# Patient Record
Sex: MALE | Race: WHITE | NOT HISPANIC OR LATINO | Employment: OTHER | URBAN - METROPOLITAN AREA
[De-identification: names, ages, dates, MRNs, and addresses within clinical notes are randomized per-mention and may not be internally consistent; named-entity substitution may affect disease eponyms.]

---

## 2017-01-18 ENCOUNTER — TELEPHONE (OUTPATIENT)
Dept: ORTHOPEDIC SURGERY | Facility: CLINIC | Age: 62
End: 2017-01-18

## 2017-01-18 NOTE — TELEPHONE ENCOUNTER
I spoke with patient and I have reviewed his case with other foot and ankle colleagues.  He like to have the most definitive treatment possible and I feel like he has been mother colleagues that the more predictable outcome would be with first MTP fusion and second metatarsal osteotomy or partial resection and work there.  Patient is in agreement with that.  When have him come in the office tomorrow let me take a look at everything again and hopefully get him taken care of next week

## 2017-01-19 ENCOUNTER — OFFICE VISIT (OUTPATIENT)
Dept: ORTHOPEDIC SURGERY | Facility: CLINIC | Age: 62
End: 2017-01-19

## 2017-01-19 VITALS — WEIGHT: 200 LBS | HEIGHT: 72 IN | BODY MASS INDEX: 27.09 KG/M2

## 2017-01-19 DIAGNOSIS — M20.11 HALLUX VALGUS, RIGHT: Primary | ICD-10-CM

## 2017-01-19 DIAGNOSIS — M77.41 METATARSALGIA OF RIGHT FOOT: ICD-10-CM

## 2017-01-19 DIAGNOSIS — M20.41 HAMMER TOE OF RIGHT FOOT: ICD-10-CM

## 2017-01-19 PROCEDURE — 99024 POSTOP FOLLOW-UP VISIT: CPT | Performed by: ORTHOPAEDIC SURGERY

## 2017-01-19 RX ORDER — SODIUM CHLORIDE 0.9 % (FLUSH) 0.9 %
1-10 SYRINGE (ML) INJECTION AS NEEDED
Status: CANCELLED | OUTPATIENT
Start: 2017-01-19

## 2017-01-19 RX ORDER — CEFAZOLIN SODIUM 2 G/100ML
2 INJECTION, SOLUTION INTRAVENOUS ONCE
Status: CANCELLED | OUTPATIENT
Start: 2017-01-19 | End: 2017-01-19

## 2017-01-19 NOTE — PROGRESS NOTES
"Foot Follow Up      Patient: Tesfaye Smith    YOB: 1955 61 y.o. male    Chief Complaints: Foot hurts    History of Present Illness: Patient is seen back today with a long history of persistent complaints of pain beneath the  Right second metatarsal head but no ulcers and associated cockup deformity of the second toe with recurrent significant hallux valgus deformity.  He now does get some occasional discomfort under the medial aspect of the great toe and tries to avoid weightbearing on that side.  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  Significant hallux valgus deformity of the right great toe is not quite passively correctable to neutral.  There is severe cockup deformity with clinical dislocation of the second MTP joint that is not reducible there is rigid hammering of the PIP joint second metatarsal head is prominent and there is some slight callus but no ulceration.  He is nontender many the third metatarsal head.  Grossly sensate to light touch throughout the foot.  His medial skin is now markedly improved there is no callus or fissuring.  He is nontender over the first TMT joint and there is no instability      Radiology: None performed      Assessment/Plan:  right recurrent hallux valgus deformity with deficient medial capsule and some mild arthritic pain to the first MTP joint.  right second MTP dislocation with metatarsalgia and hammering.  I've had a long discussion with him today and reviewed his case with other foot and ankle colleagues.  He would like to have the most definitive treatment possible so " as to avoid further surgery or potential other complications.  I told him that although no guarantee could be given regarding lack of complications that I feel like the most definitive procedure for him would be a first MTP arthrodesis this will get his first toe out of the way and relief arthritic pain possibly.  We'll also need to do extensor tendon lengthening and capsular release of the second MTP joint with possible partial metatarsal head resection or shortening osteotomy as well as PIP joint resection which I described to him in detail.  He voiced clear understanding of all this and understands and wishes to proceed.  Risks benefits potential outcomes and complications can include but are not limited to heart attack stroke death pneumonia infection bleeding damage to blood vessels and nerves or tendons blood clot pulmonary embolism persistent or worsening pain malunion or nonunion, transfer metatarsalgia and loss of the toes, and failure to return to presurgery and precondition levels of activity.  All of his questions answered to his full satisfaction will plan to proceed with this next week on an outpatient basis.

## 2017-01-19 NOTE — MR AVS SNAPSHOT
Tesfaye Smith   1/19/2017 3:15 PM   Office Visit    Dept Phone:  [phone]   Encounter #:  [id number]    Provider:  Jagdeep Medrano MD   Department:  [facility]                Your Full Care Plan              Your Updated Medication List          This list is accurate as of: 1/19/17  4:08 PM.  Always use your most recent med list.                ASPIR 81 MG EC tablet   Generic drug:  aspirin       CENTRUM SILVER ADULT 50+ tablet       lisinopril 10 MG tablet 20 mg, hydrochlorothiazide 25 MG tablet 25 mg       meloxicam 7.5 MG tablet   Commonly known as:  MOBIC       NEXIUM 40 MG capsule   Generic drug:  esomeprazole       ZYRTEC ALLERGY 10 MG capsule   Generic drug:  Cetirizine HCl               You Were Diagnosed With        Codes Comments    Hallux valgus, right    -  Primary ICD-10-CM: M20.11  ICD-9-CM: 735.0       Instructions     None    Patient Instructions History      Upcoming Appointments     Visit Type Date Time Department    FOLLOW UP 1/19/2017  3:15 PM [facility]    [facility] 2/1/2017  9:30 AM [facility]    OFFICE VISIT 2/8/2017 10:30 AM [facility]      [employer] Signup     Our records indicate that you have an active [employer] [facility] [employer] account.    You can view your After Visit Summary by going to [employer] and logging in with your CardSpring username and password.  If you don't have a CardSpring username and password but a parent or guardian has access to your record, the parent or guardian should login with their own CardSpring username and password and access your record to view the After Visit Summary.    If you have questions, you can email IS [email or web address] or call [phone] to talk to our CardSpring staff.  Remember, CardSpring is NOT to be used for urgent needs.  For medical emergencies, dial 911.               Other Info from Your Visit           Your Appointments     Feb  "01, 2017  9:30 AM EST   [facility] with [facility] MARCELLUSDESIREE AMAROU   [facility] (--)    [address] [id number] [phone]            Feb 08, 2017 10:30 AM EST   Office Visit with Adelaida Joseph MD   [facility] (--)    [address] [address] [phone]           Arrive 15 minutes prior to appointment.              Allergies     No Known Allergies      Reason for Visit     Right Foot - Follow-up, Pain           Vital Signs     Height Weight Body Mass Index Smoking Status          72\" (182.9 cm) 200 lb (90.7 kg) 27.12 kg/m2 Never Smoker        Problems and Diagnoses Noted     Bunion        "

## 2017-01-20 ENCOUNTER — APPOINTMENT (OUTPATIENT)
Dept: PREADMISSION TESTING | Facility: HOSPITAL | Age: 62
End: 2017-01-20

## 2017-01-20 VITALS
BODY MASS INDEX: 27.09 KG/M2 | DIASTOLIC BLOOD PRESSURE: 92 MMHG | HEIGHT: 72 IN | RESPIRATION RATE: 18 BRPM | TEMPERATURE: 98.3 F | WEIGHT: 200 LBS | SYSTOLIC BLOOD PRESSURE: 136 MMHG | OXYGEN SATURATION: 99 % | HEART RATE: 73 BPM

## 2017-01-20 LAB
ANION GAP SERPL CALCULATED.3IONS-SCNC: 15.3 MMOL/L
BUN BLD-MCNC: 15 MG/DL (ref 8–23)
BUN/CREAT SERPL: 13.4 (ref 7–25)
CALCIUM SPEC-SCNC: 9.5 MG/DL (ref 8.6–10.5)
CHLORIDE SERPL-SCNC: 100 MMOL/L (ref 98–107)
CO2 SERPL-SCNC: 26.7 MMOL/L (ref 22–29)
CREAT BLD-MCNC: 1.12 MG/DL (ref 0.76–1.27)
DEPRECATED RDW RBC AUTO: 47.4 FL (ref 37–54)
ERYTHROCYTE [DISTWIDTH] IN BLOOD BY AUTOMATED COUNT: 14 % (ref 11.5–14.5)
GFR SERPL CREATININE-BSD FRML MDRD: 66 ML/MIN/1.73
GLUCOSE BLD-MCNC: 99 MG/DL (ref 65–99)
HCT VFR BLD AUTO: 44.8 % (ref 40.4–52.2)
HGB BLD-MCNC: 15 G/DL (ref 13.7–17.6)
MCH RBC QN AUTO: 31.2 PG (ref 27–32.7)
MCHC RBC AUTO-ENTMCNC: 33.5 G/DL (ref 32.6–36.4)
MCV RBC AUTO: 93.1 FL (ref 79.8–96.2)
PLATELET # BLD AUTO: 193 10*3/MM3 (ref 140–500)
PMV BLD AUTO: 11.5 FL (ref 6–12)
POTASSIUM BLD-SCNC: 3.7 MMOL/L (ref 3.5–5.2)
RBC # BLD AUTO: 4.81 10*6/MM3 (ref 4.6–6)
SODIUM BLD-SCNC: 142 MMOL/L (ref 136–145)
WBC NRBC COR # BLD: 8.68 10*3/MM3 (ref 4.5–10.7)

## 2017-01-20 PROCEDURE — 85027 COMPLETE CBC AUTOMATED: CPT | Performed by: ORTHOPAEDIC SURGERY

## 2017-01-20 PROCEDURE — 80048 BASIC METABOLIC PNL TOTAL CA: CPT | Performed by: ORTHOPAEDIC SURGERY

## 2017-01-20 PROCEDURE — 36415 COLL VENOUS BLD VENIPUNCTURE: CPT

## 2017-01-20 NOTE — MR AVS SNAPSHOT
Tesfaye Smith   1/20/2017 3:00 PM   Appointment    Provider:  MIHAI FORTUNE 1   Department:  [facility]   Dept Phone:  [phone]                Your Full Care Plan           To Do List     2/1/2017 9:30 AM     Appointment with MARIA ELENA WALL at [facility] ([phone])   [address]  [facility] [address]       2/8/2017 10:30 AM     Appointment with Adelaida Joseph MD at [facility] ([phone])   Arrive 15 minutes prior to appointment.   [address]  [facility] [address]            Your Updated Medication List          This list is accurate as of: 1/20/17  3:37 PM.  Always use your most recent med list.                ASPIR 81 MG EC tablet   Generic drug:  aspirin       CENTRUM SILVER ADULT 50+ tablet       lisinopril 10 MG tablet 20 mg, hydrochlorothiazide 25 MG tablet 25 mg       meloxicam 7.5 MG tablet   Commonly known as:  MOBIC       NEXIUM 40 MG capsule   Generic drug:  esomeprazole       ZYRTEC ALLERGY 10 MG capsule   Generic drug:  Cetirizine HCl               [employer] Signup     Our records indicate that you have an active [employer] account.    You can view your After Visit Summary by going to [employer] and logging in with your [employer] username and password.  If you don't have a [employer] username and password but a parent or guardian has access to your record, the parent or guardian should login with their own [employer] username and password and access your record to view the After Visit Summary.    If you have questions, you can email [email or web address] or call [phone] to talk to our [employer] staff.  Remember, [employer] is NOT to be used for urgent needs.  For medical emergencies, dial 911.               Other Info from Your Visit           Allergies     No Known Allergies      Vital Signs     Smoking  Status                   Never Smoker             Discharge Instructions       Take the following medications the morning of surgery with a small sip of water    NEXIUM    ARRIVE AT 7AM        General Instructions:  • Do not eat or drink after midnight: includes water, mints, or gum. You may brush your teeth.  • Do not smoke, chew tobacco, or drink alcohol.  • Bring medications in original bottles, any inhalers and if applicable your C-PAP/ BI-PAP machine.  • Bring any papers given to you in the doctor’s office.  • Wear clean comfortable clothes and socks.  • Do not wear contact lenses or make-up.  Bring a case for your glasses if applicable.   • Bring crutches or walker if applicable.  • Leave all other valuables and jewelry at home.    If you were given a blood bank ID arm band remember to bring it with you the day of surgery.    Preventing a Surgical Site Infection:  Shower on the morning of surgery using a fresh bar of anti-bacterial soap (such as Dial) and clean washcloth.  Dry with a clean towel and dress in clean clothing.  For 2 to 3 days before surgery, avoid shaving with a razor near where you will have surgery because the razor can irritate skin and make it easier to develop an infection  Ask your surgeon if you will be receiving antibiotics prior to surgery  Make sure you, your family, and all healthcare providers clean their hands with soap and water or an alcohol based hand  before caring for you or your wound  If at all possible, quit smoking as many days before surgery as you can.    Day of surgery:  Upon arrival, a Pre-op nurse and Anesthesiologist will review your health history, obtain vital signs, and answer questions you may have.  The only belongings needed at this time will be your home medications and if applicable your C-PAP/BI-PAP machine.  If you are staying overnight your family can leave the rest of your belongings in the car and bring them to your room later.  A Pre-op nurse  will start an IV and you may receive medication in preparation for surgery, including something to help you relax.  Your family will be able to see you in the Pre-op area.  While you are in surgery your family should notify the waiting room  if they leave the waiting room area and provide a contact phone number.    Please be aware that surgery does come with discomfort.  We want to make every effort to control your discomfort so please discuss any uncontrolled symptoms with your nurse.   Your doctor will most likely have prescribed pain medications.      If you are going home after surgery you will receive individualized written care instructions before being discharged.  A responsible adult must drive you to and from the hospital on the day of your surgery and stay with you for 24 hours.    If you are staying overnight following surgery, you will be transported to your hospital room following the recovery period.  [facility] has all private rooms.    If you have any questions please call Pre-Admission Testing at [phone].  Deductibles and co-payments are collected on the day of service. Please be prepared to pay the required co-pay, deductible or deposit on the day of service as defined by your plan.       SYMPTOMS OF A STROKE    Call 911 or have someone take you to the Emergency Department if you have any of the following:    · Sudden numbness or weakness of your face, arm or leg especially on one side of the body  · Sudden confusion, diffiiculty speaking or trouble understanding   · Changes in your vision or loss of sight in one eye  · Sudden severe headache with no known cause  · sudden dizziness, trouble walking, loss of balance or coordination    It is important to seek emergency care right away if you have further stroke symptoms. If you get emergency help quickly, the powerful clot-dissolving medicines can reduce the disabilities caused by a stroke.     For more  information:    American Stroke Association  0-419-1-STROKE  www.strokeassociation.org           IF YOU SMOKE OR USE TOBACCO PLEASE READ THE FOLLOWING:    Why is smoking bad for me?  Smoking increases the risk of heart disease, lung disease, vascular disease, stroke, and cancer.     If you smoke, STOP!    If you would like more information on quitting smoking, please visit the [employer] website: [email or web address] First [email or web address]-[email or web address]   This link will provide additional resources including the QUIT line and the Beat the Pack support groups.     For more information:    American Cancer Society  (103) 380-6300    American Heart Association  1-318.505.4827

## 2017-01-20 NOTE — DISCHARGE INSTRUCTIONS
Take the following medications the morning of surgery with a small sip of water    NEXIUM    ARRIVE AT 7AM        General Instructions:  • Do not eat or drink after midnight: includes water, mints, or gum. You may brush your teeth.  • Do not smoke, chew tobacco, or drink alcohol.  • Bring medications in original bottles, any inhalers and if applicable your C-PAP/ BI-PAP machine.  • Bring any papers given to you in the doctor’s office.  • Wear clean comfortable clothes and socks.  • Do not wear contact lenses or make-up.  Bring a case for your glasses if applicable.   • Bring crutches or walker if applicable.  • Leave all other valuables and jewelry at home.    If you were given a blood bank ID arm band remember to bring it with you the day of surgery.    Preventing a Surgical Site Infection:  Shower on the morning of surgery using a fresh bar of anti-bacterial soap (such as Dial) and clean washcloth.  Dry with a clean towel and dress in clean clothing.  For 2 to 3 days before surgery, avoid shaving with a razor near where you will have surgery because the razor can irritate skin and make it easier to develop an infection  Ask your surgeon if you will be receiving antibiotics prior to surgery  Make sure you, your family, and all healthcare providers clean their hands with soap and water or an alcohol based hand  before caring for you or your wound  If at all possible, quit smoking as many days before surgery as you can.    Day of surgery:  Upon arrival, a Pre-op nurse and Anesthesiologist will review your health history, obtain vital signs, and answer questions you may have.  The only belongings needed at this time will be your home medications and if applicable your C-PAP/BI-PAP machine.  If you are staying overnight your family can leave the rest of your belongings in the car and bring them to your room later.  A Pre-op nurse will start an IV and you may receive medication in preparation for surgery,  including something to help you relax.  Your family will be able to see you in the Pre-op area.  While you are in surgery your family should notify the waiting room  if they leave the waiting room area and provide a contact phone number.    Please be aware that surgery does come with discomfort.  We want to make every effort to control your discomfort so please discuss any uncontrolled symptoms with your nurse.   Your doctor will most likely have prescribed pain medications.      If you are going home after surgery you will receive individualized written care instructions before being discharged.  A responsible adult must drive you to and from the hospital on the day of your surgery and stay with you for 24 hours.    If you are staying overnight following surgery, you will be transported to your hospital room following the recovery period.  [facility] has all private rooms.    If you have any questions please call Pre-Admission Testing at [phone].  Deductibles and co-payments are collected on the day of service. Please be prepared to pay the required co-pay, deductible or deposit on the day of service as defined by your plan.

## 2017-01-23 NOTE — H&P
"Patient: Tesfaye Smith     YOB: 1955 61 y.o. male     Chief Complaints: Foot hurts     History of Present Illness: Patient is seen back today with a long history of persistent complaints of pain beneath the right second metatarsal head but no ulcers and associated cockup deformity of the second toe with recurrent significant hallux valgus deformity. He now does get some occasional discomfort under the medial aspect of the great toe and tries to avoid weightbearing on that side.  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 Significant hallux valgus deformity of the right great toe is not quite passively correctable to neutral. There is severe cockup deformity with clinical dislocation of the second MTP joint that is not reducible there is rigid hammering of the PIP joint second metatarsal head is prominent and there is some slight callus but no ulceration. He is nontender many the third metatarsal head. Grossly sensate to light touch throughout the foot. His medial skin is now markedly improved there is no callus or fissuring. He is nontender over the first TMT joint and there is no instability        Radiology: None performed        Assessment/Plan: Right recurrent hallux valgus deformity with deficient medial capsule and some mild arthritic pain to the first MTP joint.right second MTP dislocation with metatarsalgia and hammering. I've had a long discussion with him today and reviewed his case with other foot and ankle colleagues. He would like to have the most " definitive treatment possible so as to avoid further surgery or potential other complications. I told him that although no guarantee could be given regarding lack of complications that I feel like the most definitive procedure for him would be a first MTP arthrodesis this will get his first toe out of the way and relief arthritic pain possibly. We'll also need to do extensor tendon lengthening and capsular release of the second MTP joint with possible partial metatarsal head resection or shortening osteotomy as well as PIP joint resection which I described to him in detail. He voiced clear understanding of all this and understands and wishes to proceed. Risks benefits potential outcomes and complications can include but are not limited to heart attack stroke death pneumonia infection bleeding damage to blood vessels and nerves or tendons blood clot pulmonary embolism persistent or worsening pain malunion or nonunion, transfer metatarsalgia and loss of the toes, and failure to return to presurgery and precondition levels of activity. All of his questions answered to his full satisfaction will plan to proceed with this next week on an outpatient basis.

## 2017-01-24 ENCOUNTER — APPOINTMENT (OUTPATIENT)
Dept: GENERAL RADIOLOGY | Facility: HOSPITAL | Age: 62
End: 2017-01-24

## 2017-01-24 ENCOUNTER — HOSPITAL ENCOUNTER (OUTPATIENT)
Facility: HOSPITAL | Age: 62
Setting detail: HOSPITAL OUTPATIENT SURGERY
Discharge: HOME | End: 2017-01-24
Attending: ORTHOPAEDIC SURGERY | Admitting: ORTHOPAEDIC SURGERY

## 2017-01-24 ENCOUNTER — ANESTHESIA (OUTPATIENT)
Dept: PERIOP | Facility: HOSPITAL | Age: 62
End: 2017-01-24

## 2017-01-24 ENCOUNTER — ANESTHESIA EVENT (OUTPATIENT)
Dept: PERIOP | Facility: HOSPITAL | Age: 62
End: 2017-01-24

## 2017-01-24 VITALS
TEMPERATURE: 97.6 F | WEIGHT: 197 LBS | DIASTOLIC BLOOD PRESSURE: 83 MMHG | SYSTOLIC BLOOD PRESSURE: 122 MMHG | HEART RATE: 86 BPM | BODY MASS INDEX: 26.72 KG/M2 | OXYGEN SATURATION: 96 % | RESPIRATION RATE: 16 BRPM

## 2017-01-24 DIAGNOSIS — M20.41 HAMMER TOE OF RIGHT FOOT: ICD-10-CM

## 2017-01-24 DIAGNOSIS — M77.41 METATARSALGIA OF RIGHT FOOT: ICD-10-CM

## 2017-01-24 DIAGNOSIS — M20.11 HALLUX VALGUS, RIGHT: ICD-10-CM

## 2017-01-24 PROCEDURE — C1713 ANCHOR/SCREW BN/BN,TIS/BN: HCPCS | Performed by: ORTHOPAEDIC SURGERY

## 2017-01-24 PROCEDURE — 25010000002 MIDAZOLAM PER 1 MG: Performed by: NURSE ANESTHETIST, CERTIFIED REGISTERED

## 2017-01-24 PROCEDURE — 76000 FLUOROSCOPY <1 HR PHYS/QHP: CPT

## 2017-01-24 PROCEDURE — 25010000002 ONDANSETRON PER 1 MG: Performed by: NURSE ANESTHETIST, CERTIFIED REGISTERED

## 2017-01-24 PROCEDURE — 28750 FUSION OF BIG TOE JOINT: CPT | Performed by: ORTHOPAEDIC SURGERY

## 2017-01-24 PROCEDURE — 25010000002 ROPIVACAINE PER 1 MG: Performed by: ANESTHESIOLOGY

## 2017-01-24 PROCEDURE — 20680 REMOVAL OF IMPLANT DEEP: CPT | Performed by: ORTHOPAEDIC SURGERY

## 2017-01-24 PROCEDURE — 25010000002 FENTANYL CITRATE (PF) 100 MCG/2ML SOLUTION: Performed by: ANESTHESIOLOGY

## 2017-01-24 PROCEDURE — 73620 X-RAY EXAM OF FOOT: CPT

## 2017-01-24 PROCEDURE — 25010000002 FENTANYL CITRATE (PF) 100 MCG/2ML SOLUTION: Performed by: NURSE ANESTHETIST, CERTIFIED REGISTERED

## 2017-01-24 PROCEDURE — 25010000002 MIDAZOLAM PER 1 MG: Performed by: ANESTHESIOLOGY

## 2017-01-24 PROCEDURE — 28285 REPAIR OF HAMMERTOE: CPT | Performed by: ORTHOPAEDIC SURGERY

## 2017-01-24 PROCEDURE — 28308 INCISION OF METATARSAL: CPT | Performed by: ORTHOPAEDIC SURGERY

## 2017-01-24 PROCEDURE — 25010000003 CEFAZOLIN IN DEXTROSE 2-4 GM/100ML-% SOLUTION: Performed by: ORTHOPAEDIC SURGERY

## 2017-01-24 PROCEDURE — 28645 REPAIR TOE DISLOCATION: CPT | Performed by: ORTHOPAEDIC SURGERY

## 2017-01-24 PROCEDURE — 25010000002 PROPOFOL 10 MG/ML EMULSION: Performed by: NURSE ANESTHETIST, CERTIFIED REGISTERED

## 2017-01-24 PROCEDURE — 25010000002 DEXAMETHASONE PER 1 MG: Performed by: NURSE ANESTHETIST, CERTIFIED REGISTERED

## 2017-01-24 PROCEDURE — 25010000002 PROPOFOL 1000 MG/ML EMULSION: Performed by: NURSE ANESTHETIST, CERTIFIED REGISTERED

## 2017-01-24 DEVICE — SCRW VA TI 3X16MM: Type: IMPLANTABLE DEVICE | Status: FUNCTIONAL

## 2017-01-24 DEVICE — SCRW VA TI 3X14MM: Type: IMPLANTABLE DEVICE | Status: FUNCTIONAL

## 2017-01-24 DEVICE — PLT MTP LP CONTRD STD RT: Type: IMPLANTABLE DEVICE | Status: FUNCTIONAL

## 2017-01-24 DEVICE — IMPLANTABLE DEVICE: Type: IMPLANTABLE DEVICE | Status: FUNCTIONAL

## 2017-01-24 DEVICE — SCRW QUICKFIX 2X12MM MAGENTA: Type: IMPLANTABLE DEVICE | Status: FUNCTIONAL

## 2017-01-24 RX ORDER — SODIUM CHLORIDE, SODIUM LACTATE, POTASSIUM CHLORIDE, CALCIUM CHLORIDE 600; 310; 30; 20 MG/100ML; MG/100ML; MG/100ML; MG/100ML
100 INJECTION, SOLUTION INTRAVENOUS CONTINUOUS
Status: DISCONTINUED | OUTPATIENT
Start: 2017-01-24 | End: 2017-01-24 | Stop reason: HOSPADM

## 2017-01-24 RX ORDER — PROMETHAZINE HYDROCHLORIDE 25 MG/ML
6.25 INJECTION, SOLUTION INTRAMUSCULAR; INTRAVENOUS ONCE AS NEEDED
Status: DISCONTINUED | OUTPATIENT
Start: 2017-01-24 | End: 2017-01-24 | Stop reason: HOSPADM

## 2017-01-24 RX ORDER — MAGNESIUM HYDROXIDE 1200 MG/15ML
LIQUID ORAL AS NEEDED
Status: DISCONTINUED | OUTPATIENT
Start: 2017-01-24 | End: 2017-01-24 | Stop reason: HOSPADM

## 2017-01-24 RX ORDER — FENTANYL CITRATE 50 UG/ML
50 INJECTION, SOLUTION INTRAMUSCULAR; INTRAVENOUS
Status: DISCONTINUED | OUTPATIENT
Start: 2017-01-24 | End: 2017-01-24 | Stop reason: HOSPADM

## 2017-01-24 RX ORDER — DIPHENHYDRAMINE HYDROCHLORIDE 50 MG/ML
12.5 INJECTION INTRAMUSCULAR; INTRAVENOUS
Status: DISCONTINUED | OUTPATIENT
Start: 2017-01-24 | End: 2017-01-24 | Stop reason: HOSPADM

## 2017-01-24 RX ORDER — ROPIVACAINE HYDROCHLORIDE 5 MG/ML
INJECTION, SOLUTION EPIDURAL; INFILTRATION; PERINEURAL AS NEEDED
Status: DISCONTINUED | OUTPATIENT
Start: 2017-01-24 | End: 2017-01-24 | Stop reason: SURG

## 2017-01-24 RX ORDER — LABETALOL HYDROCHLORIDE 5 MG/ML
5 INJECTION, SOLUTION INTRAVENOUS
Status: DISCONTINUED | OUTPATIENT
Start: 2017-01-24 | End: 2017-01-24 | Stop reason: HOSPADM

## 2017-01-24 RX ORDER — SODIUM CHLORIDE, SODIUM LACTATE, POTASSIUM CHLORIDE, CALCIUM CHLORIDE 600; 310; 30; 20 MG/100ML; MG/100ML; MG/100ML; MG/100ML
9 INJECTION, SOLUTION INTRAVENOUS CONTINUOUS
Status: DISCONTINUED | OUTPATIENT
Start: 2017-01-24 | End: 2017-01-24 | Stop reason: HOSPADM

## 2017-01-24 RX ORDER — DEXAMETHASONE SODIUM PHOSPHATE 10 MG/ML
INJECTION INTRAMUSCULAR; INTRAVENOUS AS NEEDED
Status: DISCONTINUED | OUTPATIENT
Start: 2017-01-24 | End: 2017-01-24 | Stop reason: SURG

## 2017-01-24 RX ORDER — OXYCODONE HYDROCHLORIDE AND ACETAMINOPHEN 5; 325 MG/1; MG/1
1-2 TABLET ORAL EVERY 4 HOURS PRN
Qty: 80 TABLET | Refills: 0 | Status: SHIPPED | OUTPATIENT
Start: 2017-01-24 | End: 2017-02-02 | Stop reason: SDUPTHER

## 2017-01-24 RX ORDER — SODIUM CHLORIDE 0.9 % (FLUSH) 0.9 %
1-10 SYRINGE (ML) INJECTION AS NEEDED
Status: DISCONTINUED | OUTPATIENT
Start: 2017-01-24 | End: 2017-01-24 | Stop reason: HOSPADM

## 2017-01-24 RX ORDER — CEFAZOLIN SODIUM 2 G/100ML
2 INJECTION, SOLUTION INTRAVENOUS ONCE
Status: COMPLETED | OUTPATIENT
Start: 2017-01-24 | End: 2017-01-24

## 2017-01-24 RX ORDER — IPRATROPIUM BROMIDE AND ALBUTEROL SULFATE 2.5; .5 MG/3ML; MG/3ML
3 SOLUTION RESPIRATORY (INHALATION) ONCE AS NEEDED
Status: DISCONTINUED | OUTPATIENT
Start: 2017-01-24 | End: 2017-01-24 | Stop reason: HOSPADM

## 2017-01-24 RX ORDER — MIDAZOLAM HYDROCHLORIDE 1 MG/ML
1 INJECTION INTRAMUSCULAR; INTRAVENOUS
Status: DISCONTINUED | OUTPATIENT
Start: 2017-01-24 | End: 2017-01-24 | Stop reason: HOSPADM

## 2017-01-24 RX ORDER — HYDROMORPHONE HYDROCHLORIDE 1 MG/ML
0.5 INJECTION, SOLUTION INTRAMUSCULAR; INTRAVENOUS; SUBCUTANEOUS
Status: DISCONTINUED | OUTPATIENT
Start: 2017-01-24 | End: 2017-01-24 | Stop reason: HOSPADM

## 2017-01-24 RX ORDER — ONDANSETRON 2 MG/ML
INJECTION INTRAMUSCULAR; INTRAVENOUS AS NEEDED
Status: DISCONTINUED | OUTPATIENT
Start: 2017-01-24 | End: 2017-01-24 | Stop reason: SURG

## 2017-01-24 RX ORDER — HYDRALAZINE HYDROCHLORIDE 20 MG/ML
5 INJECTION INTRAMUSCULAR; INTRAVENOUS
Status: DISCONTINUED | OUTPATIENT
Start: 2017-01-24 | End: 2017-01-24 | Stop reason: HOSPADM

## 2017-01-24 RX ORDER — PROMETHAZINE HYDROCHLORIDE 25 MG/1
25 SUPPOSITORY RECTAL ONCE AS NEEDED
Status: DISCONTINUED | OUTPATIENT
Start: 2017-01-24 | End: 2017-01-24 | Stop reason: HOSPADM

## 2017-01-24 RX ORDER — FAMOTIDINE 10 MG/ML
20 INJECTION, SOLUTION INTRAVENOUS ONCE
Status: COMPLETED | OUTPATIENT
Start: 2017-01-24 | End: 2017-01-24

## 2017-01-24 RX ORDER — LIDOCAINE HYDROCHLORIDE 20 MG/ML
INJECTION, SOLUTION INFILTRATION; PERINEURAL AS NEEDED
Status: DISCONTINUED | OUTPATIENT
Start: 2017-01-24 | End: 2017-01-24 | Stop reason: SURG

## 2017-01-24 RX ORDER — OXYCODONE HYDROCHLORIDE AND ACETAMINOPHEN 5; 325 MG/1; MG/1
2 TABLET ORAL ONCE
Status: COMPLETED | OUTPATIENT
Start: 2017-01-24 | End: 2017-01-24

## 2017-01-24 RX ORDER — PROPOFOL 10 MG/ML
VIAL (ML) INTRAVENOUS AS NEEDED
Status: DISCONTINUED | OUTPATIENT
Start: 2017-01-24 | End: 2017-01-24 | Stop reason: SURG

## 2017-01-24 RX ORDER — LABETALOL HYDROCHLORIDE 5 MG/ML
INJECTION, SOLUTION INTRAVENOUS AS NEEDED
Status: DISCONTINUED | OUTPATIENT
Start: 2017-01-24 | End: 2017-01-24 | Stop reason: SURG

## 2017-01-24 RX ORDER — PROMETHAZINE HYDROCHLORIDE 25 MG/1
25 TABLET ORAL ONCE AS NEEDED
Status: DISCONTINUED | OUTPATIENT
Start: 2017-01-24 | End: 2017-01-24 | Stop reason: HOSPADM

## 2017-01-24 RX ORDER — FENTANYL CITRATE 50 UG/ML
INJECTION, SOLUTION INTRAMUSCULAR; INTRAVENOUS AS NEEDED
Status: DISCONTINUED | OUTPATIENT
Start: 2017-01-24 | End: 2017-01-24 | Stop reason: SURG

## 2017-01-24 RX ORDER — MIDAZOLAM HYDROCHLORIDE 1 MG/ML
INJECTION INTRAMUSCULAR; INTRAVENOUS AS NEEDED
Status: DISCONTINUED | OUTPATIENT
Start: 2017-01-24 | End: 2017-01-24 | Stop reason: SURG

## 2017-01-24 RX ORDER — HYDROCODONE BITARTRATE AND ACETAMINOPHEN 5; 325 MG/1; MG/1
1 TABLET ORAL ONCE AS NEEDED
Status: DISCONTINUED | OUTPATIENT
Start: 2017-01-24 | End: 2017-01-24 | Stop reason: HOSPADM

## 2017-01-24 RX ORDER — ONDANSETRON 2 MG/ML
4 INJECTION INTRAMUSCULAR; INTRAVENOUS ONCE AS NEEDED
Status: DISCONTINUED | OUTPATIENT
Start: 2017-01-24 | End: 2017-01-24 | Stop reason: HOSPADM

## 2017-01-24 RX ORDER — OXYCODONE HYDROCHLORIDE AND ACETAMINOPHEN 5; 325 MG/1; MG/1
1 TABLET ORAL ONCE AS NEEDED
Status: DISCONTINUED | OUTPATIENT
Start: 2017-01-24 | End: 2017-01-24 | Stop reason: HOSPADM

## 2017-01-24 RX ORDER — MIDAZOLAM HYDROCHLORIDE 1 MG/ML
2 INJECTION INTRAMUSCULAR; INTRAVENOUS
Status: DISCONTINUED | OUTPATIENT
Start: 2017-01-24 | End: 2017-01-24 | Stop reason: HOSPADM

## 2017-01-24 RX ORDER — CEPHALEXIN 500 MG/1
500 CAPSULE ORAL EVERY 6 HOURS
Qty: 8 CAPSULE | Refills: 0 | Status: SHIPPED | OUTPATIENT
Start: 2017-01-24 | End: 2017-01-26

## 2017-01-24 RX ADMIN — FENTANYL CITRATE 50 MCG: 50 INJECTION INTRAMUSCULAR; INTRAVENOUS at 15:02

## 2017-01-24 RX ADMIN — SODIUM CHLORIDE, POTASSIUM CHLORIDE, SODIUM LACTATE AND CALCIUM CHLORIDE 9 ML/HR: 600; 310; 30; 20 INJECTION, SOLUTION INTRAVENOUS at 08:39

## 2017-01-24 RX ADMIN — MIDAZOLAM HYDROCHLORIDE 0.5 MG: 1 INJECTION, SOLUTION INTRAMUSCULAR; INTRAVENOUS at 12:25

## 2017-01-24 RX ADMIN — OXYCODONE HYDROCHLORIDE AND ACETAMINOPHEN 2 TABLET: 5; 325 TABLET ORAL at 15:05

## 2017-01-24 RX ADMIN — FENTANYL CITRATE 25 MCG: 50 INJECTION INTRAMUSCULAR; INTRAVENOUS at 12:25

## 2017-01-24 RX ADMIN — FENTANYL CITRATE 25 MCG: 50 INJECTION INTRAMUSCULAR; INTRAVENOUS at 13:18

## 2017-01-24 RX ADMIN — ROPIVACAINE HYDROCHLORIDE 25 ML: 5 INJECTION, SOLUTION EPIDURAL; INFILTRATION; PERINEURAL at 09:02

## 2017-01-24 RX ADMIN — FENTANYL CITRATE 50 MCG: 50 INJECTION INTRAMUSCULAR; INTRAVENOUS at 15:14

## 2017-01-24 RX ADMIN — FENTANYL CITRATE 50 MCG: 50 INJECTION INTRAMUSCULAR; INTRAVENOUS at 09:01

## 2017-01-24 RX ADMIN — MIDAZOLAM HYDROCHLORIDE 1 MG: 1 INJECTION, SOLUTION INTRAMUSCULAR; INTRAVENOUS at 11:16

## 2017-01-24 RX ADMIN — FAMOTIDINE 20 MG: 10 INJECTION, SOLUTION INTRAVENOUS at 08:39

## 2017-01-24 RX ADMIN — DEXAMETHASONE SODIUM PHOSPHATE 8 MG: 10 INJECTION INTRAMUSCULAR; INTRAVENOUS at 11:30

## 2017-01-24 RX ADMIN — SODIUM CHLORIDE, POTASSIUM CHLORIDE, SODIUM LACTATE AND CALCIUM CHLORIDE: 600; 310; 30; 20 INJECTION, SOLUTION INTRAVENOUS at 11:09

## 2017-01-24 RX ADMIN — ONDANSETRON 4 MG: 2 INJECTION INTRAMUSCULAR; INTRAVENOUS at 13:45

## 2017-01-24 RX ADMIN — MIDAZOLAM 2 MG: 1 INJECTION INTRAMUSCULAR; INTRAVENOUS at 09:01

## 2017-01-24 RX ADMIN — LABETALOL HYDROCHLORIDE 5 MG: 5 INJECTION, SOLUTION INTRAVENOUS at 12:40

## 2017-01-24 RX ADMIN — PROPOFOL 150 MCG/KG/MIN: 10 INJECTION, EMULSION INTRAVENOUS at 11:16

## 2017-01-24 RX ADMIN — FENTANYL CITRATE 50 MCG: 50 INJECTION INTRAMUSCULAR; INTRAVENOUS at 11:14

## 2017-01-24 RX ADMIN — CEFAZOLIN SODIUM 2 G: 2 INJECTION, SOLUTION INTRAVENOUS at 11:08

## 2017-01-24 RX ADMIN — PROPOFOL 200 MG: 10 INJECTION, EMULSION INTRAVENOUS at 11:14

## 2017-01-24 RX ADMIN — LIDOCAINE HYDROCHLORIDE 60 MG: 20 INJECTION, SOLUTION INFILTRATION; PERINEURAL at 11:14

## 2017-01-24 RX ADMIN — MIDAZOLAM HYDROCHLORIDE 0.5 MG: 1 INJECTION, SOLUTION INTRAMUSCULAR; INTRAVENOUS at 13:18

## 2017-01-24 NOTE — IP AVS SNAPSHOT
AFTER VISIT SUMMARY             Tesfaye Smith           About your hospitalization     You were admitted on:  January 24, 2017 You last received care in the:  [facility] OR       Procedures & Surgeries      Procedure(s) (LRB):  ARTHRODESIS first METATARSALPHALANGEAL JOINT with second metatarsophalageal joint release and second metatarsal osteotomy or resection and second hammertoe correction with proximal phalangeal joint resection of right foot (Right)     1/24/2017     Surgeon(s):  Jagdeep Medrano MD  -------------------      Medications    If you or your caregiver advised us that you are currently taking a medication and that medication is marked below as “Resume”, this simply indicates that we have reviewed those medications to make sure our new therapy recommendations do not interfere.  If you have concerns about medications other than those new ones which we are prescribing today, please consult the physician who prescribed them (or your primary physician).  Our review of your home medications is not meant to indicate that we are directing their use.             Your Medications      START taking these medications     cephalexin 500 MG capsule   Take 1 capsule by mouth Every 6 (Six) Hours for 8 doses. Begin day of surgery   Commonly known as:  KEFLEX           oxyCODONE-acetaminophen 5-325 MG per tablet   Take 1-2 tablets by mouth Every 4 (Four) Hours As Needed (Pain).   Last time this was given:  1/24/2017  3:05 PM   Commonly known as:  PERCOCET             CONTINUE taking these medications     ASPIR 81 MG EC tablet   Take 81 mg by mouth daily.   Generic drug:  aspirin           CENTRUM SILVER ADULT 50+ tablet   Take 1 tablet by mouth Daily.           lisinopril 10 MG tablet 20 mg, hydrochlorothiazide 25 MG tablet 25 mg   Take 1 dose by mouth Daily.           NEXIUM 40 MG capsule   Take 40 mg by mouth every morning before breakfast.   Generic drug:  esomeprazole           ZYRTEC ALLERGY 10 MG capsule   Take 1 tablet by mouth As Needed.   Generic drug:  Cetirizine HCl             STOP taking these medications     meloxicam 7.5 MG tablet   Commonly known as:  MOBIC                Where to Get Your Medications      These medications were sent to [facility] - [place], [place] - [address] [facility] - [phone]  - [phone] FX  [address] [facility] [address]     Phone:  [phone]     cephalexin 500 MG capsule         You can get these medications from any pharmacy     Bring a paper prescription for each of these medications     oxyCODONE-acetaminophen 5-325 MG per tablet                  Your Medications      Your Medication List           Morning Noon Evening Bedtime As Needed    ASPIR 81 MG EC tablet   Take 81 mg by mouth daily.   Generic drug:  aspirin                                CENTRUM SILVER ADULT 50+ tablet   Take 1 tablet by mouth Daily.                                cephalexin 500 MG capsule   Take 1 capsule by mouth Every 6 (Six) Hours for 8 doses. Begin day of surgery   Commonly known as:  KEFLEX                                lisinopril 10 MG tablet 20 mg, hydrochlorothiazide 25 MG tablet 25 mg   Take 1 dose by mouth Daily.                                NEXIUM 40 MG capsule   Take 40 mg by mouth every morning before breakfast.   Generic drug:  esomeprazole                                oxyCODONE-acetaminophen 5-325 MG per tablet   Take 1-2 tablets by mouth Every 4 (Four) Hours As Needed (Pain).   Commonly known as:  PERCOCET                                ZYRTEC ALLERGY 10 MG capsule   Take 1 tablet by mouth As Needed.   Generic drug:  Cetirizine HCl                                         Instructions for After Discharge        Activity Instructions     Discharge activity       Strict NWB, ice, and elevate, keep dressing dry, do not remove       Other restrictions (specify):       Post Operative Foot/Ankle Surgery  Orders/Instructions:         I. ACTIVITIES:  1. Exercises/Activities of Daily Living:  Strict NON-WEIGHTBEARING status.  Use crutches or walker for mobilization  Ice and elevate the operative extremity above level of heart  No tub baths, hot tubs, or swimming pools for 4 weeks  Your surgeon will discuss with you when you will be able to drive again.  Everyone that comes near you should wash their hands  Avoid sick people.     IV. INCISION CARE:  Do NOT remove the dressing  Keep dressing DRY  No creams or ointments to the incision  Check dressing every day and notify surgeon immediately:  If any significant drainage   Increase in swelling of the extremity  Increase in pain  Increase in overall body temperature (greater than 100.5 degrees)    V. Medications:   1. Stool Softeners: You will be at greater risk of constipation after surgery due to being less mobile and the pain medications.   Take stool softeners as instructed by your surgeon while on pain medications. Over the counter Colace 100 mg 1-2 capsules twice daily.   If stools become too loose or too frequent, please decreases the dosage or stop the stool softener.  If constipation occurs despite use of stool softeners, you are to continue the stool softeners and add a laxative (Milk of Magnesia 1 ounce daily as needed)  Drink plenty of fluids, and eat fruits and vegetables during your recovery time      2. Antibiotics: Post-operative antibiotics are to begin today    3. Pain Medications utilized after surgery are narcotics and the law requires that the following information be given to all patients that are prescribed narcotics:  CLASSIFICATION: Pain medications are called Opioids and are narcotics  LEGALITIES: It is illegal to share narcotics with others and to drive within 24 hours of taking narcotics  POTENTIAL SIDE EFFECTS: Potential side effects of opioids include: nausea, vomiting, itching, dizziness, drowsiness, dry mouth, constipation, and difficulty  urinating.  POTENTIAL ADVERSE EFFECTS:   Opioid tolerance can develop with use of pain medications and this simply means that it requires more and more of the medication to control pain; however, this is seen more in patients that use opioids for longer periods of time.  Opioid dependence can develop with use of Opioids and this simply means that to stop the medication can cause withdrawal symptoms; however, this is seen with patients that use Opioids for longer periods of time.  Opioid addiction can develop with use of Opioids and the incidence of this is very unlikely in patients who take the medications as ordered and stop the medications as instructed.  Opioid overdose can be dangerous, but is unlikely when the medication is taken as ordered and stopped when ordered. It is important not to mix opioids with alcohol or with and type of sedative such as Benadryl as this can lead to over sedation and respiratory difficulty.  DOSAGE:   Pain medications will need to be taken consistently for the first week to decrease pain and promote adequate pain relief and participation in physical therapy.  After the initial surgical pain begins to resolve, you may begin to decrease the pain medication. By the end of 6 weeks, you should be off of pain medications.  Refills will not be given by the office during evening hours, on weekends, or after 6 weeks post-op.  To seek refills on pain medications during the initial 6 week post-operative period, you must call the office 48 hours in advance to request the refill. The office will then notify you when to  the prescription. DO NOT wait until you are out of the medication to request a refill.      V. FOLLOW-UP VISITS:  You will need to follow up in the office with your surgeon in 7-10 days. Please call this number [phone]  to schedule this appointment.  If you have any concerns or suspected complications prior to your follow up visit, please call your surgeons office.  Do not wait until your appointment time if you suspect complications. These will need to be addressed in the office promptly.             Other Instructions     Leave dressing on - Keep it clean, dry, and intact until clinic visit                   Discharge Instructions           What to expect after a Nerve Block    Nerve blocks administered to block pain affect many types of nerves, including those nerves that control movement, pain, and normal sensation. Following a nerve block, you may notice some bruising at the site where the block was given. You may experience sensations such as: numbness of the affected area or limb, tingling, heaviness (that is the limb feels heavy to you), weakness or inability to move the affected arm or leg, or a feeling as if your arm or leg has “fallen asleep.”     A nerve block can last from 2 to 36 hours depending on the medications used.  Usually the weakness wears off first followed by the tingling and heaviness. As the block wears off, you may begin to notice pain; however, this sequence of events may occur in any order. Typically, you will be able to move your limb before you will feel it. Once a nerve block begins to wear off, the effects are usually completely gone within 60 minutes.  If you experience continued side effects that you believe are block related for longer than 48 hours, please call your healthcare provider. Please see block-specific instructions below.    Instructions for any Block involving the leg/foot:   If you have had a leg /foot block, you should not bear weight on the affected leg until the block has worn off. After the block has worn off, weight bearing should be as directed by your surgeon. You may be sent home with crutches. You are at high risk for falling because of the anesthetic effects on your leg. Please use caution when standing or trying to move or walk. Have someone assist you until your leg and foot function have returned to normal.      Protection of a “blocked” limb  • After a nerve block, you cannot feel pain, pressure, or extremes of temperature in the affected limb. And because of this, your blocked limb is at more risk for injury. For example, it is possible to burn your limb on an extremely hot surface without feeling it.     • When resting, it is important to reposition your limb periodically to avoid prolonged pressure on it. This may require the use of pillows and padding.    • While sleeping, you should avoid rolling onto the affected limb or putting too much pressure on it.     • If you have a cast or tight dressing, check the color of your fingers or toes of the affected limb. Call your surgeon if they look discolored (that is, dusky, dark colored).    • Use caution in cold weather. Cover your limb appropriately to protect it from the cold.    Pain Management:  Your surgeon will give you a prescription for pain medication. Begin taking this before the nerve block wears off. Bear in mind that sometimes the block can wear off in the middle of the night.         Discharge References/Attachments     GENERAL ANESTHESIA, ADULT, CARE AFTER (ENGLISH)    ACETAMINOPHEN; OXYCODONE TABLETS (ENGLISH)       Follow-ups for After Discharge        Follow-up Information     Follow up with Adelaida Joseph MD .    Specialty:  Internal Medicine    Contact information:    [address] [place] [id number] [phone]        Referrals and Follow-ups to Schedule     Return to clinic for follow-up    As directed    Follow Up Details:  7-10 days             Scheduled Appointments     Feb 01, 2017  9:30 AM EST   [facility] with [facility]   [facility] INTERNAL MEDICINE (--)    [address] [address] [phone]            Feb 08, 2017 10:30 AM EST   Office Visit with Adelaida Joseph MD   [facility] INTERNAL MEDICINE (--)    [address] [address]    [phone]           Arrive 15 minutes prior to appointment.              Plan[employer] Signup     Our records indicate that you have an active Gem[employer] account.    You can view your After Visit Summary by going to [email or web address] and logging in with your crossvertise username and password.  If you don't have a crossvertise username and password but a parent or guardian has access to your record, the parent or guardian should login with their own crossvertise username and password and access your record to view the After Visit Summary.    If you have questions, you can email [email or web address] or call [phone] to talk to our crossvertise staff.  Remember, crossvertise is NOT to be used for urgent needs.  For medical emergencies, dial 911.           Summary of Your Hospitalization        Reason for Hospitalization     Your primary diagnosis was:  Not on File      Care Providers     Provider Service Role Specialty    Jagdeep Medrano MD -- Attending Provider Orthopedic Surgery    Jagdeep Medrano MD -- Surgeon  Orthopedic Surgery      Your Allergies  Date Reviewed: 1/24/2017    No active allergies      Patient Belongings Returned     Document Return of Belongings Flowsheet     Were the patient bedside belongings sent home?   --   Belongings Retrieved from Security & Sent Home   --    Belongings Sent to Safe   --   Medications Retrieved from Pharmacy & Sent Home   --              More Information      General Anesthesia, Adult, Care After  Refer to this sheet in the next few weeks. These instructions provide you with information on caring for yourself after your procedure. Your health care provider may also give you more specific instructions. Your treatment has been planned according to current medical practices, but problems sometimes occur. Call your health care provider if you have any problems or questions after your procedure.  WHAT TO EXPECT AFTER THE PROCEDURE  After the procedure, it  is typical to experience:  · Sleepiness.  · Nausea and vomiting.  HOME CARE INSTRUCTIONS  · For the first 24 hours after general anesthesia:  ¨ Have a responsible person with you.  ¨ Do not drive a car. If you are alone, do not take public transportation.  ¨ Do not drink alcohol.  ¨ Do not take medicine that has not been prescribed by your health care provider.  ¨ Do not sign important papers or make important decisions.  ¨ You may resume a normal diet and activities as directed by your health care provider.  · Change bandages (dressings) as directed.  · If you have questions or problems that seem related to general anesthesia, call the hospital and ask for the anesthetist or anesthesiologist on call.  SEEK MEDICAL CARE IF:  · You have nausea and vomiting that continue the day after anesthesia.  · You develop a rash.  SEEK IMMEDIATE MEDICAL CARE IF:   · You have difficulty breathing.  · You have chest pain.  · You have any allergic problems.     This information is not intended to replace advice given to you by your health care provider. Make sure you discuss any questions you have with your health care provider.     Document Released: 03/26/2002 Document Revised: 01/08/2016 Document Reviewed: 04/17/2013  [employer] Interactive Patient Education ©2016 Elsevier Inc.          Acetaminophen; Oxycodone tablets  What is this medicine?  ACETAMINOPHEN; OXYCODONE (a set a ASHLEY perla fen; ox i KOE done) is a pain reliever. It is used to treat moderate to severe pain.  This medicine may be used for other purposes; ask your health care provider or pharmacist if you have questions.  What should I tell my health care provider before I take this medicine?  They need to know if you have any of these conditions:  -brain tumor  -Crohn's disease, inflammatory bowel disease, or ulcerative colitis  -drug abuse or addiction  -head injury  -heart or circulation problems  -if you often drink alcohol  -kidney disease or problems going to the  bathroom  -liver disease  -lung disease, asthma, or breathing problems  -an unusual or allergic reaction to acetaminophen, oxycodone, other opioid analgesics, other medicines, foods, dyes, or preservatives  -pregnant or trying to get pregnant  -breast-feeding  How should I use this medicine?  Take this medicine by mouth with a full glass of water. Follow the directions on the prescription label. You can take it with or without food. If it upsets your stomach, take it with food. Take your medicine at regular intervals. Do not take it more often than directed.  Talk to your pediatrician regarding the use of this medicine in children. Special care may be needed.  Patients over 65 years old may have a stronger reaction and need a smaller dose.  Overdosage: If you think you have taken too much of this medicine contact a poison control center or emergency room at once.  NOTE: This medicine is only for you. Do not share this medicine with others.  What if I miss a dose?  If you miss a dose, take it as soon as you can. If it is almost time for your next dose, take only that dose. Do not take double or extra doses.  What may interact with this medicine?  -alcohol  -antihistamines  -barbiturates like amobarbital, butalbital, butabarbital, methohexital, pentobarbital, phenobarbital, thiopental, and secobarbital  -benztropine  -drugs for bladder problems like solifenacin, trospium, oxybutynin, tolterodine, hyoscyamine, and methscopolamine  -drugs for breathing problems like ipratropium and tiotropium  -drugs for certain stomach or intestine problems like propantheline, homatropine methylbromide, glycopyrrolate, atropine, belladonna, and dicyclomine  -general anesthetics like etomidate, ketamine, nitrous oxide, propofol, desflurane, enflurane, halothane, isoflurane, and sevoflurane  -medicines for depression, anxiety, or psychotic disturbances  -medicines for sleep  -muscle relaxants  -naltrexone  -narcotic medicines (opiates)  for pain  -phenothiazines like perphenazine, thioridazine, chlorpromazine, mesoridazine, fluphenazine, prochlorperazine, promazine, and trifluoperazine  -scopolamine  -tramadol  -trihexyphenidyl  This list may not describe all possible interactions. Give your health care provider a list of all the medicines, herbs, non-prescription drugs, or dietary supplements you use. Also tell them if you smoke, drink alcohol, or use illegal drugs. Some items may interact with your medicine.  What should I watch for while using this medicine?  Tell your doctor or health care professional if your pain does not go away, if it gets worse, or if you have new or a different type of pain. You may develop tolerance to the medicine. Tolerance means that you will need a higher dose of the medication for pain relief. Tolerance is normal and is expected if you take this medicine for a long time.  Do not suddenly stop taking your medicine because you may develop a severe reaction. Your body becomes used to the medicine. This does NOT mean you are addicted. Addiction is a behavior related to getting and using a drug for a non-medical reason. If you have pain, you have a medical reason to take pain medicine. Your doctor will tell you how much medicine to take. If your doctor wants you to stop the medicine, the dose will be slowly lowered over time to avoid any side effects.  You may get drowsy or dizzy. Do not drive, use machinery, or do anything that needs mental alertness until you know how this medicine affects you. Do not stand or sit up quickly, especially if you are an older patient. This reduces the risk of dizzy or fainting spells. Alcohol may interfere with the effect of this medicine. Avoid alcoholic drinks.  There are different types of narcotic medicines (opiates) for pain. If you take more than one type at the same time, you may have more side effects. Give your health care provider a list of all medicines you use. Your doctor  will tell you how much medicine to take. Do not take more medicine than directed. Call emergency for help if you have problems breathing.  The medicine will cause constipation. Try to have a bowel movement at least every 2 to 3 days. If you do not have a bowel movement for 3 days, call your doctor or health care professional.  Do not take Tylenol (acetaminophen) or medicines that have acetaminophen with this medicine. Too much acetaminophen can be very dangerous. Many nonprescription medicines contain acetaminophen. Always read the labels carefully to avoid taking more acetaminophen.  What side effects may I notice from receiving this medicine?  Side effects that you should report to your doctor or health care professional as soon as possible:  -allergic reactions like skin rash, itching or hives, swelling of the face, lips, or tongue  -breathing difficulties, wheezing  -confusion  -light headedness or fainting spells  -severe stomach pain  -unusually weak or tired  -yellowing of the skin or the whites of the eyes  Side effects that usually do not require medical attention (report to your doctor or health care professional if they continue or are bothersome):  -dizziness  -drowsiness  -nausea  -vomiting  This list may not describe all possible side effects. Call your doctor for medical advice about side effects. You may report side effects to FDA at 7-508-FDA-0139.  Where should I keep my medicine?  Keep out of the reach of children. This medicine can be abused. Keep your medicine in a safe place to protect it from theft. Do not share this medicine with anyone. Selling or giving away this medicine is dangerous and against the law.  This medicine may cause accidental overdose and death if it taken by other adults, children, or pets. Mix any unused medicine with a substance like cat litter or coffee grounds. Then throw the medicine away in a sealed container like a sealed bag or a coffee can with a lid. Do not use the  medicine after the expiration date.  Store at room temperature between 20 and 25 degrees C (68 and 77 degrees F).  NOTE: This sheet is a summary. It may not cover all possible information. If you have questions about this medicine, talk to your doctor, pharmacist, or health care provider.     © 2016, Elsevier/Gold Standard. (2015-11-18 15:18:46)         PREVENTING SURGICAL SITE INFECTIONS     Surgical Site Infections FAQs  What is a Surgical Site Infection (SSI)?  A surgical site infection is an infection that occurs after surgery in the part of the body where the surgery took place. Most patients who have surgery do not develop an infection. However, infections develop in about 1 to 3 out of every 100 patients who have surgery.  Some of the common symptoms of a surgical site infection are:  · Redness and pain around the area where you had surgery  · Drainage of cloudy fluid from your surgical wound  · Fever  Can SSIs be treated?  Yes. Most surgical site infections can be treated with antibiotics. The antibiotic given to you depends on the bacteria (germs) causing the infection. Sometimes patients with SSIs also need another surgery to treat the infection.  What are some of the things that hospitals are doing to prevent SSIs?  To prevent SSIs, doctors, nurses, and other healthcare providers:  · Clean their hands and arms up to their elbows with an antiseptic agent just before the surgery.  · Clean their hands with soap and water or an alcohol-based hand rub before and after caring for each patient.  · May remove some of your hair immediately before your surgery using electric clippers if the hair is in the same area where the procedure will occur. They should not shave you with a razor.  · Wear special hair covers, masks, gowns, and gloves during surgery to keep the surgery area clean.  · Give you antibiotics before your surgery starts. In most cases, you should get antibiotics within 60 minutes before the surgery  starts and the antibiotics should be stopped within 24 hours after surgery.  · Clean the skin at the site of your surgery with a special soap that kills germs.  What can I do to help prevent SSIs?  Before your surgery:  · Tell your doctor about other medical problems you may have. Health problems such as allergies, diabetes, and obesity could affect your surgery and your treatment.  · Quit smoking. Patients who smoke get more infections. Talk to your doctor about how you can quit before your surgery.  · Do not shave near where you will have surgery. Shaving with a razor can irritate your skin and make it easier to develop an infection.  At the time of your surgery:  · Speak up if someone tries to shave you with a razor before surgery. Ask why you need to be shaved and talk with your surgeon if you have any concerns.  · Ask if you will get antibiotics before surgery.  After your surgery:  · Make sure that your healthcare providers clean their hands before examining you, either with soap and water or an alcohol-based hand rub.    If you do not see your providers clean their hands, please ask them to do so.  · Family and friends who visit you should not touch the surgical wound or dressings.  · Family and friends should clean their hands with soap and water or an alcohol-based hand rub before and after visiting you. If you do not see them clean their hands, ask them to clean their hands.  What do I need to do when I go home from the hospital?  · Before you go home, your doctor or nurse should explain everything you need to know about taking care of your wound. Make sure you understand how to care for your wound before you leave the hospital.  · Always clean your hands before and after caring for your wound.  · Before you go home, make sure you know who to contact if you have questions or problems after you get home.  · If you have any symptoms of an infection, such as redness and pain at the surgery site, drainage, or  fever, call your doctor immediately.  If you have additional questions, please ask your doctor or nurse.  Developed and co-sponsored by The Society for Healthcare Epidemiology of [place] (SHEA); Infectious Diseases Society of [place] (IDSA); American Hospital Association; Association for Professionals in Infection Control and Epidemiology (APIC); Centers for Disease Control and Prevention (CDC); and The Joint Commission.     This information is not intended to replace advice given to you by your health care provider. Make sure you discuss any questions you have with your health care provider.     Document Released: 12/23/2014 Document Revised: 01/08/2016 Document Reviewed: 03/02/2016  [employer] Interactive Patient Education ©2016 [employer] Inc.             SYMPTOMS OF A STROKE    Call 911 or have someone take you to the Emergency Department if you have any of the following:    · Sudden numbness or weakness of your face, arm or leg especially on one side of the body  · Sudden confusion, diffiiculty speaking or trouble understanding   · Changes in your vision or loss of sight in one eye  · Sudden severe headache with no known cause  · sudden dizziness, trouble walking, loss of balance or coordination    It is important to seek emergency care right away if you have further stroke symptoms. If you get emergency help quickly, the powerful clot-dissolving medicines can reduce the disabilities caused by a stroke.     For more information:    American Stroke Association  8-560-0-STROKE  www.strokeassociation.org           IF YOU SMOKE OR USE TOBACCO PLEASE READ THE FOLLOWING:    Why is smoking bad for me?  Smoking increases the risk of heart disease, lung disease, vascular disease, stroke, and cancer.     If you smoke, STOP!    If you would like more information on quitting smoking, please visit the [employer] website: [email or web address]   This link will provide additional  resources including the QUIT line and the Beat the Pack support groups.     For more information:    American Cancer Society  ([phone] 411-6974    American Heart Association  1-672.300.9721               YOU ARE THE MOST IMPORTANT FACTOR IN YOUR RECOVERY.     Follow all instructions carefully.     I have reviewed my discharge instructions with my nurse, including the following information, if applicable:     Information about my illness and diagnosis   Follow up appointments (including lab draws)   Wound Care   Equipment Needs   Medications (new and continuing) along with side effects   Preventative information such as vaccines and smoking cessations   Diet   Pain   I know when to contact my Doctor's office or seek emergency care      I want my nurse to describe the side effects of my medications: YES NO   If the answer is no, I understand the side effects of my medications: YES NO   My nurse described the side effects of my medications in a way that I could understand: YES NO   I have taken my personal belongings and my own medications with me at discharge: YES NO            I have received this information and my questions have been answered. I have discussed any concerns I see with this plan with the nurse or physician. I understand these instructions.    Signature of Patient or Responsible Person: _____________________________________    Date: _________________  Time: __________________    Signature of Healthcare Provider: _______________________________________  Date: _________________  Time: __________________

## 2017-01-24 NOTE — DISCHARGE INSTRUCTIONS
What to expect after a Nerve Block    Nerve blocks administered to block pain affect many types of nerves, including those nerves that control movement, pain, and normal sensation. Following a nerve block, you may notice some bruising at the site where the block was given. You may experience sensations such as: numbness of the affected area or limb, tingling, heaviness (that is the limb feels heavy to you), weakness or inability to move the affected arm or leg, or a feeling as if your arm or leg has “fallen asleep.”     A nerve block can last from 2 to 36 hours depending on the medications used.  Usually the weakness wears off first followed by the tingling and heaviness. As the block wears off, you may begin to notice pain; however, this sequence of events may occur in any order. Typically, you will be able to move your limb before you will feel it. Once a nerve block begins to wear off, the effects are usually completely gone within 60 minutes.  If you experience continued side effects that you believe are block related for longer than 48 hours, please call your healthcare provider. Please see block-specific instructions below.    Instructions for any Block involving the leg/foot:   If you have had a leg /foot block, you should not bear weight on the affected leg until the block has worn off. After the block has worn off, weight bearing should be as directed by your surgeon. You may be sent home with crutches. You are at high risk for falling because of the anesthetic effects on your leg. Please use caution when standing or trying to move or walk. Have someone assist you until your leg and foot function have returned to normal.     Protection of a “blocked” limb  • After a nerve block, you cannot feel pain, pressure, or extremes of temperature in the affected limb. And because of this, your blocked limb is at more risk for injury. For example, it is possible to burn your limb on an extremely hot surface  without feeling it.     • When resting, it is important to reposition your limb periodically to avoid prolonged pressure on it. This may require the use of pillows and padding.    • While sleeping, you should avoid rolling onto the affected limb or putting too much pressure on it.     • If you have a cast or tight dressing, check the color of your fingers or toes of the affected limb. Call your surgeon if they look discolored (that is, dusky, dark colored).    • Use caution in cold weather. Cover your limb appropriately to protect it from the cold.    Pain Management:  Your surgeon will give you a prescription for pain medication. Begin taking this before the nerve block wears off. Bear in mind that sometimes the block can wear off in the middle of the night.

## 2017-01-24 NOTE — ANESTHESIA POSTPROCEDURE EVALUATION
Patient: Tesfaye Smith    Procedure Summary     Date Anesthesia Start Anesthesia Stop Room / Location    01/24/17 1108 [id number]  [facility]  /  [facility]       Procedure Diagnosis Surgeon Provider    ARTHRODESIS first METATARSALPHALANGEAL JOINT with second metatarsophalageal joint release and second metatarsal osteotomy or resection and second hammertoe correction with proximal phalangeal joint resection of right foot (Right Foot) Metatarsalgia of right foot; Hallux valgus, right; Hammer toe of right foot  (Metatarsalgia of right foot [M77.41]; Hallux valgus, right [M20.11]; Hammer toe of right foot [M20.41]) MD Suma Comer MD          Anesthesia Type: general, MAC, regional  Last vitals  /77 (01/24/17 1515)    Temp      Pulse 85 (01/24/17 1515)   Resp 16 (01/24/17 1515)    SpO2 96 % (01/24/17 1515)      Post Anesthesia Care and Evaluation    Patient location during evaluation: bedside  Patient participation: complete - patient participated  Level of consciousness: awake  Pain score: 1  Pain management: adequate  Airway patency: patent  Anesthetic complications: No anesthetic complications    Cardiovascular status: acceptable  Respiratory status: acceptable  Hydration status: acceptable

## 2017-01-24 NOTE — PLAN OF CARE
Problem: Perioperative Period (Adult)  Goal: Signs and Symptoms of Listed Potential Problems Will be Absent or Manageable (Perioperative Period)  Outcome: Outcome(s) achieved Date Met:  01/24/17

## 2017-01-24 NOTE — BRIEF OP NOTE
FOOT ARTHRODESIS  Procedure Note    Tesfaye Smith  1/24/2017    Pre-op Diagnosis:   Metatarsalgia of right foot [M77.41]  Hallux valgus, right [M20.11]  Hammer toe of right foot [M20.41]    Post-op Diagnosis:     Post-Op Diagnosis Codes:     * Metatarsalgia of right foot [M77.41]     * Hallux valgus, right [M20.11]     * Hammer toe of right foot [M20.41]    Procedure/CPT® Codes:      Procedure(s):  ARTHRODESIS first METATARSALPHALANGEAL JOINT with second metatarsophalageal joint release and second metatarsal osteotomy  and second hammertoe correction with proximal phalangeal joint fusion of right foot    Surgeon(s):  Jagdeep Medrano MD    Anesthesia: General    Staff:   Circulator: Rosemary Granados RN; Jess Dickey RN  Radiology Technologist: Dia Jay, RRT; Kat Gomez, ALFRED  Scrub Person: Suzie Edmonds; Cynthia Hammond; Farzad Enciso  Vendor Representative: Cesar Lagos    Estimated Blood [id number]     min      Drains: none          Complications: none      Jagdeep Medrano MD     Date: 1/24/2017  Time: 2:17 PM

## 2017-01-24 NOTE — PLAN OF CARE
Problem: Patient Care Overview (Adult)  Goal: Plan of Care Review  Outcome: Outcome(s) achieved Date Met:  01/24/17

## 2017-01-24 NOTE — PLAN OF CARE
Problem: Patient Care Overview (Adult)  Goal: Discharge Needs Assessment  Outcome: Outcome(s) achieved Date Met:  01/24/17

## 2017-01-24 NOTE — ANESTHESIA PREPROCEDURE EVALUATION
Anesthesia Evaluation     Patient summary reviewed    History of anesthetic complications   Airway   Mallampati: III  no difficulty expected  Dental - normal exam     Pulmonary - normal exam   Cardiovascular - normal exam  (+) hypertension,     ECG reviewed    Neuro/Psych  GI/Hepatic/Renal/Endo    (+)  GERD,     Musculoskeletal     Abdominal    Substance History      OB/GYN          Other         Other Comment: glaucoma                        Anesthesia Plan    ASA 2     general, MAC and regional   (Ankle block)  intravenous induction   Anesthetic plan and risks discussed with patient.

## 2017-01-24 NOTE — PLAN OF CARE
Problem: Patient Care Overview (Adult)  Goal: Adult Individualization and Mutuality  Outcome: Outcome(s) achieved Date Met:  01/24/17

## 2017-01-24 NOTE — ANESTHESIA PROCEDURE NOTES
Airway  Urgency: elective    Airway not difficult    General Information and Staff    Patient location during procedure: OR  Anesthesiologist: KEVIN CUTLER  CRNA: ROSA NÚÑEZ    Indications and Patient Condition  Indications for airway management: airway protection    Preoxygenated: yes  MILS not maintained throughout  Mask difficulty assessment: 1 - vent by mask    Final Airway Details  Final airway type: supraglottic airway      Successful airway: classic  Size 5    Number of attempts at approach: 1    Additional Comments  PreO2, IV induction, easy mask, LMA placed w/o difficulty, cuff as packaged- no additional air placed, secured in placed, EBBSH, +etCO2, atraumatic, teeth and lips as preop.

## 2017-01-24 NOTE — ANESTHESIA PROCEDURE NOTES
Peripheral Block    Patient location during procedure: holding area  Start time: 1/24/2017 9:00 AM  Stop time: 1/24/2017 9:06 AM  Reason for block: at surgeon's request and post-op pain management  Performed by  Anesthesiologist: KEVIN CUTLER  Preanesthetic Checklist  Completed: patient identified, site marked, surgical consent, pre-op evaluation, timeout performed, IV checked, risks and benefits discussed and monitors and equipment checked  Peripheral Block Prep:  Sterile barriers:gloves  Prep: ChloraPrep  Patient monitoring: blood pressure monitoring, continuous pulse oximetry and EKG  Peripheral Procedure  Sedation:yes  Guidance:ultrasound guided and landmark technique  Images:still images not obtained  Laterality:rightBlock Type:ankle  Injection Technique:single-shotNeedle Type:echogenic  Needle Gauge:25 G    Medications  Local Injected:ropivacaine 0.5% without epinephrine Local Amount Injected:25mL  Post Assessment  Complications:no

## 2017-01-25 NOTE — OP NOTE
DATE OF PROCEDURE:  01/24/2017.    PREOPERATIVE DIAGNOSES:   1.  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   2.  Right 2nd metatarsal phalangeal dislocation.   3.  Metatarsalgia.  4.  Right 2nd proximal interphalangeal rigid hammertoe.   5.  Retained hardware.      POSTOPERATIVE DIAGNOSES:   1.  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   2.  Right 2nd metatarsal phalangeal dislocation.   3.  Metatarsalgia.  4.  Right 2nd proximal interphalangeal rigid hammertoe.   5.  Retained hardware.      PROCEDURES PERFORMED:  1.  Right 1st metatarsophalangeal arthrodesis using an Arthrex plate and variable pitch compression screw.   2.  Open treatment right 2nd metatarsophalangeal dislocation.   3.  Right 2nd metatarsal Weil shortening osteotomy.   4.  Right 2nd proximal interphalangeal fusion.   5.  Deep hardware removal right first metatarsal.     SURGEON: Jagdeep Medrano MD      ASSISTANT: None.     ANESTHESIA: General LMA.     ESTIMATED BLOOD LOSS: Less than 50 mL.  Tourniquet time: 120 minutes.     DISPOSITION: Taken to recovery room in stable condition.     INDICATIONS: The patient has had bunion correction done many, many years ago and has had severe recurrence of deformity and has had subsequent 2nd metatarsal overload with subsequent metatarsalgia and chronic dislocation of the MTP joint with symptomatic hammering at the PIP joint of the second toe as well. I had a long discussion with him on multiple occasions regarding treatment options with mutual decision made to proceed with right first metatarsophalangeal arthrodesis and open treatment of the right 2nd toe. He understands this is a high risk procedure with potential for complications as outlined previously.     DESCRIPTION OF PROCEDURE: Preoperative informed consent and anesthesia evaluation were obtained. IV antibiotics were administered.  Surgical site was marked. Blocks administered by anesthesia. He was brought to the operating room and placed in a supine position. Anesthesia was induced. LMA was positioned. Well-padded tourniquet was placed on the right proximal thigh.  The right leg was prepped and draped in a sterile fashion. A surgical timeout was performed. The right leg was exsanguinated. Pneumatic tourniquet inflated to 250 mmHg.  I went back through the previous dorsal medial incision and curved this somewhat more dorsally at the proximal and distal extent over the first MTP joint. Full-thickness flaps were elevated down through scar tissue, keeping nice full-thickness areas. The EHL tendon was identified and kept within its sheath. The capsulotomy was made medial to this.  Full-thickness capsular flaps were elevated over the proximal phalanx and distal 2/3 of the metatarsal. The MTP joint had significant deformity to it with some mild arthritic change. Scar tissue and release was performed laterally as well as medially. I then came back further along the metatarsal and identified the 2 pins from his previous osteotomy that were prominent sticking out at least 3-4 mm.  These were grasped with a needle nose pliers and I was able eventually get these out without any fracture. I next sized the metatarsal head and base of the proximal phalanx. These were sized at a 22. I then resected very small sliver of the cartilage and subchondral bone over the distal aspect of the first metatarsal. I then placed the guidewire for the Arthrex reamer down through the central portion of the shaft and reamed using hemispherical reamers removed just enough cartilage to exposed subchondral bone. The rim of this was cleared and shavings were saved. I then was able to mobilize and expose the base of the proximal phalanx.  Again, the guidewire was placed and the hemispherical reamer was used to just remove enough cartilage to get down to subchondral bone and rim  was cleared. I then fenestrated both surfaces multiple times with a 0.062 mm K-wire giving a nice soft cancellous surface. I then carefully positioned the great toe in no more than 10° of valgus so as to be out of the way of the 2nd toe, in about 20-25° of dorsiflexion relative to the first metatarsal axis and neutral rotation. This was then cross pinned and once acceptable position was confirmed, I placed a flat plate beneath the foot in a simulated standing position.  The tip of the toe was 8 to 10 mm above this and contacted with about 40 to 60° of flexion of the IP joint. I then placed an Arthrex variable pitch headless compression screw across this.  The initial one was a little too long and was exchanged. I maintained good position. The Arthrex plate was then placed over this and held with BB tacks. I attempted to put in a cancellous screw distally and did not get good purchase.  I then placed 2 locking screws distally bicortical and 2 locking screws proximally all with good purchase. X-rays confirmed good alignment. There was good compression across the joint and it was well aligned. The wound was irrigated copiously. Attention was then turned to the second MTP joint, which was clinically dislocated. Leaving as wide as skin bridge as possible, an incision was made over this and full-thickness flaps were elevated. The brevis and longus tendons were mobilized. The brevis was resected proximally and the longus was lengthened in a Z fashion. There was extensive amount of scarring, synovitis and pseudocapsule around the base of the proximal phalanx. It was all removed and exposed. The base of the proximal phalanx had a moderate amount of degenerative changes to it. After medial and lateral capsular release as I was then able to reduce the MTP joint. However, there was significant tension on this and still some plantar prominence. I then plantarflexed the 2nd MTP joint and then the metatarsal head had a moderate  "amount of arthritic change over the dorsum.  Plantar it seemed to be in better condition. I then used a sagittal saw and performed \"Weil\" osteotomy in a plane that was parallel to the plantar aspect of the foot as possible. I did this with a double osteotomies in parallel fashion. I removed a small wafer of bone so as not to overly plantarly translate the metatarsal head. I was then able to translate this approximately 7 mm proximally such that I was then able to reduce the MTP joint without undue tension. This was then secured with an Arthrex 12 mm twist off screw with good compression. Residual overhanging bone was removed with a rongeur. I then held the foot in a simulated standing position. The metatarsophalangeal joint was reduced and there was not significant plantar prominence noted. The PIP joint was then approach. The tourniquet was released and hemostasis was obtained from the other wounds. Skin and capsular ellipses were removed from the 2nd PIP joint. Collaterals were released and exposed the distal aspect of the proximal phalanx and resected this below the condylar flare with a bone biter. I then used a small rongeur to remove cartilage from the base of the middle phalanx. This gave good bony opposition of 2 cancellous surfaces. A 0.062 mm K-wire was then passed out through the base of the middle phalanx through the tip of the toe. The PIP joint was reduced and cross pinned. I then held the 2nd MTP joint in neutral alignment in all planes without significant abutment to the 1st toe and was able to pin across the MTP joint without disrupting the osteotomy or the screw. I held the foot in a simulated standing position there was good neutral alignment to both toes and the 2nd toe maintained brisk capillary refill. The wounds were again irrigated copiously with dilute Betadine solution. The 1st MTP joint wound capsule was closed with 3-0 Vicryl and skin was closed with 3-0 Vicryl and staples. Extensor " tendons were reapproximated and the 2nd metatarsal wound was closed with 3-0 Vicryl and staples. The PIP wound was closed with 4-0 nylon with central roll stitch incorporating the extensor tendon and medial and lateral interrupted sutures. The pin was bent, clipped and capped. Sterile forefoot and ankle bunion spica dressing was applied. He was awakened, transferred to stretcher, and taken to recovery room in stable condition.       DARLENE Mansfield:dave  D:   01/25/2017 09:58:37  T:   01/25/2017 14:00:40  Job ID:   [id number]  Document ID:   [id number]  cc:

## 2017-02-02 ENCOUNTER — OFFICE VISIT (OUTPATIENT)
Dept: ORTHOPEDIC SURGERY | Facility: CLINIC | Age: 62
End: 2017-02-02

## 2017-02-02 VITALS — BODY MASS INDEX: 27.09 KG/M2 | TEMPERATURE: 97.7 F | WEIGHT: 200 LBS | HEIGHT: 72 IN

## 2017-02-02 DIAGNOSIS — M77.41 METATARSALGIA OF RIGHT FOOT: ICD-10-CM

## 2017-02-02 DIAGNOSIS — M20.11 HALLUX VALGUS, RIGHT: ICD-10-CM

## 2017-02-02 DIAGNOSIS — S93.129D: Primary | ICD-10-CM

## 2017-02-02 DIAGNOSIS — M20.41 HAMMER TOE OF RIGHT FOOT: ICD-10-CM

## 2017-02-02 PROCEDURE — 29540 STRAPPING ANKLE &/FOOT: CPT | Performed by: ORTHOPAEDIC SURGERY

## 2017-02-02 PROCEDURE — 99024 POSTOP FOLLOW-UP VISIT: CPT | Performed by: ORTHOPAEDIC SURGERY

## 2017-02-02 PROCEDURE — 73630 X-RAY EXAM OF FOOT: CPT | Performed by: ORTHOPAEDIC SURGERY

## 2017-02-02 RX ORDER — LISINOPRIL AND HYDROCHLOROTHIAZIDE 25; 20 MG/1; MG/1
1 TABLET ORAL DAILY
COMMUNITY
Start: 2017-01-21 | End: 2017-10-16 | Stop reason: SDUPTHER

## 2017-02-02 RX ORDER — OXYCODONE HYDROCHLORIDE AND ACETAMINOPHEN 5; 325 MG/1; MG/1
1-2 TABLET ORAL EVERY 4 HOURS PRN
Qty: 60 TABLET | Refills: 0 | Status: SHIPPED | OUTPATIENT
Start: 2017-02-02 | End: 2017-02-20 | Stop reason: SDUPTHER

## 2017-02-02 NOTE — PROGRESS NOTES
"Foot Follow Up      Patient: Tesfaye Smith    YOB: 1955 61 y.o. male    Chief Complaints: Foot doing okay    History of Present Illness: Follows up up extensive right forefoot surgery from 1/24/17.  He's had moderate complaints of intermittent aching pain and feelings of stiffness in his lesser toes.  He's been nonweightbearing overall doing well.  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  Right foot incisions are healing very nicely with only mild swelling.  There is brisk capillary refill to the toes.  He tends to hold his third fourth and fifth toes somewhat cocked up.  Work on some range of motion exercises and this improved.  Right calf is nontender without sign of DVT      Radiology: 3 views of the right foot were ordered to evaluate postoperative alignment reviewed and compared with preoperative x-rays hardware is well contained on the first MTP joint which shows good alignment also well contained on the second MTP joint which appears to remain reduced and congruent PIP joint is in good alignment      Assessment/Plan:  Status post right first MTP fusion with open treatment of right second MTP dislocation and second metatarsal osteotomy and PIP fusion.  Sutures and staples removed Steri-Strips are applied placed him back into a very well-padded 4 foot and ankle bunion hammertoe spica dressing.  Continue strict elevation and nonweightbearing refilled Percocet 5 mg #60 Controlled substance treatment options discussed in detail.  Patient's signed " consent to medical options on file.  CHANNING form in chart.  Risks of narcotic medication usage outlined.  Possibility of physical and psychological dependence and abuse, especially long-term, emphasized to the patient.  Told me how much he appreciates all of my care and I will see him back in 2 weeks' x-rays of his right foot

## 2017-02-20 ENCOUNTER — OFFICE VISIT (OUTPATIENT)
Dept: ORTHOPEDIC SURGERY | Facility: CLINIC | Age: 62
End: 2017-02-20

## 2017-02-20 VITALS — BODY MASS INDEX: 27.09 KG/M2 | TEMPERATURE: 98 F | WEIGHT: 200 LBS | HEIGHT: 72 IN

## 2017-02-20 DIAGNOSIS — M20.11 HALLUX VALGUS, RIGHT: ICD-10-CM

## 2017-02-20 DIAGNOSIS — M77.41 METATARSALGIA OF RIGHT FOOT: ICD-10-CM

## 2017-02-20 DIAGNOSIS — M20.41 HAMMER TOE OF RIGHT FOOT: ICD-10-CM

## 2017-02-20 DIAGNOSIS — S93.129D: ICD-10-CM

## 2017-02-20 DIAGNOSIS — Z98.890 S/P FOOT SURGERY, RIGHT: Primary | ICD-10-CM

## 2017-02-20 PROCEDURE — 73630 X-RAY EXAM OF FOOT: CPT | Performed by: ORTHOPAEDIC SURGERY

## 2017-02-20 PROCEDURE — 99024 POSTOP FOLLOW-UP VISIT: CPT | Performed by: ORTHOPAEDIC SURGERY

## 2017-02-20 PROCEDURE — 29540 STRAPPING ANKLE &/FOOT: CPT | Performed by: ORTHOPAEDIC SURGERY

## 2017-02-20 PROCEDURE — 20670 REMOVAL IMPLANT SUPERFICIAL: CPT | Performed by: ORTHOPAEDIC SURGERY

## 2017-02-20 RX ORDER — OXYCODONE HYDROCHLORIDE AND ACETAMINOPHEN 5; 325 MG/1; MG/1
1-2 TABLET ORAL EVERY 4 HOURS PRN
Qty: 40 TABLET | Refills: 0 | Status: SHIPPED | OUTPATIENT
Start: 2017-02-20 | End: 2017-09-13

## 2017-02-20 NOTE — PROGRESS NOTES
"Foot Follow Up      Patient: Tesfaye Smith    YOB: 1955 61 y.o. male    Chief Complaints: Foot doing okay    History of Present Illness: Follows up from extensive right forefoot surgery on 1/24/17 he's been nonweightbearing only mild intermittent achy pain mostly at night requiring use of narcotic pain medication.  He has not been taking any Modic.  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  Wounds are healing nicely without sign of infection.  There is brisk capillary refill to his toes with neutral alignment of the first and second toes.      Radiology: 3 views of the right foot were ordered today to evaluate postoperative alignment reviewed and compared with x-rays from her last visit.  Hardware remains well contained osteotomies appear to be in good alignment first MTP joint is well reduced      Assessment/Plan:  Status post right first MTP fusion with open treatment of right second MTP dislocation and PIP hammertoe.  Overall he is doing quite well.  Refilled Percocet 5 mg one to 2 by mouth daily at bedtime as needed for pain #40 with no refills.  This pin was removed without difficulty pin site was clear and is placed back into a 4 foot and ankle bunion spica dressing.  He may do 25-50% partial foot flat weightbearing with crutches and follow-up in 2 weeks' x-rays of his right foot. Controlled substance treatment options discussed in detail.  Patient's signed consent to medical options on file.  CHANNING form in chart.  Risks of narcotic medication usage " outlined.  Possibility of physical and psychological dependence and abuse, especially long-term, emphasized to the patient.

## 2017-03-06 ENCOUNTER — OFFICE VISIT (OUTPATIENT)
Dept: ORTHOPEDIC SURGERY | Facility: CLINIC | Age: 62
End: 2017-03-06

## 2017-03-06 VITALS — BODY MASS INDEX: 27.09 KG/M2 | TEMPERATURE: 98 F | HEIGHT: 72 IN | WEIGHT: 200 LBS

## 2017-03-06 DIAGNOSIS — M20.11 HALLUX VALGUS, RIGHT: ICD-10-CM

## 2017-03-06 DIAGNOSIS — M20.41 HAMMER TOE OF RIGHT FOOT: ICD-10-CM

## 2017-03-06 DIAGNOSIS — S93.129D: Primary | ICD-10-CM

## 2017-03-06 PROCEDURE — 99024 POSTOP FOLLOW-UP VISIT: CPT | Performed by: ORTHOPAEDIC SURGERY

## 2017-03-06 PROCEDURE — 73630 X-RAY EXAM OF FOOT: CPT | Performed by: ORTHOPAEDIC SURGERY

## 2017-03-06 NOTE — PROGRESS NOTES
"Foot Follow Up      Patient: Tesfaye Smith    YOB: 1955 61 y.o. male    Chief Complaints: Foot doing well    History of Present Illness: Follows up right forefoot surgery from 1/24/17.  Since last visit on 2/20/17 he has been doing partial weightbearing and occasionally not using his crutches with just standing.  It is minimal complains only some mild achy pain in the right forefoot and feeling of stiffness.  He wanted to let me know that overall he is feeling much better.  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  Mild swelling right forefoot incisions are healing without sign of infection.  There is neutral alignment to the first and second toes.  There is limited motion of the right first IP joint and second MTP joint.  Slightly diminished sensation to the tips of the toes but brisk capillary refill.      Radiology: 3 views of the right foot were ordered to evaluate postoperative alignment reviewed and compared with x-rays from her last visit.  There is neutral alignment of the second MTP and PIP joints.  First MTP joint remains well aligned hardware is intact      Assessment/Plan: Status post right first MTP fusion and open treatment of right second MTP dislocation and PIP hammertoe.  We'll allow him to progress to one crutch weightbearing in the contralateral arm which I have discussed and demonstrated for him today with postoperative shoe.  If he does well with that over the next week he may progress to weightbearing as tolerated in his postoperative shoe.  I " will see him back in 2 weeks' x-rays of his right foot.  He may let this get wet in the shower but is not to immerse it

## 2017-03-20 ENCOUNTER — OFFICE VISIT (OUTPATIENT)
Dept: ORTHOPEDIC SURGERY | Facility: CLINIC | Age: 62
End: 2017-03-20

## 2017-03-20 VITALS — BODY MASS INDEX: 27.09 KG/M2 | TEMPERATURE: 98.3 F | WEIGHT: 200 LBS | HEIGHT: 72 IN

## 2017-03-20 DIAGNOSIS — M20.41 HAMMER TOE OF RIGHT FOOT: ICD-10-CM

## 2017-03-20 DIAGNOSIS — M20.11 HALLUX VALGUS, RIGHT: ICD-10-CM

## 2017-03-20 DIAGNOSIS — Z98.890 S/P FOOT SURGERY, RIGHT: Primary | ICD-10-CM

## 2017-03-20 PROCEDURE — 73630 X-RAY EXAM OF FOOT: CPT | Performed by: ORTHOPAEDIC SURGERY

## 2017-03-20 PROCEDURE — 99024 POSTOP FOLLOW-UP VISIT: CPT | Performed by: ORTHOPAEDIC SURGERY

## 2017-03-20 NOTE — PROGRESS NOTES
"Foot Follow Up      Patient: Tesfaye Smith    YOB: 1955 61 y.o. male    Chief Complaints: Foot doing okay    History of Present Illness: 8 weeks out now from right first MTP fusion and open treatment of right second MTP dislocation and hammertoe.  Says he really not having any pain gets an occasional swelling.  He's been just in his postoperative shoe since her last visit and got around several times in the house without the postop shoe.  The pain beneath the second metatarsal he had preoperatively he said is now gone.  Don't think bothers him is some of the stiffness.  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  Mild swelling to the right foot incisions are well-healed.  There is neutral alignment to the first and second toes.  He is relatively stiff to the MTP joint of the second toe and to the PIP joint as well.  Grossly sensate light touch throughout the tips of the toes but slightly diminished sensation brisk capillary refill.      Radiology: 3 views of the right foot were ordered to evaluate postoperative alignment reviewed and compared with x-rays from last visit.  Hardware remains intact over the first MTP joint second MTP joint remains well reduced and second PIP joint as well aligned.      Assessment/Plan:  Status post right forefoot reconstruction as outlined above.  He and his wife told me what a good job he thinks I did and how well he thinks he is doing.  He may start advancing out of his postoperative shoe " into an athletic shoe that has been of a rocker bottom to it and relatively stiff sole.  He will work on some aggressive range of motion exercises for the second MTP joint and the IP joint of his great toe.  Anything worsens he'll let me know otherwise I'll see him back in 6 weeks' x-rays of his right foot.

## 2017-03-21 ENCOUNTER — TELEPHONE (OUTPATIENT)
Dept: ORTHOPEDIC SURGERY | Facility: CLINIC | Age: 62
End: 2017-03-21

## 2017-04-19 RX ORDER — LISINOPRIL AND HYDROCHLOROTHIAZIDE 25; 20 MG/1; MG/1
TABLET ORAL
Qty: 90 TABLET | Refills: 1 | Status: SHIPPED | OUTPATIENT
Start: 2017-04-19 | End: 2017-05-01 | Stop reason: SDUPTHER

## 2017-05-01 ENCOUNTER — OFFICE VISIT (OUTPATIENT)
Dept: ORTHOPEDIC SURGERY | Facility: CLINIC | Age: 62
End: 2017-05-01

## 2017-05-01 VITALS — WEIGHT: 202 LBS | TEMPERATURE: 98.7 F | HEIGHT: 72 IN | BODY MASS INDEX: 27.36 KG/M2

## 2017-05-01 DIAGNOSIS — M20.42 HAMMER TOE OF LEFT FOOT: ICD-10-CM

## 2017-05-01 DIAGNOSIS — M21.961 ACQUIRED DEFORMITY OF RIGHT FOOT: ICD-10-CM

## 2017-05-01 DIAGNOSIS — M20.11 HALLUX VALGUS, RIGHT: ICD-10-CM

## 2017-05-01 DIAGNOSIS — Z98.890 HISTORY OF SURGERY: Primary | ICD-10-CM

## 2017-05-01 PROCEDURE — 73630 X-RAY EXAM OF FOOT: CPT | Performed by: ORTHOPAEDIC SURGERY

## 2017-05-01 PROCEDURE — 99213 OFFICE O/P EST LOW 20 MIN: CPT | Performed by: ORTHOPAEDIC SURGERY

## 2017-06-07 ENCOUNTER — OFFICE VISIT (OUTPATIENT)
Dept: ORTHOPEDIC SURGERY | Facility: CLINIC | Age: 62
End: 2017-06-07

## 2017-06-07 VITALS — BODY MASS INDEX: 27.09 KG/M2 | HEIGHT: 72 IN | TEMPERATURE: 97.7 F | WEIGHT: 200 LBS

## 2017-06-07 DIAGNOSIS — Z98.890 S/P FOOT SURGERY, RIGHT: Primary | ICD-10-CM

## 2017-06-07 DIAGNOSIS — M20.11 HALLUX VALGUS, RIGHT: ICD-10-CM

## 2017-06-07 DIAGNOSIS — M21.961 ACQUIRED DEFORMITY OF RIGHT FOOT: ICD-10-CM

## 2017-06-07 PROCEDURE — 73630 X-RAY EXAM OF FOOT: CPT | Performed by: ORTHOPAEDIC SURGERY

## 2017-06-07 PROCEDURE — 99213 OFFICE O/P EST LOW 20 MIN: CPT | Performed by: ORTHOPAEDIC SURGERY

## 2017-09-13 ENCOUNTER — OFFICE VISIT (OUTPATIENT)
Dept: ORTHOPEDIC SURGERY | Facility: CLINIC | Age: 62
End: 2017-09-13

## 2017-09-13 VITALS — TEMPERATURE: 97.6 F | BODY MASS INDEX: 27.22 KG/M2 | WEIGHT: 201 LBS | HEIGHT: 72 IN

## 2017-09-13 DIAGNOSIS — Z98.890 H/O FOOT SURGERY: Primary | ICD-10-CM

## 2017-09-13 DIAGNOSIS — M20.42 HAMMER TOES OF BOTH FEET: ICD-10-CM

## 2017-09-13 DIAGNOSIS — M20.41 HAMMER TOES OF BOTH FEET: ICD-10-CM

## 2017-09-13 DIAGNOSIS — M20.11 HALLUX VALGUS, RIGHT: ICD-10-CM

## 2017-09-13 PROCEDURE — 73630 X-RAY EXAM OF FOOT: CPT | Performed by: ORTHOPAEDIC SURGERY

## 2017-09-13 PROCEDURE — 99213 OFFICE O/P EST LOW 20 MIN: CPT | Performed by: ORTHOPAEDIC SURGERY

## 2017-09-14 NOTE — PROGRESS NOTES
"Foot Follow Up      Patient: Tesfaye Smith    YOB: 1955 62 y.o. male    Chief Complaints: Foot doing well    History of Present Illness: Follows up right first MTP fusion and open treatment of second MTP dislocation on 1/24/17.  Said he is doing quite well at \"did a wonder\" on his foot.  He has only mild complaints of some stiffness in the second toe when he tries to squat.  He is back to all of his regular activities for work and leisure.    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  Neutral alignment to the right great toe is able to flex and extend the DIP joint well.  His minimal swelling to the second toe which remains neutrally aligned and is stable to anterior drawer.  He has about 60° dorsiflexion of the MTP joint of the second toe.    Left foot shows relatively neutral alignment to the first toe.  There is slight cockup and slight crossover deformity of the second toe with semirigid hammering at the PIP joint both of which are passively correctable to neutral without gross instability      Radiology: 3 views of the right foot were ordered to evaluate postoperative alignment reviewed and compared with previous x-rays the first MTP fusion appears intact.  The second metatarsal osteotomy appears well-healed second PIP joint is in neutral alignment      Assessment/Plan:  Status post right first MTP fusion and open treatment of " second MTP dislocation with hammertoe.    Overall he is doing quite well and encouraged as is he.  He understands he may have residual stiffness but this should continue to improve even up to 12-18 months out from surgery.  Overall he is very pleased with his outcome    Regarding his left second hammertoe.  He's not having any pain associated with this and does not want do anything from a surgical standpoint with it at this time.  I counseled him to keep a very close eye on this and continue with stretching exercises that have shown him today.  If he develops any pain or change in deformity will let me know otherwise I'll see him back as needed.

## 2017-09-20 DIAGNOSIS — I10 ESSENTIAL HYPERTENSION: Primary | ICD-10-CM

## 2017-09-20 DIAGNOSIS — Z00.00 HEALTH CARE MAINTENANCE: ICD-10-CM

## 2017-09-20 DIAGNOSIS — Z12.5 SCREENING FOR PROSTATE CANCER: ICD-10-CM

## 2017-09-21 ENCOUNTER — OFFICE (AMBULATORY)
Dept: URBAN - METROPOLITAN AREA CLINIC 75 | Facility: CLINIC | Age: 62
End: 2017-09-21

## 2017-09-21 VITALS
HEART RATE: 60 BPM | WEIGHT: 196 LBS | DIASTOLIC BLOOD PRESSURE: 78 MMHG | SYSTOLIC BLOOD PRESSURE: 130 MMHG | HEIGHT: 72 IN

## 2017-09-21 DIAGNOSIS — Z86.010 PERSONAL HISTORY OF COLONIC POLYPS: ICD-10-CM

## 2017-09-21 DIAGNOSIS — K21.9 GASTRO-ESOPHAGEAL REFLUX DISEASE WITHOUT ESOPHAGITIS: ICD-10-CM

## 2017-09-21 DIAGNOSIS — K22.70 BARRETT'S ESOPHAGUS WITHOUT DYSPLASIA: ICD-10-CM

## 2017-09-21 PROCEDURE — 99213 OFFICE O/P EST LOW 20 MIN: CPT | Performed by: INTERNAL MEDICINE

## 2017-09-22 LAB — Lab: NORMAL

## 2017-09-27 ENCOUNTER — OFFICE VISIT (OUTPATIENT)
Dept: INTERNAL MEDICINE | Facility: CLINIC | Age: 62
End: 2017-09-27

## 2017-09-27 VITALS
RESPIRATION RATE: 18 BRPM | WEIGHT: 199 LBS | HEART RATE: 77 BPM | OXYGEN SATURATION: 99 % | HEIGHT: 72 IN | BODY MASS INDEX: 26.95 KG/M2 | DIASTOLIC BLOOD PRESSURE: 82 MMHG | SYSTOLIC BLOOD PRESSURE: 122 MMHG

## 2017-09-27 DIAGNOSIS — Z00.00 HEALTH MAINTENANCE EXAMINATION: Primary | ICD-10-CM

## 2017-09-27 DIAGNOSIS — Z23 NEED FOR INFLUENZA VACCINATION: ICD-10-CM

## 2017-09-27 DIAGNOSIS — I10 ESSENTIAL HYPERTENSION: ICD-10-CM

## 2017-09-27 PROCEDURE — 99396 PREV VISIT EST AGE 40-64: CPT | Performed by: INTERNAL MEDICINE

## 2017-09-27 PROCEDURE — 90656 IIV3 VACC NO PRSV 0.5 ML IM: CPT | Performed by: INTERNAL MEDICINE

## 2017-09-27 PROCEDURE — 90471 IMMUNIZATION ADMIN: CPT | Performed by: INTERNAL MEDICINE

## 2017-09-27 NOTE — PROGRESS NOTES
"Chief Complaint  Tesfaye Smith is a 62 y.o. male who presents for Annual Exam (CPE)  .    History of Present Illness   John is here for routine CPE.   Prostate Exam: Dr. Coyle  C-scope:2014 He is due 2019 acc to Dr. Reyez.  He just saw her.      Exercise: \"some\".  He cuts grass and stays very active.      He has had a headache in the middle of the night twice in the last six months.  He only wants to mention it because his dad had a stroke and he wanted to mention it.  It wasn't the worst headache of his life or anything, just a headache.      Immunization History   Administered Date(s) Administered   • Flu Vaccine High Dose PF 65YR+ 08/25/2015   • Flu Vaccine Quad PF >18YRS 11/29/2016, 09/27/2017   • Influenza, Quadrivalent 10/11/2012, 11/13/2013, 10/14/2014   • Tdap 11/08/2008   • Zoster 08/02/2016       Review of Systems   Constitution: Negative for chills, fever and malaise/fatigue.   HENT: Positive for hearing loss. Negative for hoarse voice and sore throat.    Eyes: Negative for blurred vision, double vision and visual disturbance.   Cardiovascular: Negative for chest pain, leg swelling and palpitations.   Respiratory: Negative for cough and shortness of breath.    Endocrine: Negative for polydipsia and polyuria.   Hematologic/Lymphatic: Does not bruise/bleed easily.   Skin: Negative for rash and suspicious lesions.   Musculoskeletal: Negative for arthritis and myalgias.   Gastrointestinal: Negative for abdominal pain, change in bowel habit, constipation, diarrhea, dysphagia, hematochezia, melena, nausea and vomiting.   Genitourinary: Negative for dysuria and hematuria.   Neurological: Negative for dizziness, headaches and light-headedness.   Psychiatric/Behavioral: Negative for depression. The patient is not nervous/anxious.        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, indeterminate stage    • Hypertension    • PONV (postoperative nausea and vomiting)    • TMJ arthralgia        Past Surgical History:   Procedure Laterality Date   • BUNIONECTOMY Right     RIGHT GREAT TOE   • FOOT FUSION Right 1/24/2017    Procedure: ARTHRODESIS first METATARSALPHALANGEAL JOINT with second metatarsophalageal joint release and second metatarsal osteotomy or resection and second hammertoe correction with proximal phalangeal joint resection of right foot;  Surgeon: Jagdeep Medrano MD;  Location: [facility] OR [facility];  Service:    • HERNIA REPAIR         Family History   Problem Relation Age of Onset   • Ovarian cancer Mother    • Rheum arthritis Mother    • Colon cancer Father    • Hypertension Father    • Stroke Father    • Hypertension Sister        Social History     Social History   • Marital status:      Spouse name: N/A   • Number of children: 2   • Years of education: N/A     Occupational History   • retired       Social History Main Topics   • Smoking status: Never Smoker   • Smokeless tobacco: Never Used   • Alcohol use Yes      Comment: occasional   • Drug use: No   • Sexual activity: Defer     Other Topics Concern   • Not on file     Social History Narrative       Current Outpatient Prescriptions on File Prior to Visit   Medication Sig Dispense Refill   • aspirin (ASPIR) 81 MG EC tablet Take 81 mg by mouth daily.     • Cetirizine HCl (ZYRTEC ALLERGY) 10 MG capsule Take 1 tablet by mouth As Needed.     • esomeprazole (NEXIUM) 40 MG capsule Take 40 mg by mouth every morning before breakfast.     • lisinopril-hydrochlorothiazide (PRINZIDE,ZESTORETIC) 20-25 MG per tablet Take 1 tablet by mouth Daily.     • MELOXICAM PO Take  by mouth Every Other Day.     • Multiple  "Vitamins-Minerals (CENTRUM SILVER ADULT 50+) tablet Take 1 tablet by mouth Daily.       No current facility-administered medications on file prior to visit.        No Known Allergies    Vitals:    09/27/17 1252   BP: 122/82   Pulse: 77   Resp: 18   SpO2: 99%   Weight: 199 lb (90.3 kg)   Height: 72\" (182.9 cm)       Body mass index is 26.99 kg/(m^2).    Objective   Physical Exam   Constitutional: He is oriented to person, place, and time. He appears well-developed and well-nourished. No distress.   HENT:   Head: Normocephalic and atraumatic.   Right Ear: Hearing and external ear normal.   Left Ear: Hearing and external ear normal.   Nose: Nose normal.   Mouth/Throat: Oropharynx is clear and moist and mucous membranes are normal.   Eyes: Conjunctivae, EOM and lids are normal. Pupils are equal, round, and reactive to light. No scleral icterus.   Neck: Trachea normal and normal range of motion. Neck supple.   Cardiovascular: Normal rate, regular rhythm and normal heart sounds.  Exam reveals no gallop and no friction rub.    No murmur heard.  Pulses:       Carotid pulses are 2+ on the right side, and 2+ on the left side.       Femoral pulses are 2+ on the right side, and 2+ on the left side.       Dorsalis pedis pulses are 2+ on the right side, and 2+ on the left side.        Posterior tibial pulses are 2+ on the right side, and 2+ on the left side.   Pulmonary/Chest: Effort normal and breath sounds normal. No respiratory distress. He has no wheezes. He has no rales.   Abdominal: Soft. Normal appearance and bowel sounds are normal. He exhibits no distension and no mass. There is no tenderness.   Musculoskeletal: Normal range of motion. He exhibits no edema.       Vascular Status -  His exam exhibits no right foot edema. His exam exhibits no left foot edema.   Skin Integrity  -  His right foot skin is intact.     Tesfaye 's left foot skin is intact. .  Lymphadenopathy:     He has no cervical adenopathy.        Right: No " inguinal and no supraclavicular adenopathy present.        Left: No inguinal and no supraclavicular adenopathy present.   Neurological: He is alert and oriented to person, place, and time. He has normal strength. No cranial nerve deficit. He exhibits normal muscle tone. Coordination and gait normal.   Skin: Skin is warm and dry. No rash noted.   Psychiatric: He has a normal mood and affect. His speech is normal and behavior is normal. Judgment and thought content normal. Cognition and memory are normal.   Vitals reviewed.        Results for orders placed or performed in visit on 09/21/17   Please Note   Result Value Ref Range    Please note Comment        Assessment/Plan   Diagnoses and all orders for this visit:    Health maintenance examination  -     Flu Vaccine Quad PF >18YR    Essential hypertension    Need for influenza vaccination  -     Flu Vaccine Quad PF >18YR        Discussion/Summary  John is here for routine CPE.  He is up to date on all health maintenance.  He recently saw dermatology for a skin check because of all of his tanning.  He was fine. His labs from [email or web address] have not come back. We have called and have waited for the fax.  We will just call him when we get them.         No Follow-up on file.

## 2017-09-28 LAB
ALBUMIN SERPL-MCNC: 4.7 G/DL
ALBUMIN/GLOB SERPL: 2 {RATIO}
ALP SERPL-CCNC: 71 IU/L
ALT SERPL-CCNC: 18 IU/L
AST SERPL-CCNC: 17 IU/L
BASOPHILS # BLD AUTO: 0 X10E3/UL
BASOPHILS NFR BLD AUTO: 0 %
BILIRUB SERPL-MCNC: 0.6 MG/DL
BUN SERPL-MCNC: 20 MG/DL
BUN/CREAT SERPL: 18
CALCIUM SERPL-MCNC: 10 MG/DL
CHLORIDE SERPL-SCNC: 100 MMOL/L (ref 96–106)
CHOLEST SERPL-MCNC: 203 MG/DL
CO2 SERPL-SCNC: 24 MMOL/L
CREAT SERPL-MCNC: 1.09 MG/DL
EOSINOPHIL # BLD AUTO: 0.1 X10E3/UL
EOSINOPHIL NFR BLD AUTO: 1 %
ERYTHROCYTE [DISTWIDTH] IN BLOOD BY AUTOMATED COUNT: 14.3 %
GLOBULIN SER CALC-MCNC: 2.4 G/DL (ref 1.5–4.5)
GLUCOSE SERPL-MCNC: 95 MG/DL (ref 65–99)
HBA1C MFR BLD: 5.1 % (ref 4.8–5.6)
HCT VFR BLD AUTO: 45.3 %
HDLC SERPL-MCNC: 83 MG/DL
HGB BLD-MCNC: 15.6 G/DL
IMM GRANULOCYTES # BLD: 0 X10E3/UL
IMM GRANULOCYTES NFR BLD: 0 %
LDLC SERPL CALC-MCNC: 101 MG/DL
LYMPHOCYTES # BLD AUTO: 1.4 X10E3/UL
LYMPHOCYTES NFR BLD AUTO: 25 %
MCH RBC QN AUTO: 31.5 PG
MCHC RBC AUTO-ENTMCNC: 34.4 G/DL
MCV RBC AUTO: 91 FL
MONOCYTES # BLD AUTO: 0.5 X10E3/UL
MONOCYTES NFR BLD AUTO: 8 %
NEUTROPHILS # BLD AUTO: 3.8 X10E3/UL
NEUTROPHILS NFR BLD AUTO: 66 %
PLATELET # BLD AUTO: 225 X10E3/UL
POTASSIUM SERPL-SCNC: 4.5 MMOL/L
PROT SERPL-MCNC: 7.1 G/DL
PSA SERPL-MCNC: 0.7 NG/ML (ref 0–4)
RBC # BLD AUTO: 4.96 X10E6/UL
SODIUM SERPL-SCNC: 146 MMOL/L (ref 134–144)
TRIGL SERPL-MCNC: 97 MG/DL
TSH SERPL DL<=0.005 MIU/L-ACNC: 0.81 UIU/ML (ref 0.45–4.5)
VLDLC SERPL CALC-MCNC: 19 MG/DL (ref 5–40)
WBC # BLD AUTO: 5.8 X10E3/UL

## 2017-10-01 LAB
APPEARANCE UR: ABNORMAL
BILIRUB UR QL STRIP: NEGATIVE
COLOR UR: YELLOW
GLUCOSE UR QL: NEGATIVE
HGB UR QL STRIP: NEGATIVE
KETONES UR QL STRIP: NEGATIVE
LEUKOCYTE ESTERASE UR QL STRIP: NEGATIVE
MICRO URNS: ABNORMAL
NITRITE UR QL STRIP: NEGATIVE
PH UR STRIP: 7.5 [PH] (ref 5–7.5)
PROT UR QL STRIP: NEGATIVE
SP GR UR: 1.02 (ref 1–1.03)
URINALYSIS REFLEX: ABNORMAL
UROBILINOGEN UR STRIP-MCNC: 0.2 MG/DL (ref 0.2–1)
WRITTEN AUTHORIZATION: NORMAL

## 2017-10-16 RX ORDER — LISINOPRIL AND HYDROCHLOROTHIAZIDE 25; 20 MG/1; MG/1
TABLET ORAL
Qty: 90 TABLET | Refills: 3 | Status: SHIPPED | OUTPATIENT
Start: 2017-10-16 | End: 2018-10-11 | Stop reason: SDUPTHER

## 2018-08-08 RX ORDER — MELOXICAM 7.5 MG/1
TABLET ORAL
Qty: 45 TABLET | Refills: 3 | Status: SHIPPED | OUTPATIENT
Start: 2018-08-08 | End: 2020-09-30

## 2018-08-08 NOTE — TELEPHONE ENCOUNTER
Refill on Meloxicam 7.5mg, toke one po every other day.   New insurance, member id number [id number]

## 2018-10-11 RX ORDER — LISINOPRIL AND HYDROCHLOROTHIAZIDE 25; 20 MG/1; MG/1
TABLET ORAL
Qty: 30 TABLET | Refills: 0 | Status: SHIPPED | OUTPATIENT
Start: 2018-10-11 | End: 2018-11-06 | Stop reason: SDUPTHER

## 2018-11-06 RX ORDER — LISINOPRIL AND HYDROCHLOROTHIAZIDE 25; 20 MG/1; MG/1
TABLET ORAL
Qty: 30 TABLET | Refills: 0 | Status: SHIPPED | OUTPATIENT
Start: 2018-11-06 | End: 2019-01-17 | Stop reason: SDUPTHER

## 2019-01-14 RX ORDER — LISINOPRIL AND HYDROCHLOROTHIAZIDE 25; 20 MG/1; MG/1
TABLET ORAL
Qty: 30 TABLET | Refills: 0 | OUTPATIENT
Start: 2019-01-14

## 2019-01-17 ENCOUNTER — OFFICE VISIT (OUTPATIENT)
Dept: INTERNAL MEDICINE | Facility: CLINIC | Age: 64
End: 2019-01-17

## 2019-01-17 VITALS
SYSTOLIC BLOOD PRESSURE: 120 MMHG | BODY MASS INDEX: 25.19 KG/M2 | RESPIRATION RATE: 18 BRPM | HEIGHT: 72 IN | WEIGHT: 186 LBS | DIASTOLIC BLOOD PRESSURE: 84 MMHG | HEART RATE: 77 BPM | OXYGEN SATURATION: 98 %

## 2019-01-17 DIAGNOSIS — Z23 NEED FOR PNEUMOCOCCAL VACCINATION: ICD-10-CM

## 2019-01-17 DIAGNOSIS — I10 ESSENTIAL HYPERTENSION: Primary | ICD-10-CM

## 2019-01-17 PROCEDURE — G0009 ADMIN PNEUMOCOCCAL VACCINE: HCPCS | Performed by: INTERNAL MEDICINE

## 2019-01-17 PROCEDURE — 99213 OFFICE O/P EST LOW 20 MIN: CPT | Performed by: INTERNAL MEDICINE

## 2019-01-17 PROCEDURE — 90670 PCV13 VACCINE IM: CPT | Performed by: INTERNAL MEDICINE

## 2019-01-17 RX ORDER — LISINOPRIL AND HYDROCHLOROTHIAZIDE 25; 20 MG/1; MG/1
1 TABLET ORAL DAILY
Qty: 90 TABLET | Refills: 1 | Status: SHIPPED | OUTPATIENT
Start: 2019-01-17 | End: 2019-09-06 | Stop reason: SDUPTHER

## 2019-01-17 NOTE — PROGRESS NOTES
Chief Complaint  Tesfaye Smith is a 63 y.o. male who presents for Hypertension; Med Management; and Follow-up (Pt has not been seen in over a year, lisinopril refill needed. )  .    History of Present Illness   John is here for routine follow up on his HTN.  He has not been seen in over a year.  No cp or palp or dizziness or soa.  He had been getting more exercise when the weather was warmer.  He tries to stay active.  No new issues.  He's generally feeling good.        Review of Systems   Cardiovascular: Negative for chest pain, dyspnea on exertion, leg swelling and palpitations.   Neurological: Negative for dizziness and light-headedness.       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 [place] [facility] [place]  Service:    • HERNIA REPAIR         Family History   Problem Relation Age of Onset   • Ovarian cancer Mother    • Rheum arthritis Mother    • Colon cancer Father    • Hypertension Father    • Stroke Father    • Hypertension Sister        Social History     Socioeconomic History   • Marital status:      Spouse name: Not on file   • Number of children: 2   • Years  "of education: Not on file   • Highest education level: Not on file   Social Needs   • Financial resource strain: Not on file   • Food insecurity - worry: Not on file   • Food insecurity - inability: Not on file   • Transportation needs - medical: Not on file   • Transportation needs - non-medical: Not on file   Occupational History   • Occupation: retired    Tobacco Use   • Smoking status: Never Smoker   • Smokeless tobacco: Never Used   Substance and Sexual Activity   • Alcohol use: Yes     Comment: occasional   • Drug use: No   • Sexual activity: Defer   Other Topics Concern   • Not on file   Social History Narrative   • Not on file       Current Outpatient Medications on File Prior to Visit   Medication Sig Dispense Refill   • aspirin (ASPIR) 81 MG EC tablet Take 81 mg by mouth daily.     • esomeprazole (NEXIUM) 40 MG capsule Take 40 mg by mouth every morning before breakfast.     • meloxicam (MOBIC) 7.5 MG tablet Take one tablet by mouth every other day. 45 tablet 3   • Multiple Vitamins-Minerals (CENTRUM SILVER ADULT 50+) tablet Take 1 tablet by mouth Daily.     • [DISCONTINUED] lisinopril-hydrochlorothiazide (PRINZIDE,ZESTORETIC) 20-25 MG per tablet TAKE 1 TABLET DAILY (NEED AN APPOINTMENT BEFORE MORE REFILLS ARE DISPENSED) 30 tablet 0   • Cetirizine HCl (ZYRTEC ALLERGY) 10 MG capsule Take 1 tablet by mouth As Needed.     • [DISCONTINUED] MELOXICAM PO Take  by mouth Every Other Day.       No current facility-administered medications on file prior to visit.        No Known Allergies    Vitals:    01/17/19 1019   BP: 120/84   Pulse: 77   Resp: 18   SpO2: 98%   Weight: 84.4 kg (186 lb)   Height: 182.9 cm (72\")       Body mass index is 25.23 kg/m².    Objective   Physical Exam   Constitutional: He is oriented to person, place, and time. He appears well-developed and well-nourished. No distress.   HENT:   Head: Normocephalic and atraumatic.   Eyes: Conjunctivae are normal. No scleral icterus. "   Neck: Normal range of motion. Neck supple.   Cardiovascular: Normal rate, regular rhythm and normal heart sounds. Exam reveals no gallop and no friction rub.   No murmur heard.  Pulmonary/Chest: Effort normal and breath sounds normal. No respiratory distress. He has no wheezes. He has no rales.   Musculoskeletal: Normal range of motion. He exhibits no edema.   Lymphadenopathy:     He has no cervical adenopathy.   Neurological: He is alert and oriented to person, place, and time. No cranial nerve deficit.   Psychiatric: He has a normal mood and affect. His behavior is normal. Judgment and thought content normal.       Results for orders placed or performed in visit on 09/21/17   Please Note   Result Value Ref Range    Please note Comment    Comprehensive Metabolic Panel   Result Value Ref Range    Glucose 95 65 - 99 mg/dL    BUN 20 mg/dL    Creatinine 1.09 mg/dL    eGFR Non African Am CANCELED mL/min/1.73    eGFR African Am CANCELED mL/min/1.73    BUN/Creatinine Ratio 18     Sodium 146 (H) 134 - 144 mmol/L    Potassium 4.5 mmol/L    Chloride 100 96 - 106 mmol/L    Total CO2 24 mmol/L    Calcium 10.0 mg/dL    Total Protein 7.1 g/dL    Albumin 4.7 g/dL    Globulin 2.4 1.5 - 4.5 g/dL    A/G Ratio 2.0     Total Bilirubin 0.6 mg/dL    Alkaline Phosphatase 71 IU/L    AST (SGOT) 17 IU/L    ALT (SGPT) 18 IU/L   Lipid Panel   Result Value Ref Range    Total Cholesterol 203 mg/dL    Triglycerides 97 mg/dL    HDL Cholesterol 83 >39 mg/dL    VLDL Cholesterol 19 5 - 40 mg/dL    LDL Cholesterol  101 mg/dL   Hemoglobin A1c   Result Value Ref Range    Hemoglobin A1C 5.1 4.8 - 5.6 %   PSA   Result Value Ref Range    PSA 0.7 0.0 - 4.0 ng/mL   TSH Rfx On Abnormal To Free T4   Result Value Ref Range    TSH 0.812 0.450 - 4.500 uIU/mL   Written Authorization   Result Value Ref Range    Written Authorization Comment    UA / M With / Rflx Culture([facility] ONLY)   Result Value Ref Range    Specific Gravity, UA 1.022 1.005 - 1.030    pH, UA  7.5 5.0 - 7.5    Color, UA Yellow Yellow    Appearance, UA Cloudy (A) Clear    Leukocytes, UA Negative Negative    Protein Negative Negative/Trace    Glucose, UA Negative Negative    Ketones Negative Negative    Blood, UA Negative Negative    Bilirubin, UA Negative Negative    Urobilinogen, UA 0.2 0.2 - 1.0 mg/dL    Nitrite, UA Negative Negative    Microscopic Examination Comment     Urinalysis Reflex Comment    CBC & Differential   Result Value Ref Range    WBC 5.8 x10E3/uL    RBC 4.96 x10E6/uL    Hemoglobin 15.6 g/dL    Hematocrit 45.3 %    MCV 91 fL    MCH 31.5 pg    MCHC 34.4 g/dL    RDW 14.3 %    Platelets 225 x10E3/uL    Neutrophil Rel % 66 %    Lymphocyte Rel % 25 %    Monocyte Rel % 8 %    Eosinophil Rel % 1 %    Basophil Rel % 0 %    Neutrophils Absolute 3.8 x10E3/uL    Lymphocytes Absolute 1.4 x10E3/uL    Monocytes Absolute 0.5 x10E3/uL    Eosinophils Absolute 0.1 x10E3/uL    Basophils Absolute 0.0 x10E3/uL    Immature Granulocyte Rel % 0 %    Immature Grans Absolute 0.0 x10E3/uL       Assessment/Plan   Diagnoses and all orders for this visit:    Essential hypertension  -     Comprehensive Metabolic Panel; Future  -     Lipid Panel; Future  -     Urinalysis With Culture If Indicated - Urine, Clean Catch; Future    Need for pneumococcal vaccination  -     Pneumococcal Conjugate Vaccine 13-[facility] All    Other orders  -     lisinopril-hydrochlorothiazide (PRINZIDE,ZESTORETIC) 20-25 MG per tablet; Take 1 tablet by mouth Daily.        Discussion/Summary  John is here for follow up on his HTN.  BP is controlled.  He's due for labs.  Will bring him back fasting.  Continue current dose of lisinopril/hctz.  Continue regular exercise.    Return for FASTING LABS SOON; schedule CPE before August for Welcome to Medicare physical.

## 2019-01-18 ENCOUNTER — RESULTS ENCOUNTER (OUTPATIENT)
Dept: INTERNAL MEDICINE | Facility: CLINIC | Age: 64
End: 2019-01-18

## 2019-01-18 DIAGNOSIS — I10 ESSENTIAL HYPERTENSION: ICD-10-CM

## 2019-01-22 LAB
ALBUMIN SERPL-MCNC: 4.4 G/DL (ref 3.5–5.2)
ALBUMIN/GLOB SERPL: 2 G/DL
ALP SERPL-CCNC: 55 U/L (ref 39–117)
ALT SERPL-CCNC: 23 U/L (ref 1–41)
AST SERPL-CCNC: 18 U/L (ref 1–40)
BILIRUB SERPL-MCNC: 0.5 MG/DL (ref 0.1–1.2)
BUN SERPL-MCNC: 18 MG/DL (ref 8–23)
BUN/CREAT SERPL: 15.9 (ref 7–25)
CALCIUM SERPL-MCNC: 9.8 MG/DL (ref 8.6–10.5)
CHLORIDE SERPL-SCNC: 103 MMOL/L (ref 98–107)
CHOLEST SERPL-MCNC: 194 MG/DL (ref 0–200)
CO2 SERPL-SCNC: 29.2 MMOL/L (ref 22–29)
CREAT SERPL-MCNC: 1.13 MG/DL (ref 0.76–1.27)
GLOBULIN SER CALC-MCNC: 2.2 GM/DL
GLUCOSE SERPL-MCNC: 99 MG/DL (ref 65–99)
HDLC SERPL-MCNC: 83 MG/DL (ref 40–60)
LDLC SERPL CALC-MCNC: 102 MG/DL (ref 0–100)
POTASSIUM SERPL-SCNC: 4.9 MMOL/L (ref 3.5–5.2)
PROT SERPL-MCNC: 6.6 G/DL (ref 6–8.5)
SODIUM SERPL-SCNC: 144 MMOL/L (ref 136–145)
TRIGL SERPL-MCNC: 45 MG/DL (ref 0–150)
VLDLC SERPL CALC-MCNC: 9 MG/DL (ref 5–40)

## 2019-09-06 ENCOUNTER — OFFICE VISIT (OUTPATIENT)
Dept: FAMILY MEDICINE CLINIC | Facility: CLINIC | Age: 64
End: 2019-09-06

## 2019-09-06 VITALS
DIASTOLIC BLOOD PRESSURE: 91 MMHG | TEMPERATURE: 97.8 F | WEIGHT: 202 LBS | HEIGHT: 72 IN | RESPIRATION RATE: 18 BRPM | HEART RATE: 58 BPM | BODY MASS INDEX: 27.36 KG/M2 | OXYGEN SATURATION: 100 % | SYSTOLIC BLOOD PRESSURE: 153 MMHG

## 2019-09-06 DIAGNOSIS — I10 ESSENTIAL HYPERTENSION: Primary | ICD-10-CM

## 2019-09-06 DIAGNOSIS — K21.9 GASTROESOPHAGEAL REFLUX DISEASE, ESOPHAGITIS PRESENCE NOT SPECIFIED: ICD-10-CM

## 2019-09-06 PROCEDURE — 99203 OFFICE O/P NEW LOW 30 MIN: CPT | Performed by: FAMILY MEDICINE

## 2019-09-06 RX ORDER — LISINOPRIL AND HYDROCHLOROTHIAZIDE 25; 20 MG/1; MG/1
1 TABLET ORAL DAILY
Qty: 90 TABLET | Refills: 1 | Status: SHIPPED | OUTPATIENT
Start: 2019-09-06 | End: 2020-03-06

## 2019-09-06 RX ORDER — ESOMEPRAZOLE MAGNESIUM 40 MG/1
40 CAPSULE, DELAYED RELEASE ORAL
Qty: 90 CAPSULE | Refills: 1 | Status: SHIPPED | OUTPATIENT
Start: 2019-09-06 | End: 2020-02-17

## 2019-09-06 NOTE — PROGRESS NOTES
Subjective   Tesfaye Smith is a 64 y.o. male.     64-year-old male who is a new patient to me presents today to discuss his hypertension and reflux.    Hypertension: Patient is on lisinopril-hydrochlorothiazide 20-25 mg a day.  Blood pressure in the office today is 153/91.  Reports he is taking his medication regularly.  He denies chest pain shortness breath headache vision changes.  Manual recheck is 151/78.    Heartburn/reflux: Patient takes Nexium 40 mg a day.  This controls his symptoms well. Reports h/o Almaraz's esophagus. Scheduled for EGD and C-scope on October 6, 2019.    Needs Tdap; would like to get at the pharmacy however because it will be cheaper there.  Not due for next pneumonia vaccine yet.         PHQ-2/PHQ-9 Depression Screening 9/6/2019   Little interest or pleasure in doing things 0   Feeling down, depressed, or hopeless 0   Total Score 0       The following portions of the patient's history were reviewed and updated as appropriate: allergies, current medications, past family history, past medical history, past social history, past surgical history and problem list.    Review of Systems   Constitutional: Negative for chills, fatigue and fever.   HENT: Negative for congestion.    Respiratory: Negative for apnea and shortness of breath.    Cardiovascular: Negative for chest pain, palpitations and leg swelling.   Genitourinary: Negative for nocturia.   Psychiatric/Behavioral: Negative for sleep disturbance, depressed mood and stress. The patient is not nervous/anxious.        Objective   Physical Exam   Constitutional: He is oriented to person, place, and time. He appears well-developed and well-nourished.   HENT:   Head: Normocephalic and atraumatic.   Eyes: EOM are normal. Pupils are equal, round, and reactive to light.   Neck: Normal range of motion. Neck supple.   Cardiovascular: Normal rate, regular rhythm and normal heart sounds.   No murmur heard.  Pulmonary/Chest: Effort normal and breath  sounds normal. No stridor. No respiratory distress. He has no wheezes. He has no rales.   Neurological: He is alert and oriented to person, place, and time.   Skin: Skin is warm.   Psychiatric: He has a normal mood and affect. His behavior is normal.               Assessment/Plan     Tesfaye was seen today for establish care, hypertension and heartburn.    Diagnoses and all orders for this visit:    Essential hypertension  -     Comprehensive Metabolic Panel  -     Lipid Panel  -     TSH Rfx On Abnormal To Free T4  -     lisinopril-hydrochlorothiazide (PRINZIDE,ZESTORETIC) 20-25 MG per tablet; Take 1 tablet by mouth Daily.    Gastroesophageal reflux disease, esophagitis presence not specified  -     CBC & Differential  -     esomeprazole (NEXIUM) 40 MG capsule; Take 1 capsule by mouth Every Morning Before Breakfast.      Refill meds as noted above.  Follow-up labs.  Return to clinic in 2 months with blood pressure log.  Goal is to be less than 140/90.  We discussed the DASH diet and he notes that he already is very minimal to salt.  If any new or worsening symptoms go to ER.  Patient expressed understanding and agreeable to plan.  Discussed not taking meloxicam consistently to try Tylenol instead.  Patient said he will look into it.

## 2019-09-07 LAB
ALBUMIN SERPL-MCNC: 4.4 G/DL (ref 3.5–5.2)
ALBUMIN/GLOB SERPL: 1.9 G/DL
ALP SERPL-CCNC: 57 U/L (ref 39–117)
ALT SERPL-CCNC: 18 U/L (ref 1–41)
AST SERPL-CCNC: 17 U/L (ref 1–40)
BASOPHILS # BLD AUTO: 0.05 10*3/MM3 (ref 0–0.2)
BASOPHILS NFR BLD AUTO: 0.7 % (ref 0–1.5)
BILIRUB SERPL-MCNC: 0.3 MG/DL (ref 0.2–1.2)
BUN SERPL-MCNC: 19 MG/DL (ref 8–23)
BUN/CREAT SERPL: 17.8 (ref 7–25)
CALCIUM SERPL-MCNC: 9.4 MG/DL (ref 8.6–10.5)
CHLORIDE SERPL-SCNC: 100 MMOL/L (ref 98–107)
CHOLEST SERPL-MCNC: 177 MG/DL (ref 0–200)
CO2 SERPL-SCNC: 29.9 MMOL/L (ref 22–29)
CREAT SERPL-MCNC: 1.07 MG/DL (ref 0.76–1.27)
EOSINOPHIL # BLD AUTO: 0.09 10*3/MM3 (ref 0–0.4)
EOSINOPHIL NFR BLD AUTO: 1.3 % (ref 0.3–6.2)
ERYTHROCYTE [DISTWIDTH] IN BLOOD BY AUTOMATED COUNT: 13.9 % (ref 12.3–15.4)
GLOBULIN SER CALC-MCNC: 2.3 GM/DL
GLUCOSE SERPL-MCNC: 86 MG/DL (ref 65–99)
HCT VFR BLD AUTO: 47.4 % (ref 37.5–51)
HDLC SERPL-MCNC: 85 MG/DL (ref 40–60)
HGB BLD-MCNC: 15.2 G/DL (ref 13–17.7)
IMM GRANULOCYTES # BLD AUTO: 0.02 10*3/MM3 (ref 0–0.05)
IMM GRANULOCYTES NFR BLD AUTO: 0.3 % (ref 0–0.5)
LDLC SERPL CALC-MCNC: 81 MG/DL (ref 0–100)
LYMPHOCYTES # BLD AUTO: 1.52 10*3/MM3 (ref 0.7–3.1)
LYMPHOCYTES NFR BLD AUTO: 22.2 % (ref 19.6–45.3)
MCH RBC QN AUTO: 31.2 PG (ref 26.6–33)
MCHC RBC AUTO-ENTMCNC: 32.1 G/DL (ref 31.5–35.7)
MCV RBC AUTO: 97.3 FL (ref 79–97)
MONOCYTES # BLD AUTO: 0.65 10*3/MM3 (ref 0.1–0.9)
MONOCYTES NFR BLD AUTO: 9.5 % (ref 5–12)
NEUTROPHILS # BLD AUTO: 4.52 10*3/MM3 (ref 1.7–7)
NEUTROPHILS NFR BLD AUTO: 66 % (ref 42.7–76)
NRBC BLD AUTO-RTO: 0 /100 WBC (ref 0–0.2)
PLATELET # BLD AUTO: 191 10*3/MM3 (ref 140–450)
POTASSIUM SERPL-SCNC: 4.2 MMOL/L (ref 3.5–5.2)
PROT SERPL-MCNC: 6.7 G/DL (ref 6–8.5)
RBC # BLD AUTO: 4.87 10*6/MM3 (ref 4.14–5.8)
SODIUM SERPL-SCNC: 141 MMOL/L (ref 136–145)
TRIGL SERPL-MCNC: 55 MG/DL (ref 0–150)
TSH SERPL DL<=0.005 MIU/L-ACNC: 0.69 UIU/ML (ref 0.27–4.2)
VLDLC SERPL CALC-MCNC: 11 MG/DL
WBC # BLD AUTO: 6.85 10*3/MM3 (ref 3.4–10.8)

## 2019-10-04 VITALS
DIASTOLIC BLOOD PRESSURE: 70 MMHG | SYSTOLIC BLOOD PRESSURE: 117 MMHG | SYSTOLIC BLOOD PRESSURE: 110 MMHG | RESPIRATION RATE: 10 BRPM | WEIGHT: 200 LBS | DIASTOLIC BLOOD PRESSURE: 72 MMHG | HEART RATE: 61 BPM | RESPIRATION RATE: 19 BRPM | RESPIRATION RATE: 20 BRPM | RESPIRATION RATE: 14 BRPM | HEART RATE: 79 BPM | DIASTOLIC BLOOD PRESSURE: 64 MMHG | DIASTOLIC BLOOD PRESSURE: 66 MMHG | HEART RATE: 63 BPM | OXYGEN SATURATION: 97 % | HEART RATE: 69 BPM | OXYGEN SATURATION: 100 % | RESPIRATION RATE: 21 BRPM | TEMPERATURE: 97.6 F | SYSTOLIC BLOOD PRESSURE: 114 MMHG | SYSTOLIC BLOOD PRESSURE: 103 MMHG | SYSTOLIC BLOOD PRESSURE: 149 MMHG | HEART RATE: 62 BPM | HEART RATE: 60 BPM | RESPIRATION RATE: 18 BRPM | SYSTOLIC BLOOD PRESSURE: 105 MMHG | SYSTOLIC BLOOD PRESSURE: 97 MMHG | OXYGEN SATURATION: 98 % | HEART RATE: 65 BPM | HEART RATE: 72 BPM | DIASTOLIC BLOOD PRESSURE: 80 MMHG | SYSTOLIC BLOOD PRESSURE: 122 MMHG | SYSTOLIC BLOOD PRESSURE: 155 MMHG | DIASTOLIC BLOOD PRESSURE: 83 MMHG | DIASTOLIC BLOOD PRESSURE: 56 MMHG | TEMPERATURE: 98.2 F | HEIGHT: 72 IN | DIASTOLIC BLOOD PRESSURE: 75 MMHG | HEART RATE: 67 BPM | DIASTOLIC BLOOD PRESSURE: 93 MMHG | DIASTOLIC BLOOD PRESSURE: 63 MMHG

## 2019-10-07 ENCOUNTER — AMBULATORY SURGICAL CENTER (AMBULATORY)
Dept: URBAN - METROPOLITAN AREA SURGERY 17 | Facility: SURGERY | Age: 64
End: 2019-10-07

## 2019-10-07 ENCOUNTER — OFFICE (AMBULATORY)
Dept: URBAN - METROPOLITAN AREA PATHOLOGY 4 | Facility: PATHOLOGY | Age: 64
End: 2019-10-07

## 2019-10-07 DIAGNOSIS — K29.70 GASTRITIS, UNSPECIFIED, WITHOUT BLEEDING: ICD-10-CM

## 2019-10-07 DIAGNOSIS — K21.0 GASTRO-ESOPHAGEAL REFLUX DISEASE WITH ESOPHAGITIS: ICD-10-CM

## 2019-10-07 DIAGNOSIS — K57.30 DIVERTICULOSIS OF LARGE INTESTINE WITHOUT PERFORATION OR ABS: ICD-10-CM

## 2019-10-07 DIAGNOSIS — K21.9 GASTRO-ESOPHAGEAL REFLUX DISEASE WITHOUT ESOPHAGITIS: ICD-10-CM

## 2019-10-07 DIAGNOSIS — K22.70 BARRETT'S ESOPHAGUS WITHOUT DYSPLASIA: ICD-10-CM

## 2019-10-07 DIAGNOSIS — Z86.010 PERSONAL HISTORY OF COLONIC POLYPS: ICD-10-CM

## 2019-10-07 LAB
GI HISTOLOGY: A. SELECT: (no result)
GI HISTOLOGY: B. SELECT: (no result)
GI HISTOLOGY: PDF REPORT: (no result)

## 2019-10-07 PROCEDURE — 43239 EGD BIOPSY SINGLE/MULTIPLE: CPT | Mod: 59 | Performed by: INTERNAL MEDICINE

## 2019-10-07 PROCEDURE — 45378 DIAGNOSTIC COLONOSCOPY: CPT | Mod: PT | Performed by: INTERNAL MEDICINE

## 2019-10-07 PROCEDURE — 88305 TISSUE EXAM BY PATHOLOGIST: CPT | Performed by: INTERNAL MEDICINE

## 2019-11-08 ENCOUNTER — OFFICE VISIT (OUTPATIENT)
Dept: FAMILY MEDICINE CLINIC | Facility: CLINIC | Age: 64
End: 2019-11-08

## 2019-11-08 VITALS
WEIGHT: 198 LBS | RESPIRATION RATE: 16 BRPM | OXYGEN SATURATION: 98 % | HEART RATE: 55 BPM | HEIGHT: 72 IN | TEMPERATURE: 98 F | BODY MASS INDEX: 26.82 KG/M2 | SYSTOLIC BLOOD PRESSURE: 144 MMHG | DIASTOLIC BLOOD PRESSURE: 93 MMHG

## 2019-11-08 DIAGNOSIS — I10 ESSENTIAL HYPERTENSION: Primary | ICD-10-CM

## 2019-11-08 PROCEDURE — 99213 OFFICE O/P EST LOW 20 MIN: CPT | Performed by: FAMILY MEDICINE

## 2019-11-08 NOTE — PROGRESS NOTES
Subjective   Tesfaye Smith is a 64 y.o. male.     64-year-old male presents today for follow-up on hypertension.    Patient is on lisinopril-HCTZ 20-25 mg a day.  Blood pressure at last visit was 153/91.  Patient is advising her blood pressure log and reduce salt in his diet.  Presents today for follow-up.  Blood pressure in the office today 144/93.  Blood pressures at home are 130s/80s. Only goes higher at home with stress. Not interested in meds for stress/anxiety.             The following portions of the patient's history were reviewed and updated as appropriate: allergies, current medications, past family history, past medical history, past social history, past surgical history and problem list.    Review of Systems   Constitutional: Negative for chills and fever.   Respiratory: Negative for cough and shortness of breath.    Cardiovascular: Negative for chest pain, palpitations and leg swelling.   Gastrointestinal: Negative for abdominal pain, nausea and vomiting.       Objective   Physical Exam   Constitutional: He is oriented to person, place, and time. He appears well-developed and well-nourished.   HENT:   Head: Normocephalic and atraumatic.   Eyes: EOM are normal. Pupils are equal, round, and reactive to light.   Neck: Normal range of motion. Neck supple.   Cardiovascular: Normal rate, regular rhythm and normal heart sounds.   No murmur heard.  Pulmonary/Chest: Effort normal and breath sounds normal. No stridor. No respiratory distress. He has no wheezes. He has no rales.   Neurological: He is alert and oriented to person, place, and time.   Skin: Skin is warm.   Psychiatric: He has a normal mood and affect. His behavior is normal.         Lab Results   Component Value Date    COLORU Yellow 09/21/2017    CLARITYU Cloudy (A) 09/21/2017    LEUKOCYTESUR Negative 09/21/2017    BILIRUBINUR Negative 09/21/2017         Assessment/Plan     Diagnoses and all orders for this visit:    Essential  hypertension

## 2020-02-17 DIAGNOSIS — K21.9 GASTROESOPHAGEAL REFLUX DISEASE, ESOPHAGITIS PRESENCE NOT SPECIFIED: ICD-10-CM

## 2020-02-17 RX ORDER — ESOMEPRAZOLE MAGNESIUM 40 MG/1
CAPSULE, DELAYED RELEASE ORAL
Qty: 90 CAPSULE | Refills: 0 | Status: SHIPPED | OUTPATIENT
Start: 2020-02-17 | End: 2020-09-11 | Stop reason: SDUPTHER

## 2020-02-26 ENCOUNTER — TELEPHONE (OUTPATIENT)
Dept: FAMILY MEDICINE CLINIC | Facility: CLINIC | Age: 65
End: 2020-02-26

## 2020-02-26 DIAGNOSIS — R79.9 ABNORMAL BLOOD CHEMISTRY: ICD-10-CM

## 2020-02-26 DIAGNOSIS — I10 ESSENTIAL HYPERTENSION: Primary | ICD-10-CM

## 2020-03-03 LAB
ALBUMIN SERPL-MCNC: 4.5 G/DL (ref 3.5–5.2)
ALBUMIN/GLOB SERPL: 1.8 G/DL
ALP SERPL-CCNC: 72 U/L (ref 39–117)
ALT SERPL-CCNC: 18 U/L (ref 1–41)
AST SERPL-CCNC: 18 U/L (ref 1–40)
BASOPHILS # BLD AUTO: 0.07 10*3/MM3 (ref 0–0.2)
BASOPHILS NFR BLD AUTO: 1 % (ref 0–1.5)
BILIRUB SERPL-MCNC: 0.6 MG/DL (ref 0.2–1.2)
BUN SERPL-MCNC: 18 MG/DL (ref 8–23)
BUN/CREAT SERPL: 16.2 (ref 7–25)
CALCIUM SERPL-MCNC: 9.7 MG/DL (ref 8.6–10.5)
CHLORIDE SERPL-SCNC: 102 MMOL/L (ref 98–107)
CHOLEST SERPL-MCNC: 183 MG/DL (ref 0–200)
CO2 SERPL-SCNC: 28 MMOL/L (ref 22–29)
CREAT SERPL-MCNC: 1.11 MG/DL (ref 0.76–1.27)
EOSINOPHIL # BLD AUTO: 0.11 10*3/MM3 (ref 0–0.4)
EOSINOPHIL NFR BLD AUTO: 1.5 % (ref 0.3–6.2)
ERYTHROCYTE [DISTWIDTH] IN BLOOD BY AUTOMATED COUNT: 12.8 % (ref 12.3–15.4)
GLOBULIN SER CALC-MCNC: 2.5 GM/DL
GLUCOSE SERPL-MCNC: 95 MG/DL (ref 65–99)
HCT VFR BLD AUTO: 44.9 % (ref 37.5–51)
HDLC SERPL-MCNC: 73 MG/DL (ref 40–60)
HGB BLD-MCNC: 15.9 G/DL (ref 13–17.7)
IMM GRANULOCYTES # BLD AUTO: 0.03 10*3/MM3 (ref 0–0.05)
IMM GRANULOCYTES NFR BLD AUTO: 0.4 % (ref 0–0.5)
LDLC SERPL CALC-MCNC: 97 MG/DL (ref 0–100)
LYMPHOCYTES # BLD AUTO: 1.92 10*3/MM3 (ref 0.7–3.1)
LYMPHOCYTES NFR BLD AUTO: 26.3 % (ref 19.6–45.3)
MCH RBC QN AUTO: 31.5 PG (ref 26.6–33)
MCHC RBC AUTO-ENTMCNC: 35.4 G/DL (ref 31.5–35.7)
MCV RBC AUTO: 88.9 FL (ref 79–97)
MONOCYTES # BLD AUTO: 0.7 10*3/MM3 (ref 0.1–0.9)
MONOCYTES NFR BLD AUTO: 9.6 % (ref 5–12)
NEUTROPHILS # BLD AUTO: 4.46 10*3/MM3 (ref 1.7–7)
NEUTROPHILS NFR BLD AUTO: 61.2 % (ref 42.7–76)
NRBC BLD AUTO-RTO: 0 /100 WBC (ref 0–0.2)
PLATELET # BLD AUTO: 216 10*3/MM3 (ref 140–450)
POTASSIUM SERPL-SCNC: 5.1 MMOL/L (ref 3.5–5.2)
PROT SERPL-MCNC: 7 G/DL (ref 6–8.5)
RBC # BLD AUTO: 5.05 10*6/MM3 (ref 4.14–5.8)
SODIUM SERPL-SCNC: 143 MMOL/L (ref 136–145)
TRIGL SERPL-MCNC: 63 MG/DL (ref 0–150)
VLDLC SERPL CALC-MCNC: 12.6 MG/DL
WBC # BLD AUTO: 7.29 10*3/MM3 (ref 3.4–10.8)

## 2020-03-05 DIAGNOSIS — I10 ESSENTIAL HYPERTENSION: ICD-10-CM

## 2020-03-06 RX ORDER — LISINOPRIL AND HYDROCHLOROTHIAZIDE 25; 20 MG/1; MG/1
TABLET ORAL
Qty: 90 TABLET | Refills: 1 | Status: SHIPPED | OUTPATIENT
Start: 2020-03-06 | End: 2020-09-11 | Stop reason: SDUPTHER

## 2020-03-10 ENCOUNTER — OFFICE VISIT (OUTPATIENT)
Dept: FAMILY MEDICINE CLINIC | Facility: CLINIC | Age: 65
End: 2020-03-10

## 2020-03-10 VITALS
RESPIRATION RATE: 16 BRPM | BODY MASS INDEX: 27.09 KG/M2 | TEMPERATURE: 97.8 F | SYSTOLIC BLOOD PRESSURE: 128 MMHG | HEART RATE: 82 BPM | WEIGHT: 200 LBS | DIASTOLIC BLOOD PRESSURE: 90 MMHG | OXYGEN SATURATION: 97 % | HEIGHT: 72 IN

## 2020-03-10 DIAGNOSIS — Z12.5 ENCOUNTER FOR PROSTATE CANCER SCREENING: ICD-10-CM

## 2020-03-10 DIAGNOSIS — I10 ESSENTIAL HYPERTENSION: ICD-10-CM

## 2020-03-10 DIAGNOSIS — R79.9 ABNORMAL BLOOD CHEMISTRY: ICD-10-CM

## 2020-03-10 DIAGNOSIS — Z00.00 MEDICARE ANNUAL WELLNESS VISIT, INITIAL: Primary | ICD-10-CM

## 2020-03-10 PROCEDURE — G0438 PPPS, INITIAL VISIT: HCPCS | Performed by: FAMILY MEDICINE

## 2020-03-10 NOTE — PROGRESS NOTES
The ABCs of the Annual Wellness Visit  Initial Medicare Wellness Visit    Chief Complaint   Patient presents with   • Medicare Wellness-subsequent       Subjective   History of Present Illness:  Tesfaye Smith is a 64 y.o. male who presents for an Initial Medicare Wellness Visit.    Hypertension: Lisinopril-HCTZ 20-25 mg a day.  Blood pressure in the office today 128/90.  Blood pressure ranges at home 118/80. No CP SOB HA with vision changes.    GERD: Nexium 40 mg a day.    Flu vaccine: UTD  Tdap: Up-to-date  PPSV23: Has records that he had this 12/2019  Shingrix: UTD    CMP CBC lipid panel was done 1 week prior to appointment and all labs are essentially normal.    HEALTH RISK ASSESSMENT    Recent Hospitalizations:  No hospitalization(s) within the last year.    Current Medical Providers:  Patient Care Team:  Kristen Hooper MD as PCP - General (Family Medicine)  Kristen Hooper MD as PCP - Claims Attributed    Smoking Status:  Social History     Tobacco Use   Smoking Status Never Smoker   Smokeless Tobacco Never Used       Alcohol Consumption:  Social History     Substance and Sexual Activity   Alcohol Use Yes    Comment: occasional       Depression Screen:   PHQ-2/PHQ-9 Depression Screening 3/10/2020   Little interest or pleasure in doing things 0   Feeling down, depressed, or hopeless 0   Trouble falling or staying asleep, or sleeping too much 1   Feeling tired or having little energy 1   Poor appetite or overeating 0   Feeling bad about yourself - or that you are a failure or have let yourself or your family down 0   Trouble concentrating on things, such as reading the newspaper or watching television 0   Moving or speaking so slowly that other people could have noticed. Or the opposite - being so fidgety or restless that you have been moving around a lot more than usual 0   Thoughts that you would be better off dead, or of hurting yourself in some way 0   Total Score 2   If you checked off any problems, how  difficult have these problems made it for you to do your work, take care of things at home, or get along with other people? Not difficult at all       Fall Risk Screen:  NATY Fall Risk Assessment was completed, and patient is at LOW risk for falls.Assessment completed on:3/10/2020    Health Habits and Functional and Cognitive Screening:  Functional & Cognitive Status 3/10/2020   Do you have difficulty preparing food and eating? No   Do you have difficulty bathing yourself, getting dressed or grooming yourself? No   Do you have difficulty using the toilet? No   Do you have difficulty moving around from place to place? No   Do you have trouble with steps or getting out of a bed or a chair? No   Current Diet Well Balanced Diet   Dental Exam Up to date   Eye Exam Up to date   Exercise (times per week) 5 times per week   Current Exercise Activities Include Walking   Do you need help using the phone?  No   Are you deaf or do you have serious difficulty hearing?  Yes   Do you need help with transportation? No   Do you need help shopping? No   Do you need help preparing meals?  No   Do you need help with housework?  No   Do you need help with laundry? No   Do you need help taking your medications? No   Do you need help managing money? No   Do you ever drive or ride in a car without wearing a seat belt? No   Have you felt unusual stress, anger or loneliness in the last month? Yes   Who do you live with? Spouse   If you need help, do you have trouble finding someone available to you? No   Have you been bothered in the last four weeks by sexual problems? No   Do you have difficulty concentrating, remembering or making decisions? No         Does the patient have evidence of cognitive impairment? No    Asprin use counseling:Taking ASA appropriately as indicated    Age-appropriate Screening Schedule:  Refer to the list below for future screening recommendations based on patient's age, sex and/or medical conditions. Orders for  these recommended tests are listed in the plan section. The patient has been provided with a written plan.    Health Maintenance   Topic Date Due   • ZOSTER VACCINE (3 of 3) 02/24/2020   • COLONOSCOPY  10/07/2024   • TDAP/TD VACCINES (3 - Td) 11/08/2029   • INFLUENZA VACCINE  Completed          The following portions of the patient's history were reviewed and updated as appropriate: allergies, current medications, past family history, past medical history, past social history, past surgical history and problem list.    Outpatient Medications Prior to Visit   Medication Sig Dispense Refill   • aspirin (ASPIR) 81 MG EC tablet Take 81 mg by mouth daily.     • Cetirizine HCl (ZYRTEC ALLERGY) 10 MG capsule Take 1 tablet by mouth As Needed.     • esomeprazole (nexIUM) 40 MG capsule TAKE 1 CAPSULE EVERY MORNING BEFORE BREAKFAST 90 capsule 0   • lisinopril-hydrochlorothiazide (PRINZIDE,ZESTORETIC) 20-25 MG per tablet TAKE 1 TABLET DAILY 90 tablet 1   • Multiple Vitamins-Minerals (CENTRUM SILVER ADULT 50+) tablet Take 1 tablet by mouth Daily.     • meloxicam (MOBIC) 7.5 MG tablet Take one tablet by mouth every other day. 45 tablet 3     No facility-administered medications prior to visit.        Patient Active Problem List   Diagnosis   • Eczema   • Hypertension   • Acquired deformity of right foot   • Hallux valgus   • Hammertoe   • Metatarsalgia of right foot   • Gastroesophageal reflux disease       Advanced Care Planning:  ACP discussion was held with the patient during this visit.    Review of Systems   Constitutional: Negative for fever.   HENT: Negative for nosebleeds and trouble swallowing.    Eyes: Negative for visual disturbance.   Respiratory: Negative for choking and stridor.    Cardiovascular: Negative for chest pain.   Gastrointestinal: Negative for blood in stool.   Endocrine: Negative for polydipsia.   Genitourinary: Negative for genital sores and hematuria.   Musculoskeletal: Negative for joint swelling.  "  Skin: Negative for color change and rash.   Allergic/Immunologic: Negative for immunocompromised state.   Neurological: Negative for seizures, facial asymmetry and speech difficulty.   Hematological: Negative for adenopathy.   Psychiatric/Behavioral: Negative for behavioral problems, self-injury and suicidal ideas.       Compared to one year ago, the patient feels his physical health is the same.  Compared to one year ago, the patient feels his mental health is the same.    Reviewed chart for potential of high risk medication in the elderly: yes  Reviewed chart for potential of harmful drug interactions in the elderly:yes    Objective         Vitals:    03/10/20 1011   BP: 128/90   Pulse: 82   Resp: 16   Temp: 97.8 °F (36.6 °C)   TempSrc: Oral   SpO2: 97%   Weight: 90.7 kg (200 lb)   Height: 182.9 cm (72\")   PainSc: 0-No pain       Body mass index is 27.12 kg/m².  Discussed the patient's BMI with him. The BMI is above average; BMI management plan is completed.    Physical Exam   Constitutional: He is oriented to person, place, and time. He appears well-developed and well-nourished.   HENT:   Head: Normocephalic and atraumatic.   Right Ear: External ear normal.   Left Ear: External ear normal.   Nose: Nose normal.   Mouth/Throat: Oropharynx is clear and moist. No oropharyngeal exudate.   Eyes: Pupils are equal, round, and reactive to light. Conjunctivae and EOM are normal.   Neck: Normal range of motion. Neck supple.   Cardiovascular: Normal rate and regular rhythm.   No murmur heard.  Pulmonary/Chest: Effort normal and breath sounds normal. No stridor. No respiratory distress. He has no wheezes. He has no rales. He exhibits no tenderness.   Abdominal: Soft. Bowel sounds are normal. He exhibits no distension and no mass. There is no tenderness. There is no guarding.   Neurological: He is alert and oriented to person, place, and time.   Skin: Skin is warm.   Psychiatric: He has a normal mood and affect. His behavior " is normal.       Lab Results   Component Value Date    GLU 95 03/02/2020    CHLPL 183 03/02/2020    TRIG 63 03/02/2020    HDL 73 (H) 03/02/2020    LDL 97 03/02/2020    VLDL 12.6 03/02/2020        Assessment/Plan   Medicare Risks and Personalized Health Plan  CMS Preventative Services Quick Reference  Advance Directive Discussion  Cardiovascular risk  Diabetic Lab Screening   Immunizations Discussed/Encouraged (specific immunizations; Influenza, Pneumococcal 23 and Shingrix )  Obesity/Overweight   Polypharmacy  Prostate Cancer Screening     The above risks/problems have been discussed with the patient.  Pertinent information has been shared with the patient in the After Visit Summary.  Follow up plans and orders are seen below in the Assessment/Plan Section.    Diagnoses and all orders for this visit:    1. Medicare annual wellness visit, initial (Primary)    2. Encounter for prostate cancer screening  -     PSA SCREENING    3. Essential hypertension  -     Basic Metabolic Panel    4. Abnormal blood chemistry  -     Basic Metabolic Panel      Follow Up:  Return in about 6 months (around 9/10/2020).     An After Visit Summary and PPPS were given to the patient.     Follow-up BMP has potassium is getting higher compared to September.  He is on lisinopril-HCTZ 20-25 mg a day.

## 2020-07-13 DIAGNOSIS — K21.9 GASTROESOPHAGEAL REFLUX DISEASE, ESOPHAGITIS PRESENCE NOT SPECIFIED: ICD-10-CM

## 2020-07-14 RX ORDER — ESOMEPRAZOLE MAGNESIUM 40 MG/1
CAPSULE, DELAYED RELEASE ORAL
Qty: 90 CAPSULE | Refills: 11 | OUTPATIENT
Start: 2020-07-14

## 2020-09-03 DIAGNOSIS — I10 ESSENTIAL HYPERTENSION: ICD-10-CM

## 2020-09-03 RX ORDER — LISINOPRIL AND HYDROCHLOROTHIAZIDE 25; 20 MG/1; MG/1
TABLET ORAL
Qty: 30 TABLET | Refills: 0 | OUTPATIENT
Start: 2020-09-03

## 2020-09-11 ENCOUNTER — OFFICE VISIT (OUTPATIENT)
Dept: FAMILY MEDICINE CLINIC | Facility: CLINIC | Age: 65
End: 2020-09-11

## 2020-09-11 VITALS
HEART RATE: 85 BPM | HEIGHT: 72 IN | OXYGEN SATURATION: 96 % | SYSTOLIC BLOOD PRESSURE: 110 MMHG | DIASTOLIC BLOOD PRESSURE: 78 MMHG | RESPIRATION RATE: 16 BRPM | WEIGHT: 189 LBS | TEMPERATURE: 97.1 F | BODY MASS INDEX: 25.6 KG/M2

## 2020-09-11 DIAGNOSIS — K21.9 GASTROESOPHAGEAL REFLUX DISEASE, ESOPHAGITIS PRESENCE NOT SPECIFIED: ICD-10-CM

## 2020-09-11 DIAGNOSIS — Z12.5 SCREENING FOR PROSTATE CANCER: ICD-10-CM

## 2020-09-11 DIAGNOSIS — I10 ESSENTIAL HYPERTENSION: Primary | ICD-10-CM

## 2020-09-11 DIAGNOSIS — Z12.83 SKIN CANCER SCREENING: ICD-10-CM

## 2020-09-11 DIAGNOSIS — R63.4 WEIGHT LOSS, UNINTENTIONAL: ICD-10-CM

## 2020-09-11 PROCEDURE — 99213 OFFICE O/P EST LOW 20 MIN: CPT | Performed by: FAMILY MEDICINE

## 2020-09-11 RX ORDER — ESOMEPRAZOLE MAGNESIUM 40 MG/1
40 CAPSULE, DELAYED RELEASE ORAL
Qty: 90 CAPSULE | Refills: 1 | Status: SHIPPED | OUTPATIENT
Start: 2020-09-11 | End: 2021-04-06 | Stop reason: SDUPTHER

## 2020-09-11 RX ORDER — LISINOPRIL AND HYDROCHLOROTHIAZIDE 25; 20 MG/1; MG/1
1 TABLET ORAL DAILY
Qty: 90 TABLET | Refills: 1 | Status: SHIPPED | OUTPATIENT
Start: 2020-09-11 | End: 2020-11-16 | Stop reason: SDUPTHER

## 2020-09-11 NOTE — PROGRESS NOTES
Subjective   Tesfyae Smith is a 65 y.o. male.     History of Present Illness     65-year-old male presents today for 6-month follow-up on hypertension and GERD.    Hypertension: Lisinopril-HCTZ 20-25 mg a day.  Blood pressure in the office today 110/78.  Blood pressure ranges at home same.  Denies chest pain shortness of breath.    GERD: Nexium 40 mg a day.  Continues to control his symptoms well.    Last visit PSA and follow-up BMP were ordered however patient did not get these done.    Lost 11 pounds in the past 6 months.  Did not try to.  But he was eating more during pandemic.  However upon further questioning has a very active rides his bike works on his yard walks outside.  No change in appetite.  Eats regularly.  Pants fit tighter.    The following portions of the patient's history were reviewed and updated as appropriate: allergies, current medications, past family history, past medical history, past social history, past surgical history and problem list.    Review of Systems   Constitutional: Positive for unexpected weight loss. Negative for chills and fever.   Respiratory: Negative for cough and shortness of breath.    Cardiovascular: Negative for chest pain, palpitations and leg swelling.   Gastrointestinal: Negative for abdominal pain, nausea and vomiting.       Objective   Physical Exam   Constitutional: He is oriented to person, place, and time. He appears well-developed and well-nourished.   HENT:   Head: Normocephalic and atraumatic.   Eyes: Pupils are equal, round, and reactive to light. EOM are normal.   Neck: Normal range of motion. Neck supple.   Cardiovascular: Normal rate, regular rhythm and normal heart sounds.   No murmur heard.  Pulmonary/Chest: Effort normal and breath sounds normal. No stridor. No respiratory distress. He has no wheezes. He has no rales.   Neurological: He is alert and oriented to person, place, and time.   Skin: Skin is warm.   Psychiatric: He has a normal mood and  affect. His behavior is normal.               Assessment/Plan     Tesfaye was seen today for hypertension.    Diagnoses and all orders for this visit:    Essential hypertension  -     lisinopril-hydrochlorothiazide (PRINZIDE,ZESTORETIC) 20-25 MG per tablet; Take 1 tablet by mouth Daily.  -     TSH Rfx On Abnormal To Free T4    Gastroesophageal reflux disease, esophagitis presence not specified  -     esomeprazole (nexIUM) 40 MG capsule; Take 1 capsule by mouth Every Morning Before Breakfast.    Skin cancer screening  -     Ambulatory Referral to Dermatology    Weight loss, unintentional  -     CBC & Differential  -     Comprehensive Metabolic Panel  -     TSH Rfx On Abnormal To Free T4    Screening for prostate cancer  -     PSA SCREENING

## 2020-09-18 LAB
ALBUMIN SERPL-MCNC: 3.8 G/DL (ref 3.8–4.8)
ALBUMIN/GLOB SERPL: 1.3 {RATIO} (ref 1.2–2.2)
ALP SERPL-CCNC: 106 IU/L (ref 39–117)
ALT SERPL-CCNC: 13 IU/L (ref 0–44)
AST SERPL-CCNC: 15 IU/L (ref 0–40)
BASOPHILS # BLD AUTO: 0.1 X10E3/UL (ref 0–0.2)
BASOPHILS NFR BLD AUTO: 0 %
BILIRUB SERPL-MCNC: 0.4 MG/DL (ref 0–1.2)
BUN SERPL-MCNC: 19 MG/DL (ref 8–27)
BUN/CREAT SERPL: 17 (ref 10–24)
CALCIUM SERPL-MCNC: 9.2 MG/DL (ref 8.6–10.2)
CHLORIDE SERPL-SCNC: 97 MMOL/L (ref 96–106)
CO2 SERPL-SCNC: 28 MMOL/L (ref 20–29)
CREAT SERPL-MCNC: 1.12 MG/DL (ref 0.76–1.27)
EOSINOPHIL # BLD AUTO: 0.1 X10E3/UL (ref 0–0.4)
EOSINOPHIL NFR BLD AUTO: 0 %
ERYTHROCYTE [DISTWIDTH] IN BLOOD BY AUTOMATED COUNT: 12.3 % (ref 11.6–15.4)
GLOBULIN SER CALC-MCNC: 2.9 G/DL (ref 1.5–4.5)
GLUCOSE SERPL-MCNC: 91 MG/DL (ref 65–99)
HCT VFR BLD AUTO: 40.9 % (ref 37.5–51)
HGB BLD-MCNC: 13.5 G/DL (ref 13–17.7)
IMM GRANULOCYTES # BLD AUTO: 0 X10E3/UL (ref 0–0.1)
IMM GRANULOCYTES NFR BLD AUTO: 0 %
LYMPHOCYTES # BLD AUTO: 1.3 X10E3/UL (ref 0.7–3.1)
LYMPHOCYTES NFR BLD AUTO: 9 %
MCH RBC QN AUTO: 30.4 PG (ref 26.6–33)
MCHC RBC AUTO-ENTMCNC: 33 G/DL (ref 31.5–35.7)
MCV RBC AUTO: 92 FL (ref 79–97)
MONOCYTES # BLD AUTO: 1.4 X10E3/UL (ref 0.1–0.9)
MONOCYTES NFR BLD AUTO: 10 %
NEUTROPHILS # BLD AUTO: 10.6 X10E3/UL (ref 1.4–7)
NEUTROPHILS NFR BLD AUTO: 81 %
PLATELET # BLD AUTO: 367 X10E3/UL (ref 150–450)
POTASSIUM SERPL-SCNC: 4.2 MMOL/L (ref 3.5–5.2)
PROT SERPL-MCNC: 6.7 G/DL (ref 6–8.5)
PSA SERPL-MCNC: 0.9 NG/ML (ref 0–4)
RBC # BLD AUTO: 4.44 X10E6/UL (ref 4.14–5.8)
SODIUM SERPL-SCNC: 140 MMOL/L (ref 134–144)
T4 FREE SERPL-MCNC: 1.28 NG/DL (ref 0.82–1.77)
TSH SERPL DL<=0.005 MIU/L-ACNC: 0.33 UIU/ML (ref 0.45–4.5)
WBC # BLD AUTO: 13.4 X10E3/UL (ref 3.4–10.8)

## 2020-09-21 ENCOUNTER — TELEPHONE (OUTPATIENT)
Dept: FAMILY MEDICINE CLINIC | Facility: CLINIC | Age: 65
End: 2020-09-21

## 2020-09-21 DIAGNOSIS — D72.829 LEUKOCYTOSIS, UNSPECIFIED TYPE: Primary | ICD-10-CM

## 2020-09-21 NOTE — TELEPHONE ENCOUNTER
Pt is calling about labs. He seen on my chart that his WBC is elevated. He is concern that he might have an infection.  Pt is having nausea with aches and pains. Please advice.

## 2020-09-23 NOTE — TELEPHONE ENCOUNTER
You were correct to advise him to go to the ER or at the very least to urgent care.  White blood cell count was very slightly elevated.  This can be transient.  I recommend recheck of CBC today.  Advised him there are several types of antibiotics and they are all prescribed for specific reasons for specific conditions none of which can be figured out over the phone.  If he is still having nausea and pain he should go to urgent care or ER.  Again recommend CBC today.

## 2020-09-23 NOTE — TELEPHONE ENCOUNTER
I talked with wife yesterday. She states that pt was having pain. I advise them if he was having that much pain that he would need to go to the ER. Patient stated that he taken an antibiotic the day before was feeling a little better and just wanted a antibiotic. I explain that he has to be seen for that. Him and his wife got upset by that.

## 2020-09-24 LAB
BASOPHILS # BLD AUTO: 0.1 X10E3/UL (ref 0–0.2)
BASOPHILS NFR BLD AUTO: 1 %
EOSINOPHIL # BLD AUTO: 0.1 X10E3/UL (ref 0–0.4)
EOSINOPHIL NFR BLD AUTO: 1 %
ERYTHROCYTE [DISTWIDTH] IN BLOOD BY AUTOMATED COUNT: 12 % (ref 11.6–15.4)
HCT VFR BLD AUTO: 40.3 % (ref 37.5–51)
HGB BLD-MCNC: 13.9 G/DL (ref 13–17.7)
IMM GRANULOCYTES # BLD AUTO: 0.1 X10E3/UL (ref 0–0.1)
IMM GRANULOCYTES NFR BLD AUTO: 1 %
LYMPHOCYTES # BLD AUTO: 1.9 X10E3/UL (ref 0.7–3.1)
LYMPHOCYTES NFR BLD AUTO: 13 %
MCH RBC QN AUTO: 30.5 PG (ref 26.6–33)
MCHC RBC AUTO-ENTMCNC: 34.5 G/DL (ref 31.5–35.7)
MCV RBC AUTO: 88 FL (ref 79–97)
MONOCYTES # BLD AUTO: 1 X10E3/UL (ref 0.1–0.9)
MONOCYTES NFR BLD AUTO: 7 %
NEUTROPHILS # BLD AUTO: 11.5 X10E3/UL (ref 1.4–7)
NEUTROPHILS NFR BLD AUTO: 77 %
PLATELET # BLD AUTO: 562 X10E3/UL (ref 150–450)
RBC # BLD AUTO: 4.56 X10E6/UL (ref 4.14–5.8)
WBC # BLD AUTO: 14.6 X10E3/UL (ref 3.4–10.8)

## 2020-09-25 NOTE — PROGRESS NOTES
All labs essentially normal except for mild elevation in white blood cell count as well as mild elevation in TSH with normal T4.  Patient was strongly advised to call the office complaining of fatigue and body aches to go to urgent care or ER.  Patient refused.  Advised recheck a CBC which shows elevation of white blood cell count around 14 and mild elevation in platelets in the 500s.    See office note from 9/25/2020 for further details.

## 2020-09-25 NOTE — PROGRESS NOTES
Patient schedule an office visit when he was a 6-month follow-up visit.  Never scheduled that appointment scheduled.  When he came to the office and was being triaged he reported symptoms of fatigue body aches nausea and diarrhea.  Discussed with patient that he cannot be seen in the office because we are not seeing sick visits especially with symptoms concerning for COVID-19 in this office.  Advised patient to go to urgent care or ER to be tested and further evaluated.  Also advised patient that because he lied about why he was scheduling this visit but is reason to consider termination.  Patient claims he was never told to go to urgent care or ER over the past few days.  Patient claims he was told needs to schedule an office visit; I clarified this likely meant a video visit at most.  Patient denies this.    Strongly advised patient to go to urgent care to be tested for COVID-19 and further assessed for the ER.

## 2020-09-30 ENCOUNTER — HOSPITAL ENCOUNTER (OUTPATIENT)
Dept: GENERAL RADIOLOGY | Facility: HOSPITAL | Age: 65
Discharge: HOME | End: 2020-09-30
Admitting: INTERNAL MEDICINE

## 2020-09-30 ENCOUNTER — OFFICE VISIT (OUTPATIENT)
Dept: INTERNAL MEDICINE | Age: 65
End: 2020-09-30

## 2020-09-30 VITALS
TEMPERATURE: 95 F | SYSTOLIC BLOOD PRESSURE: 164 MMHG | HEIGHT: 71 IN | BODY MASS INDEX: 25.84 KG/M2 | RESPIRATION RATE: 20 BRPM | OXYGEN SATURATION: 97 % | HEART RATE: 110 BPM | DIASTOLIC BLOOD PRESSURE: 84 MMHG | WEIGHT: 184.6 LBS

## 2020-09-30 DIAGNOSIS — K21.9 GASTROESOPHAGEAL REFLUX DISEASE, ESOPHAGITIS PRESENCE NOT SPECIFIED: Chronic | ICD-10-CM

## 2020-09-30 DIAGNOSIS — Z23 NEED FOR INFLUENZA VACCINATION: ICD-10-CM

## 2020-09-30 DIAGNOSIS — M25.552 BILATERAL HIP PAIN: ICD-10-CM

## 2020-09-30 DIAGNOSIS — R79.89 ABNORMAL CBC: ICD-10-CM

## 2020-09-30 DIAGNOSIS — I49.9 IRREGULAR HEART RATE: ICD-10-CM

## 2020-09-30 DIAGNOSIS — M25.551 BILATERAL HIP PAIN: ICD-10-CM

## 2020-09-30 DIAGNOSIS — I10 ACCELERATED HYPERTENSION: Primary | ICD-10-CM

## 2020-09-30 DIAGNOSIS — I10 ESSENTIAL HYPERTENSION: Chronic | ICD-10-CM

## 2020-09-30 PROCEDURE — G0008 ADMIN INFLUENZA VIRUS VAC: HCPCS | Performed by: INTERNAL MEDICINE

## 2020-09-30 PROCEDURE — 93000 ELECTROCARDIOGRAM COMPLETE: CPT | Performed by: INTERNAL MEDICINE

## 2020-09-30 PROCEDURE — 90694 VACC AIIV4 NO PRSRV 0.5ML IM: CPT | Performed by: INTERNAL MEDICINE

## 2020-09-30 PROCEDURE — 73521 X-RAY EXAM HIPS BI 2 VIEWS: CPT

## 2020-09-30 PROCEDURE — 99215 OFFICE O/P EST HI 40 MIN: CPT | Performed by: INTERNAL MEDICINE

## 2020-09-30 RX ORDER — AMLODIPINE BESYLATE 5 MG/1
5 TABLET ORAL DAILY
Qty: 30 TABLET | Refills: 3 | Status: SHIPPED | OUTPATIENT
Start: 2020-09-30 | End: 2020-11-16 | Stop reason: SDUPTHER

## 2020-09-30 RX ORDER — ACETAMINOPHEN 500 MG
500 TABLET ORAL EVERY 6 HOURS PRN
COMMUNITY
Start: 2020-09-30 | End: 2022-05-10

## 2020-09-30 NOTE — ASSESSMENT & PLAN NOTE
Lab Results   Component Value Date    WBC 14.6 (H) 09/23/2020    HGB 13.9 09/23/2020     (H) 09/23/2020      Recheck complete blood count on follow-up in 1 month.

## 2020-09-30 NOTE — ASSESSMENT & PLAN NOTE
Blood pressure is notably higher recently.   Start amlodipine 5 mg qHS.   Continue lisinopril-HCTZ 20/25 mg qd.   Discontinue use of meloxicam or any over-the-counter NSAIDS.   Maintain low sodium diet of less than 3 gm.    Manage emotions better.   Send blood pressure readings in 1 week.   Follow-up 1 month.     BP Readings from Last 3 Encounters:   09/30/20 164/84   09/25/20 155/84   09/25/20 153/100

## 2020-09-30 NOTE — ASSESSMENT & PLAN NOTE
Check xray of bilateral hips.   Take Tylenol ES q6 PRN for pain.  Avoid meloxicam/nsaids due to accelerated hypertension for now.   Can reconsider later once blood pressure controlled.

## 2020-09-30 NOTE — PATIENT INSTRUCTIONS
** IMPORTANT MESSAGE FROM DR. RODRIGUEZ **    In our office, your satisfaction is VERY important to us.     You may receive a survey from [employer] by mail or E-mail for you to provide feedback about your visit. This information is invaluable for me to know what we can do to improve our services.     I ask that you please take a few minutes to complete the survey and let us know how we are doing in serving your needs. (You may receive the survey more than once for multiple visits)    Thank You !    Dr. Rodriguez & Staff    _________________________________________________________________________________________________________________________      ** ADDITIONAL INSTRUCTION / REMINDERS FROM DR. RODRIGUEZ **

## 2020-09-30 NOTE — PROGRESS NOTES
[facility] [place] INTERNAL MEDICINE  SULTANA RODRIGUEZ M.D.      Tesfaye VARGHESE Smith / 65 y.o. / male  09/30/2020    CHIEF COMPLAINT     Establish Care (EST CARE ABN LABS C/O WT LOSS FATIGUE PROSTATE ISSUES LOW THYROID LABS HX HTN REFLUX), Hip Pain, Hypertension, and Heartburn      ASSESSMENT & PLAN     Problem List Items Addressed This Visit        High    Accelerated hypertension - Primary (Chronic)    Current Assessment & Plan     Blood pressure is notably higher recently.   Start amlodipine 5 mg qHS.   Continue lisinopril-HCTZ 20/25 mg qd.   Discontinue use of meloxicam or any over-the-counter NSAIDS.   Maintain low sodium diet of less than 3 gm.    Manage emotions better.   Send blood pressure readings in 1 week.   Follow-up 1 month.     BP Readings from Last 3 Encounters:   09/30/20 164/84   09/25/20 155/84   09/25/20 153/100             Relevant Medications    lisinopril-hydrochlorothiazide (PRINZIDE,ZESTORETIC) 20-25 MG per tablet    amLODIPine (NORVASC) 5 MG tablet       Medium    Bilateral hip pain (Chronic)    Current Assessment & Plan     Check xray of bilateral hips.   Take Tylenol ES q6 PRN for pain.  Avoid meloxicam/nsaids due to accelerated hypertension for now.   Can reconsider later once blood pressure controlled.          Relevant Orders    XR Hip With or Without Pelvis 2 - 3 View Right       Low    Gastroesophageal reflux disease (Chronic)    Current Assessment & Plan     Continue esomeprazole 40 mg qd for now.   Avoid NSAIDS.          Relevant Medications    esomeprazole (nexIUM) 40 MG capsule    Abnormal CBC    Current Assessment & Plan     Lab Results   Component Value Date    WBC 14.6 (H) 09/23/2020    HGB 13.9 09/23/2020     (H) 09/23/2020      Recheck complete blood count on follow-up in 1 month.            Other Visit Diagnoses     Need for influenza vaccination        Relevant Orders    Fluad Quad 65+ yrs ([id number]) (Completed)    Irregular heart rate        EKG today is normal.     Relevant Orders    ECG  "12 Lead        Orders Placed This Encounter   Procedures   • XR Hip With or Without Pelvis 2 - 3 View Right   • Fluad Quad 65+ yrs ([id number])   • ECG 12 Lead     New Medications Ordered This Visit   Medications   • acetaminophen (TYLENOL) 500 MG tablet     Sig: Take 1 tablet by mouth Every 6 (Six) Hours As Needed for Mild Pain  or Moderate Pain .   • amLODIPine (NORVASC) 5 MG tablet     Sig: Take 1 tablet by mouth Daily.     Dispense:  30 tablet     Refill:  3       Summary/Discussion:  Spent 50 minutes in face-to-face encounter time and >50% of time spent in counseling regarding above medical problems/issues.      Next Appointment with me: Visit date not found    Return in about 1 month (around 10/30/2020) for Reassess today's problem(s).      VITALS     Visit Vitals  /84 (BP Location: Right arm, Patient Position: Sitting)   Pulse 110   Temp 95 °F (35 °C) (Temporal)   Resp 20   Ht 179.1 cm (70.5\")   Wt 83.7 kg (184 lb 9.6 oz)   SpO2 97%   BMI 26.11 kg/m²       BP Readings from Last 3 Encounters:   09/30/20 164/84   09/25/20 155/84   09/25/20 153/100     Wt Readings from Last 3 Encounters:   09/30/20 83.7 kg (184 lb 9.6 oz)   09/25/20 83.9 kg (185 lb)   09/25/20 83.9 kg (185 lb)      Body mass index is 26.11 kg/m².    HISTORY OF PRESENT ILLNESS     Patient is a 65 y.o. male who is here to establish care.  His previous primary care physician was Dr. Hooper.  He was recently evaluated at the urgent care center for possible COVID-19 symptoms of fatigue, myalgia, elevated WBC but tested negative.  He states he had COVID-19 antibody testing done on September 17 which was reportedly negative.  He has history of chronic essential hypertension has been taking meloxicam intermittently for severe bilateral hip pain (left greater than right) and his blood pressure appears to be accelerated.  Denies headaches, vision change or chest pain symptoms.  He is on lisinopril/hydrochlorothiazide 20/25 mg daily.  He takes Nexium 40 " mg daily for GERD denies abdominal pain, melena or blood in stool.  Although he has had chronic hip pain he has never had x-rays performed.      Patient Care Team:  Chris Dillon MD as PCP - General (Internal Medicine)  Kristen Hooper MD as PCP - Claims Attributed      REVIEW OF SYSTEMS     Review of Systems   Constitutional: Negative.  Negative for appetite change, chills, diaphoresis and fever. Unexpected weight change: mild wt fluctuation.   HENT: Negative.    Eyes: Negative.    Respiratory: Negative.  Negative for cough and shortness of breath.    Cardiovascular: Negative.  Negative for chest pain and palpitations.   Gastrointestinal: Negative.  Negative for abdominal pain and blood in stool.   Endocrine: Negative.    Genitourinary: Negative.  Negative for difficulty urinating and hematuria.   Musculoskeletal: Negative.  Arthralgias: L>R hip pain.   Skin: Negative.    Allergic/Immunologic: Negative.    Neurological: Negative.    Hematological: Negative.    Psychiatric/Behavioral: Positive for agitation.       PHYSICAL EXAMINATION     Physical Exam  Constitutional:       General: He is not in acute distress.     Appearance: He is not ill-appearing.   HENT:      Head: Normocephalic.      Mouth/Throat:      Mouth: No oral lesions.   Eyes:      General: No scleral icterus.     Conjunctiva/sclera: Conjunctivae normal.   Neck:      Musculoskeletal: Neck supple.      Thyroid: No thyromegaly.      Trachea: No tracheal deviation.   Cardiovascular:      Rate and Rhythm: Normal rate. Rhythm irregular.      Pulses: Normal pulses.      Heart sounds: Normal heart sounds. No murmur. No friction rub. No gallop.    Pulmonary:      Effort: Pulmonary effort is normal.      Breath sounds: Normal breath sounds. No wheezing or rales.   Abdominal:      General: Bowel sounds are normal. There is no distension.      Palpations: Abdomen is soft. There is no mass.      Tenderness: There is no abdominal tenderness.      Hernia: No hernia is  present.   Musculoskeletal:         General: No deformity.      Right hip: He exhibits decreased range of motion.      Left hip: Normal.      Lumbar back: He exhibits no tenderness, no deformity and no spasm.   Lymphadenopathy:      Cervical: No cervical adenopathy.      Upper Body:      Right upper body: No supraclavicular adenopathy.      Left upper body: No supraclavicular adenopathy.   Skin:     Coloration: Skin is not pale.      Findings: No erythema or rash.      Comments: No jaundice   Neurological:      Mental Status: He is alert and oriented to person, place, and time.      Deep Tendon Reflexes: Reflexes are normal and symmetric.   Psychiatric:         Behavior: Behavior normal.         Thought Content: Thought content normal.         Cognition and Memory: Cognition and memory normal.         Judgment: Judgment normal.      Comments: Mildly agitated mood (not at this provider)            DATA REVIEWED     Labs:   Lab Results   Component Value Date     09/17/2020    K 4.2 09/17/2020    CALCIUM 9.2 09/17/2020    AST 15 09/17/2020    ALT 13 09/17/2020    BUN 19 09/17/2020    CREATININE 1.12 09/17/2020    CREATININE 1.11 03/02/2020    CREATININE 1.07 09/06/2019    EGFRIFNONA 68 09/17/2020    EGFRIFAFRI 78 09/17/2020       Lab Results   Component Value Date    GLU 91 09/17/2020    GLU 95 03/02/2020    GLU 86 09/06/2019    HGBA1C 5.1 09/21/2017    HGBA1C 4.95 07/27/2016       Lab Results   Component Value Date    LDL 97 03/02/2020    LDL 81 09/06/2019     (H) 01/22/2019    HDL 73 (H) 03/02/2020    TRIG 63 03/02/2020       Lab Results   Component Value Date    TSH 0.329 (L) 09/17/2020    FREET4 1.28 09/17/2020          Lab Results   Component Value Date    WBC 14.6 (H) 09/23/2020    HGB 13.9 09/23/2020    HGB 13.5 09/17/2020    HGB 15.9 03/02/2020     (H) 09/23/2020         Imaging:        Medical Tests:        ECG 12 Lead    Date/Time: 9/30/2020 12:43 PM  Performed by: Chris Dillon,  MD  Authorized by: Chris Dillon MD   Comparison: not compared with previous ECG   Previous ECG: no previous ECG available  Rhythm: sinus rhythm  Rate: normal  Conduction: conduction normal  QRS axis: normal    Clinical impression: normal ECG          Summary of old records / correspondence / consultant report:        Request outside records:        ______________________________________________________________________    ALLERGIES  No Known Allergies     MEDICATIONS  Current Outpatient Medications   Medication Sig Dispense Refill   • aspirin (ASPIR) 81 MG EC tablet Take 81 mg by mouth daily.     • Cetirizine HCl (ZYRTEC ALLERGY) 10 MG capsule Take 1 tablet by mouth As Needed.     • esomeprazole (nexIUM) 40 MG capsule Take 1 capsule by mouth Every Morning Before Breakfast. 90 capsule 1   • lisinopril-hydrochlorothiazide (PRINZIDE,ZESTORETIC) 20-25 MG per tablet Take 1 tablet by mouth Daily. 90 tablet 1   • Multiple Vitamins-Minerals (CENTRUM SILVER ADULT 50+) tablet Take 1 tablet by mouth Daily.       * was taking meloxicam PRN    PFSH:     The following portions of the patient's history were reviewed and updated as appropriate: Allergies / Current Medications / Past Medical History / Surgical History / Social History / Family History    PROBLEM LIST   Patient Active Problem List   Diagnosis   • Eczema   • Accelerated hypertension   • Acquired deformity of right foot   • Hallux valgus   • Metatarsalgia of right foot   • Gastroesophageal reflux disease   • Bilateral hip pain   • Abnormal CBC       PAST MEDICAL HISTORY  Past Medical History:   Diagnosis Date   • Arthritis    • Almaraz's esophagus    • Bunion of right foot    • Dislocation, metatarsophalangeal 9/7/2016   • Eczema    • Esophageal reflux    • Hypertension    • PONV (postoperative nausea and vomiting)        SURGICAL HISTORY  Past Surgical History:   Procedure Laterality Date   • BUNIONECTOMY Right     RIGHT GREAT TOE   • FOOT FUSION Right 1/24/2017     Procedure: ARTHRODESIS first METATARSALPHALANGEAL JOINT with second metatarsophalageal joint release and second metatarsal osteotomy or resection and second hammertoe correction with proximal phalangeal joint resection of right foot;  Surgeon: Jagdeep Medrano MD;  Location: [facility] [place];  Service:    • HERNIA REPAIR         SOCIAL HISTORY  Social History     Socioeconomic History   • Marital status:      Spouse name: Yajaira   • Number of children: 2   • Years of education: Not on file   • Highest education level: Not on file   Occupational History   • Occupation: Retired ( at [employer])   Tobacco Use   • Smoking status: Never Smoker   • Smokeless tobacco: Never Used   Substance and Sexual Activity   • Alcohol use: Yes     Comment: occasional   • Drug use: No   • Sexual activity: Defer       FAMILY HISTORY  Family History   Problem Relation Age of Onset   • Ovarian cancer Mother    • Rheum arthritis Mother    • Colon cancer Father    • Hypertension Father    • Stroke Father    • Hypertension Sister          Examiner was wearing KN95 mask, face shield and exam gloves during the entire duration of the visit. Patient was masked the entire time.   Minimum social distance of 6 ft maintained entire visit except if physical contact was necessary as documented.     **Dragon Disclaimer:   Much of this encounter note is an electronic transcription/translation of spoken language to printed text. The electronic translation of spoken language may permit erroneous, or at times, nonsensical words or phrases to be inadvertently transcribed. Although I have reviewed the note for such errors, some may still exist.       Template created by Amaya Dillon MD

## 2020-11-16 ENCOUNTER — OFFICE VISIT (OUTPATIENT)
Dept: FAMILY MEDICINE CLINIC | Facility: CLINIC | Age: 65
End: 2020-11-16

## 2020-11-16 VITALS
SYSTOLIC BLOOD PRESSURE: 124 MMHG | TEMPERATURE: 98 F | WEIGHT: 189.2 LBS | OXYGEN SATURATION: 99 % | DIASTOLIC BLOOD PRESSURE: 78 MMHG | HEIGHT: 71 IN | BODY MASS INDEX: 26.49 KG/M2 | HEART RATE: 77 BPM

## 2020-11-16 DIAGNOSIS — Z79.899 ENCOUNTER FOR LONG-TERM (CURRENT) USE OF MEDICATIONS: ICD-10-CM

## 2020-11-16 DIAGNOSIS — I10 ESSENTIAL HYPERTENSION: Primary | ICD-10-CM

## 2020-11-16 DIAGNOSIS — I10 ACCELERATED HYPERTENSION: ICD-10-CM

## 2020-11-16 DIAGNOSIS — K22.719 BARRETT'S ESOPHAGUS WITH DYSPLASIA: ICD-10-CM

## 2020-11-16 DIAGNOSIS — M19.90 ARTHRITIS: ICD-10-CM

## 2020-11-16 DIAGNOSIS — R79.89 ABNORMAL TSH: ICD-10-CM

## 2020-11-16 DIAGNOSIS — D72.829 LEUKOCYTOSIS, UNSPECIFIED TYPE: ICD-10-CM

## 2020-11-16 DIAGNOSIS — K21.9 GASTROESOPHAGEAL REFLUX DISEASE, UNSPECIFIED WHETHER ESOPHAGITIS PRESENT: Chronic | ICD-10-CM

## 2020-11-16 DIAGNOSIS — E78.2 MIXED HYPERLIPIDEMIA: ICD-10-CM

## 2020-11-16 PROCEDURE — 99214 OFFICE O/P EST MOD 30 MIN: CPT | Performed by: FAMILY MEDICINE

## 2020-11-16 RX ORDER — MELOXICAM 7.5 MG/1
15 TABLET ORAL DAILY
Qty: 30 TABLET | Refills: 0 | Status: SHIPPED | OUTPATIENT
Start: 2020-11-16 | End: 2020-11-23 | Stop reason: SDUPTHER

## 2020-11-16 RX ORDER — AMLODIPINE BESYLATE 5 MG/1
5 TABLET ORAL DAILY
Qty: 90 TABLET | Refills: 0 | Status: SHIPPED | OUTPATIENT
Start: 2020-11-16 | End: 2021-03-08

## 2020-11-16 RX ORDER — ATORVASTATIN CALCIUM 10 MG/1
10 TABLET, FILM COATED ORAL DAILY
Qty: 30 TABLET | Refills: 3 | Status: SHIPPED | OUTPATIENT
Start: 2020-11-16 | End: 2021-02-08 | Stop reason: SDUPTHER

## 2020-11-16 RX ORDER — LISINOPRIL AND HYDROCHLOROTHIAZIDE 25; 20 MG/1; MG/1
1 TABLET ORAL DAILY
Qty: 90 TABLET | Refills: 0 | Status: SHIPPED | OUTPATIENT
Start: 2020-11-16 | End: 2021-04-06 | Stop reason: SDUPTHER

## 2020-11-16 NOTE — PROGRESS NOTES
[address]       [address] [place] [address]        [place] [address]         [phone]  [phone]      July 10, 2017  PARMINDER POPELUIS ( 1948)  OFFICE VISIT     PCP: Dr. Darling Hutson  PROBLEM LIST:  Dx Date Dx Code Description Status Laterality T N M Stage     [ICD10] D69.6 Thrombocytopenia, unspecified                 [ICD10] N18.6 End stage renal disease                 [ICD10] D63.1 Anemia in chronic kidney disease                 [ICD10] Z79.01 Long term (current) use of anticoagulants                 HISTORY OF PRESENT ILLNESS:  She presents in follow-up today for thrombocytopenia.  Her most recent platelet count done was stable.  She denies any bleeding or easy bruising.  Overall she feels well and has no complaints.   PAST MEDICAL HISTORY:   Reviewed and unchanged  PAST SURGICAL HISTORY:   Reviewed and unchanged  CURRENT MEDICATIONS:    Drug Name Dose Route Frequency   Allopurinol 100 mg Oral Twice a Day   levetiracetam 500 mg Oral Twice a Day   Acetaminophen 325 mg Oral [id number] PRN   bisacodyl 10 mg MI Daily   Miralax 17 gm Oral Daily PRN   Alprazolam 0.25 mg Oral Twice a Day PRN   Florajen3   Oral Twice a Day   Midodrine 10 mg Oral As Directed   Renvela 800 mg Oral Twice a Day   melatonin 3 mg Oral Every day before sleep   Tessalon 100 mg Oral [id number] PRN   Omeprazole 40 mg Oral Daily   fexofenadine 180 mg Oral Daily   Levothyroxine 150 mcg Oral Daily   Nephro-Prakash   Oral Daily   Docusate Sodium 100 mg Oral Twice a Day   Generlac 30 mL Oral Daily   Coumadin 7.5 mg Oral As Directed   Magnesium Oxide 400 mg Oral QWeek   Vitamin D-3 50,000 IntU Oral QMonth      DISTRESS SCORE:   Not done       PSYCH/EMOTIONAL: In good spirits  REVIEW OF SYSTEMS:  All systems reviewed and within normal limits.  PERFORMANCE STATUS: ECOG score 2.  PHYSICAL EXAMINATION  VITAL SIGNS: Wgt unable to do, /56.  GENERAL: Well developed, well nourished, alert and  Subjective   Tesfaye Smith is a 65 y.o. male.     Chief Complaint   Patient presents with   • Establish Care   • Med Refill       Patient is here today as a new patient to me.  He is here to follow-up on his chronic health conditions:    Hypertension.  The patient takes lisinopril hydrochlorothiazide 20/25 once daily and amlodipine 5 mg daily.  He was just started on the amlodipine a few months ago for some elevated blood pressure readings at that time.  He states that his blood pressure is under good control now at home.  The patient denies ever having a stress test and has never been on a cholesterol-lowering medication.    GERD.  The patient takes Nexium 40 mg daily.  He cannot remember the last occurrence of heartburn.  He states that he does not have any side effects of that medicine.  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  He is followed by a GI specialist.    Arthritis.  The patient states that he suffers from pain in his knees and hips intermittently.  He used to take meloxicam in the past but states that his last physician took him off of it because of his kidneys.  However, the patient denies having kidney problems.  I reviewed his serum creatinine level and it was normal over the last 3 years.  He states that he takes it intermittently and it worked for him.  He would like to have a refill of the meloxicam if possible.       Review of Systems   Constitutional: Negative for activity change, chills, fatigue and fever.   HENT: Negative for hearing loss, swollen glands, tinnitus and trouble swallowing.    Eyes: Negative for pain and visual disturbance.   Respiratory: Negative for cough and shortness of breath.    Cardiovascular: Negative for chest pain, palpitations and leg swelling.   Gastrointestinal: Negative for diarrhea and nausea.   Endocrine: Negative for polydipsia and polyuria.   Genitourinary: Negative for difficulty urinating and urinary  cooperative, and in no acute distress. In a wheelchair; morbidly obese.  HEENT: Normocephalic; PERRL, EOMI, vision is grossly intact; no scleral icterus, teeth and gingiva in good general condition.  NECK: Neck supple, non-tender without lymphadenopathy, masses or thyromegaly.  LUNGS: Clear to auscultation and percussion without rales, rhonchi, wheezing or diminished breath sounds.  CARDIAC: Normal S1 and S2. No S3, S4 or murmurs. Rhythm is regular. There is no peripheral edema, cyanosis or pallor.   ABDOMEN: Positive bowel sounds.   No tenderness.  BREAST: Deferred  EXTREMITIES: No significant deformity or joint abnormality. No edema. Peripheral pulses intact. Extremities are warm and well perfused.  NEURO: CN II-XII intact. Strength and sensation symmetric and intact throughout.   SKIN: Skin normal color, texture and turgor with no lesions or eruptions.  PAIN ASSESSMENT: 0    LABORATORY DATA:  Most recent CBC done in January 2017 was reviewed.            IMPRESSION:    1. Thrombocytopenia.  Unclear etiology, agglutination during dialysis?  Her platelet count has now been stable for the past 6 months    2. Anemia secondary to renal disease.  She is currently on Aranesp during her dialysis.  Most recent hemoglobin was 11.3 g/dL.  PLAN:     1. Platelet count once per month.  2. Return to clinic in 6 months    The patient voiced understanding of these issues and the plan, and expresses consent to proceed.  All of the patient's concerns were addressed and questions were answered.      Electronically signed  Jose Meyer DO  July 10, 2017 1:54PM  Cc: Dr. Darling Hutson       incontinence.   Musculoskeletal: Negative for arthralgias, gait problem and joint swelling.   Skin: Negative for rash.   Allergic/Immunologic: Negative for immunocompromised state.   Neurological: Negative for dizziness, light-headedness and headache.   Hematological: Negative for adenopathy. Does not bruise/bleed easily.   Psychiatric/Behavioral: Negative for dysphoric mood and sleep disturbance.       The following portions of the patient's history were reviewed and updated as appropriate: allergies, current medications, past family history, past medical history, past social history, past surgical history and problem list.    Past Medical History:   Diagnosis Date   • Arthritis    • Almaraz's esophagus    • Bunion of right foot    • Dislocation, metatarsophalangeal 9/7/2016   • Eczema    • Esophageal reflux    • Hypertension    • PONV (postoperative nausea and vomiting)        Past Surgical History:   Procedure Laterality Date   • BUNIONECTOMY Right     RIGHT GREAT TOE   • FOOT FUSION Right 1/24/2017    Procedure: ARTHRODESIS first METATARSALPHALANGEAL JOINT with second metatarsophalageal joint release and second metatarsal osteotomy or resection and second hammertoe correction with proximal phalangeal joint resection of right foot;  Surgeon: Jagdeep Medrano MD;  Location: [facility];  Service:    • HERNIA REPAIR         Family History   Problem Relation Age of Onset   • Ovarian cancer Mother    • Rheum arthritis Mother    • Colon cancer Father    • Hypertension Father    • Stroke Father    • Hypertension Sister        Social History     Socioeconomic History   • Marital status:      Spouse name: Yajaira   • Number of children: 2   • Years of education: Not on file   • Highest education level: Not on file   Occupational History   • Occupation: Retired ( at [employer])   Tobacco Use   • Smoking status: Never Smoker   • Smokeless tobacco: Never Used   Substance and Sexual Activity  "  • Alcohol use: Yes     Comment: occasional   • Drug use: No   • Sexual activity: Defer         Current Outpatient Medications:   •  acetaminophen (TYLENOL) 500 MG tablet, Take 1 tablet by mouth Every 6 (Six) Hours As Needed for Mild Pain  or Moderate Pain ., Disp: , Rfl:   •  amLODIPine (NORVASC) 5 MG tablet, Take 1 tablet by mouth Daily., Disp: 90 tablet, Rfl: 0  •  esomeprazole (nexIUM) 40 MG capsule, Take 1 capsule by mouth Every Morning Before Breakfast., Disp: 90 capsule, Rfl: 1  •  lisinopril-hydrochlorothiazide (PRINZIDE,ZESTORETIC) 20-25 MG per tablet, Take 1 tablet by mouth Daily., Disp: 90 tablet, Rfl: 0  •  Multiple Vitamins-Minerals (CENTRUM SILVER ADULT 50+) tablet, Take 1 tablet by mouth Daily., Disp: , Rfl:   •  atorvastatin (LIPITOR) 10 MG tablet, Take 1 tablet by mouth Daily., Disp: 30 tablet, Rfl: 3  •  Cetirizine HCl (ZYRTEC ALLERGY) 10 MG capsule, Take 1 tablet by mouth As Needed., Disp: , Rfl:   •  meloxicam (Mobic) 7.5 MG tablet, Take 2 tablets by mouth Daily., Disp: 30 tablet, Rfl: 0    Objective     Vitals:    11/16/20 1045   BP: 124/78   Pulse: 77   Temp: 98 °F (36.7 °C)   SpO2: 99%   Weight: 85.8 kg (189 lb 3.2 oz)   Height: 179.1 cm (70.5\")       Body mass index is 26.76 kg/m².    No components found for: 2D    Physical Exam  Vitals signs and nursing note reviewed.   Constitutional:       Appearance: Normal appearance. He is well-developed.   HENT:      Head: Normocephalic and atraumatic.   Eyes:      Conjunctiva/sclera: Conjunctivae normal.   Neck:      Musculoskeletal: Normal range of motion and neck supple.   Cardiovascular:      Rate and Rhythm: Normal rate and regular rhythm.      Heart sounds: Normal heart sounds.   Pulmonary:      Effort: Pulmonary effort is normal.      Breath sounds: Normal breath sounds.   Abdominal:      General: Bowel sounds are normal.      Palpations: Abdomen is soft.   Musculoskeletal: Normal range of motion.      Right lower leg: No edema.      Left lower " leg: No edema.   Skin:     General: Skin is warm and dry.      Capillary Refill: Capillary refill takes less than 2 seconds.      Findings: No rash.   Neurological:      Mental Status: He is alert and oriented to person, place, and time.   Psychiatric:         Mood and Affect: Mood normal.         Behavior: Behavior normal.         Thought Content: Thought content normal.         Judgment: Judgment normal.         Procedures    Assessment/Plan   Diagnoses and all orders for this visit:    1. Essential hypertension (Primary)  -     Comprehensive Metabolic Panel  -     lisinopril-hydrochlorothiazide (PRINZIDE,ZESTORETIC) 20-25 MG per tablet; Take 1 tablet by mouth Daily.  Dispense: 90 tablet; Refill: 0    2. Leukocytosis, unspecified type  -     CBC & Differential    3. Abnormal TSH  -     TSH    4. Gastroesophageal reflux disease, unspecified whether esophagitis present  -     CBC & Differential    5. Almaraz's esophagus with dysplasia  -     CBC & Differential    6. Encounter for long-term (current) use of medications  -     CBC & Differential  -     Comprehensive Metabolic Panel  -     TSH    7. Mixed hyperlipidemia  -     atorvastatin (LIPITOR) 10 MG tablet; Take 1 tablet by mouth Daily.  Dispense: 30 tablet; Refill: 3    8. Accelerated hypertension  -     amLODIPine (NORVASC) 5 MG tablet; Take 1 tablet by mouth Daily.  Dispense: 90 tablet; Refill: 0    9. Arthritis  -     meloxicam (Mobic) 7.5 MG tablet; Take 2 tablets by mouth Daily.  Dispense: 30 tablet; Refill: 0    ASCVD risk was 12.4% on lipids from March 2020.  Therefore, I went ahead and started the patient on atorvastatin per the recommendation today.  I restarted the patient on meloxicam for arthritis and have advised him to use it at the lowest effective dose for the shortest duration.  I warned him that it could worsen heartburn symptoms and Almaraz's esophagitis and to use it sparingly.  The patient voiced understanding.        Patient Instructions  "  High Cholesterol    High cholesterol is a condition in which the blood has high levels of a white, waxy, fat-like substance (cholesterol). The human body needs small amounts of cholesterol. The liver makes all the cholesterol that the body needs. Extra (excess) cholesterol comes from the food that we eat.  Cholesterol is carried from the liver by the blood through the blood vessels. If you have high cholesterol, deposits (plaques) may build up on the walls of your blood vessels (arteries). Plaques make the arteries narrower and stiffer. Cholesterol plaques increase your risk for heart attack and stroke. Work with your health care provider to keep your cholesterol levels in a healthy range.  What increases the risk?  This condition is more likely to develop in people who:  · Eat foods that are high in animal fat (saturated fat) or cholesterol.  · Are overweight.  · Are not getting enough exercise.  · Have a family history of high cholesterol.  What are the signs or symptoms?  There are no symptoms of this condition.  How is this diagnosed?  This condition may be diagnosed from the results of a blood test.  · If you are older than age 20, your health care provider may check your cholesterol every 4-6 years.  · You may be checked more often if you already have high cholesterol or other risk factors for heart disease.  The blood test for cholesterol measures:  · \"Bad\" cholesterol (LDL cholesterol). This is the main type of cholesterol that causes heart disease. The desired level for LDL is less than 100.  · \"Good\" cholesterol (HDL cholesterol). This type helps to protect against heart disease by cleaning the arteries and carrying the LDL away. The desired level for HDL is 60 or higher.  · Triglycerides. These are fats that the body can store or burn for energy. The desired number for triglycerides is lower than 150.  · Total cholesterol. This is a measure of the total amount of cholesterol in your blood, including LDL " cholesterol, HDL cholesterol, and triglycerides. A healthy number is less than 200.  How is this treated?  This condition is treated with diet changes, lifestyle changes, and medicines.  Diet changes  · This may include eating more whole grains, fruits, vegetables, nuts, and fish.  · This may also include cutting back on red meat and foods that have a lot of added sugar.  Lifestyle changes  · Changes may include getting at least 40 minutes of aerobic exercise 3 times a week. Aerobic exercises include walking, biking, and swimming. Aerobic exercise along with a healthy diet can help you maintain a healthy weight.  · Changes may also include quitting smoking.  Medicines  · Medicines are usually given if diet and lifestyle changes have failed to reduce your cholesterol to healthy levels.  · Your health care provider may prescribe a statin medicine. Statin medicines have been shown to reduce cholesterol, which can reduce the risk of heart disease.  Follow these instructions at home:  Eating and drinking  If told by your health care provider:  · Eat chicken (without skin), fish, veal, shellfish, ground turkey breast, and round or loin cuts of red meat.  · Do not eat fried foods or fatty meats, such as hot dogs and salami.  · Eat plenty of fruits, such as apples.  · Eat plenty of vegetables, such as broccoli, potatoes, and carrots.  · Eat beans, peas, and lentils.  · Eat grains such as barley, rice, couscous, and bulgur wheat.  · Eat pasta without cream sauces.  · Use skim or nonfat milk, and eat low-fat or nonfat yogurt and cheeses.  · Do not eat or drink whole milk, cream, ice cream, egg yolks, or hard cheeses.  · Do not eat stick margarine or tub margarines that contain trans fats (also called partially hydrogenated oils).  · Do not eat saturated tropical oils, such as coconut oil and palm oil.  · Do not eat cakes, cookies, crackers, or other baked goods that contain trans fats.    General instructions  · Exercise as  directed by your health care provider. Increase your activity level with activities such as gardening, walking, and taking the stairs.  · Take over-the-counter and prescription medicines only as told by your health care provider.  · Do not use any products that contain nicotine or tobacco, such as cigarettes and e-cigarettes. If you need help quitting, ask your health care provider.  · Keep all follow-up visits as told by your health care provider. This is important.  Contact a health care provider if:  · You are struggling to maintain a healthy diet or weight.  · You need help to start on an exercise program.  · You need help to stop smoking.  Get help right away if:  · You have chest pain.  · You have trouble breathing.  This information is not intended to replace advice given to you by your health care provider. Make sure you discuss any questions you have with your health care provider.  Document Released: 12/18/2006 Document Revised: 12/21/2018 Document Reviewed: 06/17/2017  Elsevier Patient Education © 2020 [employer]

## 2020-11-17 LAB
ALBUMIN SERPL-MCNC: 4.2 G/DL (ref 3.8–4.8)
ALBUMIN/GLOB SERPL: 1.6 {RATIO} (ref 1.2–2.2)
ALP SERPL-CCNC: 93 IU/L (ref 39–117)
ALT SERPL-CCNC: 19 IU/L (ref 0–44)
AST SERPL-CCNC: 14 IU/L (ref 0–40)
BASOPHILS # BLD AUTO: 0.1 X10E3/UL (ref 0–0.2)
BASOPHILS NFR BLD AUTO: 1 %
BILIRUB SERPL-MCNC: 0.4 MG/DL (ref 0–1.2)
BUN SERPL-MCNC: 20 MG/DL (ref 8–27)
BUN/CREAT SERPL: 20 (ref 10–24)
CALCIUM SERPL-MCNC: 9.8 MG/DL (ref 8.6–10.2)
CHLORIDE SERPL-SCNC: 102 MMOL/L (ref 96–106)
CO2 SERPL-SCNC: 27 MMOL/L (ref 20–29)
CREAT SERPL-MCNC: 0.98 MG/DL (ref 0.76–1.27)
EOSINOPHIL # BLD AUTO: 0.1 X10E3/UL (ref 0–0.4)
EOSINOPHIL NFR BLD AUTO: 1 %
ERYTHROCYTE [DISTWIDTH] IN BLOOD BY AUTOMATED COUNT: 14.4 % (ref 11.6–15.4)
GLOBULIN SER CALC-MCNC: 2.7 G/DL (ref 1.5–4.5)
GLUCOSE SERPL-MCNC: 82 MG/DL (ref 65–99)
HCT VFR BLD AUTO: 42.3 % (ref 37.5–51)
HGB BLD-MCNC: 14.2 G/DL (ref 13–17.7)
IMM GRANULOCYTES # BLD AUTO: 0 X10E3/UL (ref 0–0.1)
IMM GRANULOCYTES NFR BLD AUTO: 0 %
LYMPHOCYTES # BLD AUTO: 1.8 X10E3/UL (ref 0.7–3.1)
LYMPHOCYTES NFR BLD AUTO: 19 %
MCH RBC QN AUTO: 30 PG (ref 26.6–33)
MCHC RBC AUTO-ENTMCNC: 33.6 G/DL (ref 31.5–35.7)
MCV RBC AUTO: 89 FL (ref 79–97)
MONOCYTES # BLD AUTO: 0.8 X10E3/UL (ref 0.1–0.9)
MONOCYTES NFR BLD AUTO: 9 %
NEUTROPHILS # BLD AUTO: 6.3 X10E3/UL (ref 1.4–7)
NEUTROPHILS NFR BLD AUTO: 70 %
PLATELET # BLD AUTO: 265 X10E3/UL (ref 150–450)
POTASSIUM SERPL-SCNC: 4.4 MMOL/L (ref 3.5–5.2)
PROT SERPL-MCNC: 6.9 G/DL (ref 6–8.5)
RBC # BLD AUTO: 4.73 X10E6/UL (ref 4.14–5.8)
SODIUM SERPL-SCNC: 142 MMOL/L (ref 134–144)
TSH SERPL DL<=0.005 MIU/L-ACNC: 0.61 UIU/ML (ref 0.45–4.5)
WBC # BLD AUTO: 9 X10E3/UL (ref 3.4–10.8)

## 2020-11-23 ENCOUNTER — TELEPHONE (OUTPATIENT)
Dept: FAMILY MEDICINE CLINIC | Facility: CLINIC | Age: 65
End: 2020-11-23

## 2020-11-23 DIAGNOSIS — M19.90 ARTHRITIS: ICD-10-CM

## 2020-11-23 RX ORDER — MELOXICAM 15 MG/1
15 TABLET ORAL DAILY
Qty: 30 TABLET | Refills: 1 | Status: SHIPPED | OUTPATIENT
Start: 2020-11-23 | End: 2021-03-12 | Stop reason: SDUPTHER

## 2020-11-23 NOTE — TELEPHONE ENCOUNTER
INSURANCE WILL NOT COVER THE 7.5MG BID OF MELOXICAM  AND PATIENT IS WILLING TO DO MELOXICAM 15MG QDAILY. CAN WE CHANGE?

## 2020-11-23 NOTE — TELEPHONE ENCOUNTER
----- Message from Tesfaye Smith sent at 11/23/2020  3:03 PM EST -----  Regarding: RE: Prescription Question  Contact: [phone]  No I can do the 15 mg just make sure you write it for 30 days, 1 tablet a day for 30 days.  I can manage the dosage myself just whatever [facility] needs the wording to approve the prescription or just send it to the local [facility] and I will pick it up                                                                                                                                                                                                                                      Thanks  John

## 2020-12-29 ENCOUNTER — TELEPHONE (OUTPATIENT)
Dept: FAMILY MEDICINE CLINIC | Facility: CLINIC | Age: 65
End: 2020-12-29

## 2020-12-29 NOTE — TELEPHONE ENCOUNTER
Caller: Yajaira Smith    Relationship to patient: Emergency Contact    Best call back number: [phone]     Patient is needing: Yajaira called in and stated patient has a knot on his wrist on the under side on his right hand . Yajaira stated it is not giving him pain but it is soft to touch and is a 1 inch diameter and sticks up a quarter of an inch . Yajaira wants to know if he should be seen or go to a orthopedic doctor or a surgeon. Please call and advise.

## 2020-12-30 ENCOUNTER — OFFICE VISIT (OUTPATIENT)
Dept: FAMILY MEDICINE CLINIC | Facility: CLINIC | Age: 65
End: 2020-12-30

## 2020-12-30 VITALS
TEMPERATURE: 97.5 F | BODY MASS INDEX: 26.38 KG/M2 | DIASTOLIC BLOOD PRESSURE: 80 MMHG | OXYGEN SATURATION: 97 % | HEART RATE: 61 BPM | SYSTOLIC BLOOD PRESSURE: 125 MMHG | WEIGHT: 194.8 LBS | HEIGHT: 72 IN

## 2020-12-30 DIAGNOSIS — R22.31 MASS OF WRIST, RIGHT: ICD-10-CM

## 2020-12-30 DIAGNOSIS — Z79.899 ENCOUNTER FOR LONG-TERM (CURRENT) USE OF MEDICATIONS: ICD-10-CM

## 2020-12-30 DIAGNOSIS — E78.2 MIXED HYPERLIPIDEMIA: Primary | ICD-10-CM

## 2020-12-30 PROCEDURE — 99214 OFFICE O/P EST MOD 30 MIN: CPT | Performed by: FAMILY MEDICINE

## 2020-12-30 NOTE — PROGRESS NOTES
Subjective   Tesfaye Smith is a 65 y.o. male.     Chief Complaint   Patient presents with   • Cyst     on wrist right arm       Patient is here to follow-up on hyperlipidemia.  ASCVD risk was 12.4% on lipids from March 2020.  He was started on atorvastatin 10 mg daily 1 month ago.  The patient has not noticed any side effects of the medication.  He has been taking it every day as directed.    Patient is here today for a new problem.  He noticed a mass on his right wrist about a month ago.  It has grown in size over the past couple of weeks and it has become more tender especially when he moves his wrist in certain positions.  He denies any fever or chills.  He denies any redness to the area.  He also denies any known trauma.       Review of Systems   Constitutional: Negative for activity change, chills, fatigue and fever.   HENT: Negative for hearing loss, swollen glands, tinnitus and trouble swallowing.    Eyes: Negative for pain and visual disturbance.   Respiratory: Negative for cough and shortness of breath.    Cardiovascular: Negative for chest pain, palpitations and leg swelling.   Gastrointestinal: Negative for diarrhea and nausea.   Endocrine: Negative for polydipsia and polyuria.   Genitourinary: Negative for difficulty urinating and urinary incontinence.   Musculoskeletal: Positive for arthralgias. Negative for gait problem and joint swelling.   Skin: Negative for rash.   Allergic/Immunologic: Negative for immunocompromised state.   Neurological: Negative for dizziness, light-headedness and headache.   Hematological: Negative for adenopathy. Does not bruise/bleed easily.   Psychiatric/Behavioral: Negative for dysphoric mood and sleep disturbance.       The following portions of the patient's history were reviewed and updated as appropriate: allergies, current medications, past family history, past medical history, past social history, past surgical history and problem list.    Past Medical History:    Diagnosis Date   • Arthritis    • Almaraz's esophagus    • Bunion of right foot    • Dislocation, metatarsophalangeal 9/7/2016   • Eczema    • Esophageal reflux    • Hypertension    • PONV (postoperative nausea and vomiting)        Past Surgical History:   Procedure Laterality Date   • BUNIONECTOMY Right     RIGHT GREAT TOE   • FOOT FUSION Right 1/24/2017    Procedure: ARTHRODESIS first METATARSALPHALANGEAL JOINT with second metatarsophalageal joint release and second metatarsal osteotomy or resection and second hammertoe correction with proximal phalangeal joint resection of right foot;  Surgeon: Jagdeep Medrano MD;  Location: [facility] [place];  Service:    • HERNIA REPAIR         Family History   Problem Relation Age of Onset   • Ovarian cancer Mother    • Rheum arthritis Mother    • Colon cancer Father    • Hypertension Father    • Stroke Father    • Hypertension Sister        Social History     Socioeconomic History   • Marital status:      Spouse name: Yajaira   • Number of children: 2   • Years of education: Not on file   • Highest education level: Not on file   Occupational History   • Occupation: Retired ( at [employer])   Tobacco Use   • Smoking status: Never Smoker   • Smokeless tobacco: Never Used   Substance and Sexual Activity   • Alcohol use: Yes     Comment: occasional   • Drug use: No   • Sexual activity: Defer         Current Outpatient Medications:   •  amLODIPine (NORVASC) 5 MG tablet, Take 1 tablet by mouth Daily., Disp: 90 tablet, Rfl: 0  •  atorvastatin (LIPITOR) 10 MG tablet, Take 1 tablet by mouth Daily., Disp: 30 tablet, Rfl: 3  •  esomeprazole (nexIUM) 40 MG capsule, Take 1 capsule by mouth Every Morning Before Breakfast., Disp: 90 capsule, Rfl: 1  •  lisinopril-hydrochlorothiazide (PRINZIDE,ZESTORETIC) 20-25 MG per tablet, Take 1 tablet by mouth Daily., Disp: 90 tablet, Rfl: 0  •  meloxicam (MOBIC) 15 MG tablet, Take 1 tablet by mouth Daily., Disp: 30 tablet,  "Rfl: 1  •  Multiple Vitamins-Minerals (CENTRUM SILVER ADULT 50+) tablet, Take 1 tablet by mouth Daily., Disp: , Rfl:   •  acetaminophen (TYLENOL) 500 MG tablet, Take 1 tablet by mouth Every 6 (Six) Hours As Needed for Mild Pain  or Moderate Pain ., Disp: , Rfl:   •  Cetirizine HCl (ZYRTEC ALLERGY) 10 MG capsule, Take 1 tablet by mouth As Needed., Disp: , Rfl:     Objective     Vitals:    12/30/20 0945   BP: 125/80   Pulse: 61   Temp: 97.5 °F (36.4 °C)   SpO2: 97%   Weight: 88.4 kg (194 lb 12.8 oz)   Height: 182.9 cm (72\")       Body mass index is 26.42 kg/m².    No components found for: 2D    Physical Exam  Vitals signs and nursing note reviewed.   Constitutional:       Appearance: Normal appearance. He is well-developed.   HENT:      Head: Normocephalic and atraumatic.   Eyes:      General: No scleral icterus.     Conjunctiva/sclera: Conjunctivae normal.   Neck:      Musculoskeletal: Normal range of motion and neck supple.   Cardiovascular:      Rate and Rhythm: Normal rate and regular rhythm.      Pulses: Normal pulses.      Heart sounds: Normal heart sounds.   Pulmonary:      Effort: Pulmonary effort is normal.      Breath sounds: Normal breath sounds.   Abdominal:      General: Bowel sounds are normal.      Palpations: Abdomen is soft.   Musculoskeletal: Normal range of motion.         General: Tenderness present.      Right lower leg: No edema.      Left lower leg: No edema.      Comments: Right anterior wrist with a 1-1/2 x 1-1/2 cm fixed compressible mass on the lateral side.  No erythema or induration is noted.  No ecchymosis is noted.  Distal pulses normal.  Cap refill is brisk distally   Skin:     General: Skin is warm and dry.      Capillary Refill: Capillary refill takes less than 2 seconds.      Findings: No rash.   Neurological:      General: No focal deficit present.      Mental Status: He is alert and oriented to person, place, and time.   Psychiatric:         Mood and Affect: Mood normal.         " Behavior: Behavior normal.         Thought Content: Thought content normal.         Judgment: Judgment normal.         Procedures    Assessment/Plan   Diagnoses and all orders for this visit:    1. Mixed hyperlipidemia (Primary)  -     Comprehensive Metabolic Panel    2. Mass of wrist, right  -     Ambulatory Referral to Hand Surgery    3. Encounter for long-term (current) use of medications  -     Comprehensive Metabolic Panel    I will have the patient seen by hand surgeon to see if there is any need for surgery at this time.  Surveillance labs were obtained today and any medication changes will be made based on lab results and will be called to the patient later this week.    There are no Patient Instructions on file for this visit.

## 2020-12-31 LAB
ALBUMIN SERPL-MCNC: 4.5 G/DL (ref 3.5–5.2)
ALBUMIN/GLOB SERPL: 2 G/DL
ALP SERPL-CCNC: 81 U/L (ref 39–117)
ALT SERPL-CCNC: 19 U/L (ref 1–41)
AST SERPL-CCNC: 18 U/L (ref 1–40)
BILIRUB SERPL-MCNC: 0.5 MG/DL (ref 0–1.2)
BUN SERPL-MCNC: 17 MG/DL (ref 8–23)
BUN/CREAT SERPL: 16.8 (ref 7–25)
CALCIUM SERPL-MCNC: 9.6 MG/DL (ref 8.6–10.5)
CHLORIDE SERPL-SCNC: 102 MMOL/L (ref 98–107)
CO2 SERPL-SCNC: 31.5 MMOL/L (ref 22–29)
CREAT SERPL-MCNC: 1.01 MG/DL (ref 0.76–1.27)
GLOBULIN SER CALC-MCNC: 2.3 GM/DL
GLUCOSE SERPL-MCNC: 96 MG/DL (ref 65–99)
POTASSIUM SERPL-SCNC: 4.5 MMOL/L (ref 3.5–5.2)
PROT SERPL-MCNC: 6.8 G/DL (ref 6–8.5)
SODIUM SERPL-SCNC: 141 MMOL/L (ref 136–145)

## 2021-02-08 DIAGNOSIS — E78.2 MIXED HYPERLIPIDEMIA: ICD-10-CM

## 2021-02-08 RX ORDER — ATORVASTATIN CALCIUM 10 MG/1
10 TABLET, FILM COATED ORAL DAILY
Qty: 30 TABLET | Refills: 2 | Status: SHIPPED | OUTPATIENT
Start: 2021-02-08 | End: 2021-04-12 | Stop reason: SDUPTHER

## 2021-03-08 DIAGNOSIS — I10 ACCELERATED HYPERTENSION: ICD-10-CM

## 2021-03-08 RX ORDER — AMLODIPINE BESYLATE 5 MG/1
TABLET ORAL
Qty: 90 TABLET | Refills: 0 | Status: SHIPPED | OUTPATIENT
Start: 2021-03-08 | End: 2021-04-06 | Stop reason: SDUPTHER

## 2021-03-12 DIAGNOSIS — M19.90 ARTHRITIS: ICD-10-CM

## 2021-03-12 RX ORDER — MELOXICAM 15 MG/1
15 TABLET ORAL DAILY
Qty: 30 TABLET | Refills: 2 | Status: SHIPPED | OUTPATIENT
Start: 2021-03-12 | End: 2021-08-10 | Stop reason: SDUPTHER

## 2021-03-19 ENCOUNTER — BULK ORDERING (OUTPATIENT)
Dept: CASE MANAGEMENT | Facility: OTHER | Age: 66
End: 2021-03-19

## 2021-03-19 DIAGNOSIS — Z23 IMMUNIZATION DUE: ICD-10-CM

## 2021-03-30 ENCOUNTER — PATIENT MESSAGE (OUTPATIENT)
Dept: FAMILY MEDICINE CLINIC | Facility: CLINIC | Age: 66
End: 2021-03-30

## 2021-03-30 NOTE — TELEPHONE ENCOUNTER
"From: Tesfaye Smith  To: Gina Hung DO  Sent: 3/30/2021 11:26 AM EDT  Subject: Non-Urgent Medical Question    My Chart shows I had Pneumococcal Vaccine at [facility] on 12/30/2019 and it is \"Due\" does that vaccine last for several years?  "

## 2021-04-06 ENCOUNTER — OFFICE VISIT (OUTPATIENT)
Dept: FAMILY MEDICINE CLINIC | Facility: CLINIC | Age: 66
End: 2021-04-06

## 2021-04-06 VITALS
HEART RATE: 64 BPM | TEMPERATURE: 97.8 F | BODY MASS INDEX: 26.47 KG/M2 | HEIGHT: 72 IN | WEIGHT: 195.4 LBS | DIASTOLIC BLOOD PRESSURE: 70 MMHG | OXYGEN SATURATION: 99 % | SYSTOLIC BLOOD PRESSURE: 122 MMHG

## 2021-04-06 DIAGNOSIS — M19.90 ARTHRITIS: ICD-10-CM

## 2021-04-06 DIAGNOSIS — Z79.899 ENCOUNTER FOR LONG-TERM (CURRENT) USE OF MEDICATIONS: ICD-10-CM

## 2021-04-06 DIAGNOSIS — E78.2 MIXED HYPERLIPIDEMIA: ICD-10-CM

## 2021-04-06 DIAGNOSIS — Z00.00 ENCOUNTER FOR SUBSEQUENT ANNUAL WELLNESS VISIT (AWV) IN MEDICARE PATIENT: Primary | ICD-10-CM

## 2021-04-06 DIAGNOSIS — K21.9 GASTROESOPHAGEAL REFLUX DISEASE: ICD-10-CM

## 2021-04-06 DIAGNOSIS — I10 ACCELERATED HYPERTENSION: ICD-10-CM

## 2021-04-06 DIAGNOSIS — I10 ESSENTIAL HYPERTENSION: ICD-10-CM

## 2021-04-06 PROCEDURE — G0439 PPPS, SUBSEQ VISIT: HCPCS | Performed by: FAMILY MEDICINE

## 2021-04-06 PROCEDURE — 99213 OFFICE O/P EST LOW 20 MIN: CPT | Performed by: FAMILY MEDICINE

## 2021-04-06 RX ORDER — ESOMEPRAZOLE MAGNESIUM 40 MG/1
40 CAPSULE, DELAYED RELEASE ORAL
Qty: 90 CAPSULE | Refills: 1 | Status: SHIPPED | OUTPATIENT
Start: 2021-04-06 | End: 2021-05-29 | Stop reason: SDUPTHER

## 2021-04-06 RX ORDER — LISINOPRIL AND HYDROCHLOROTHIAZIDE 25; 20 MG/1; MG/1
1 TABLET ORAL DAILY
Qty: 90 TABLET | Refills: 1 | Status: SHIPPED | OUTPATIENT
Start: 2021-04-06 | End: 2021-05-29 | Stop reason: SDUPTHER

## 2021-04-06 RX ORDER — AMLODIPINE BESYLATE 5 MG/1
5 TABLET ORAL DAILY
Qty: 90 TABLET | Refills: 1 | Status: SHIPPED | OUTPATIENT
Start: 2021-04-06 | End: 2021-05-29 | Stop reason: SDUPTHER

## 2021-04-06 NOTE — PROGRESS NOTES
The ABCs of the Annual Wellness Visit  Subsequent Medicare Wellness Visit    Chief Complaint   Patient presents with   • Medicare Wellness-subsequent     Patient is also here to follow-up on hyperlipidemia.  ASCVD risk was 12.4% on lipids from March 2020.  He was started on atorvastatin 10 mg daily 5 month ago.  The patient has not noticed any side effects of the medication.  He has been taking it every day as directed.    Subjective   History of Present Illness:  Tesfaye Smith is a 65 y.o. male who presents for a Subsequent Medicare Wellness Visit.    HEALTH RISK ASSESSMENT    Recent Hospitalizations:  No hospitalization(s) within the last year.    Current Medical Providers:  Patient Care Team:  Gina Hung DO as PCP - General (Family Medicine)    Smoking Status:  Social History     Tobacco Use   Smoking Status Never Smoker   Smokeless Tobacco Never Used       Alcohol Consumption:  Social History     Substance and Sexual Activity   Alcohol Use Yes    Comment: occasional       Depression Screen:   PHQ-2/PHQ-9 Depression Screening 4/6/2021   Little interest or pleasure in doing things 0   Feeling down, depressed, or hopeless 0   Trouble falling or staying asleep, or sleeping too much -   Feeling tired or having little energy -   Poor appetite or overeating -   Feeling bad about yourself - or that you are a failure or have let yourself or your family down -   Trouble concentrating on things, such as reading the newspaper or watching television -   Moving or speaking so slowly that other people could have noticed. Or the opposite - being so fidgety or restless that you have been moving around a lot more than usual -   Thoughts that you would be better off dead, or of hurting yourself in some way -   Total Score 0   If you checked off any problems, how difficult have these problems made it for you to do your work, take care of things at home, or get along with other people? -       Fall Risk Screen:  NATY Garcia  Risk Assessment was completed, and patient is at LOW risk for falls.Assessment completed on:4/6/2021    Health Habits and Functional and Cognitive Screening:  Functional & Cognitive Status 4/6/2021   Do you have difficulty preparing food and eating? No   Do you have difficulty bathing yourself, getting dressed or grooming yourself? No   Do you have difficulty using the toilet? No   Do you have difficulty moving around from place to place? No   Do you have trouble with steps or getting out of a bed or a chair? No   Current Diet Well Balanced Diet   Dental Exam Up to date   Eye Exam Up to date   Exercise (times per week) 1 times per week   Current Exercise Activities Include Walking   Do you need help using the phone?  No   Are you deaf or do you have serious difficulty hearing?  No   Do you need help with transportation? No   Do you need help shopping? No   Do you need help preparing meals?  No   Do you need help with housework?  No   Do you need help with laundry? No   Do you need help taking your medications? No   Do you need help managing money? No   Do you ever drive or ride in a car without wearing a seat belt? No   Have you felt unusual stress, anger or loneliness in the last month? -   Who do you live with? -   If you need help, do you have trouble finding someone available to you? -   Have you been bothered in the last four weeks by sexual problems? -   Do you have difficulty concentrating, remembering or making decisions? -         Does the patient have evidence of cognitive impairment? No    Asprin use counseling:Does not need ASA (and currently is not on it)    Age-appropriate Screening Schedule:  Refer to the list below for future screening recommendations based on patient's age, sex and/or medical conditions. Orders for these recommended tests are listed in the plan section. The patient has been provided with a written plan.    Health Maintenance   Topic Date Due   • LIPID PANEL  03/02/2021   • INFLUENZA  VACCINE  08/01/2021   • COLONOSCOPY  10/07/2024   • TDAP/TD VACCINES (3 - Td) 11/08/2029   • ZOSTER VACCINE  Completed          The following portions of the patient's history were reviewed and updated as appropriate: allergies, current medications, past family history, past medical history, past social history, past surgical history and problem list.    Outpatient Medications Prior to Visit   Medication Sig Dispense Refill   • atorvastatin (LIPITOR) 10 MG tablet Take 1 tablet by mouth Daily. 30 tablet 2   • meloxicam (MOBIC) 15 MG tablet Take 1 tablet by mouth Daily. 30 tablet 2   • Multiple Vitamins-Minerals (CENTRUM SILVER ADULT 50+) tablet Take 1 tablet by mouth Daily.     • amLODIPine (NORVASC) 5 MG tablet TAKE 1 TABLET DAILY 90 tablet 0   • esomeprazole (nexIUM) 40 MG capsule Take 1 capsule by mouth Every Morning Before Breakfast. 90 capsule 1   • lisinopril-hydrochlorothiazide (PRINZIDE,ZESTORETIC) 20-25 MG per tablet Take 1 tablet by mouth Daily. 90 tablet 0   • acetaminophen (TYLENOL) 500 MG tablet Take 1 tablet by mouth Every 6 (Six) Hours As Needed for Mild Pain  or Moderate Pain .     • Cetirizine HCl (ZYRTEC ALLERGY) 10 MG capsule Take 1 tablet by mouth As Needed.       No facility-administered medications prior to visit.       Patient Active Problem List   Diagnosis   • Eczema   • Accelerated hypertension   • Acquired deformity of right foot   • Hallux valgus   • Metatarsalgia of right foot   • Gastroesophageal reflux disease   • Bilateral hip pain   • Abnormal CBC   • Almaraz's esophagus       Advanced Care Planning:  ACP discussion was held with the patient during this visit. Patient does not have an advance directive, information provided.    Review of Systems   Constitutional: Negative for activity change, appetite change, fatigue and unexpected weight change.   Respiratory: Negative for cough, shortness of breath and wheezing.    Cardiovascular: Negative for chest pain, palpitations and leg  "swelling.   Gastrointestinal: Negative for abdominal pain, blood in stool and nausea.   Genitourinary: Negative for dysuria, frequency and urgency.   Musculoskeletal: Negative for arthralgias, joint swelling and myalgias.   Allergic/Immunologic: Negative for environmental allergies, food allergies and immunocompromised state.   Neurological: Negative for dizziness, weakness and headaches.   Hematological: Negative for adenopathy. Does not bruise/bleed easily.   Psychiatric/Behavioral: Negative for confusion, dysphoric mood and sleep disturbance. The patient is not nervous/anxious.        Compared to one year ago, the patient feels his physical health is the same.  Compared to one year ago, the patient feels his mental health is the same.    Reviewed chart for potential of high risk medication in the elderly: yes  Reviewed chart for potential of harmful drug interactions in the elderly:yes    Objective         Vitals:    04/06/21 1007   BP: 122/70   Pulse: 64   Temp: 97.8 °F (36.6 °C)   SpO2: 99%   Weight: 88.6 kg (195 lb 6.4 oz)   Height: 182.9 cm (72.01\")       Body mass index is 26.5 kg/m².  Discussed the patient's BMI with him. The BMI is in the acceptable range.    Physical Exam  Vitals and nursing note reviewed.   Constitutional:       Appearance: Normal appearance. He is well-developed.   HENT:      Head: Normocephalic and atraumatic.   Eyes:      Conjunctiva/sclera: Conjunctivae normal.   Cardiovascular:      Rate and Rhythm: Normal rate and regular rhythm.      Heart sounds: Normal heart sounds.   Pulmonary:      Effort: Pulmonary effort is normal.      Breath sounds: Normal breath sounds.   Musculoskeletal:         General: Normal range of motion.      Cervical back: Normal range of motion and neck supple.   Skin:     General: Skin is warm and dry.      Capillary Refill: Capillary refill takes less than 2 seconds.      Findings: No rash.   Neurological:      Mental Status: He is alert and oriented to " person, place, and time.   Psychiatric:         Mood and Affect: Mood normal.         Behavior: Behavior normal.         Thought Content: Thought content normal.         Judgment: Judgment normal.             Assessment/Plan   Medicare Risks and Personalized Health Plan  CMS Preventative Services Quick Reference  Advance Directive Discussion  Cardiovascular risk  Colon Cancer Screening  Dementia/Memory   Diabetic Lab Screening   Fall Risk  Glaucoma Risk  Immunizations Discussed/Encouraged (specific immunizations; Pneumococcal 23 )  Prostate Cancer Screening     The above risks/problems have been discussed with the patient.  Pertinent information has been shared with the patient in the After Visit Summary.  Follow up plans and orders are seen below in the Assessment/Plan Section.    Diagnoses and all orders for this visit:    1. Encounter for subsequent annual wellness visit (AWV) in Medicare patient (Primary)    2. Mixed hyperlipidemia  -     Lipid Panel    3. Encounter for long-term (current) use of medications  -     Lipid Panel  -     Comprehensive Metabolic Panel    4. Essential hypertension  -     lisinopril-hydrochlorothiazide (PRINZIDE,ZESTORETIC) 20-25 MG per tablet; Take 1 tablet by mouth Daily.  Dispense: 90 tablet; Refill: 1    5. Accelerated hypertension  -     amLODIPine (NORVASC) 5 MG tablet; Take 1 tablet by mouth Daily.  Dispense: 90 tablet; Refill: 1    6. Gastroesophageal reflux disease  -     esomeprazole (nexIUM) 40 MG capsule; Take 1 capsule by mouth Every Morning Before Breakfast.  Dispense: 90 capsule; Refill: 1    7. Arthritis      Patient is also here for chronic stable hyperlipidemia. Surveillance labs were obtained today and any medication changes will be made based on lab results and will be called to the patient later this week.    Follow Up:  Return in about 4 months (around 8/6/2021) for Next scheduled follow up HTN, Lipids, GERD.     An After Visit Summary and PPPS were given to the  patient.

## 2021-04-07 LAB
ALBUMIN SERPL-MCNC: 4.4 G/DL (ref 3.5–5.2)
ALBUMIN/GLOB SERPL: 1.8 G/DL
ALP SERPL-CCNC: 90 U/L (ref 39–117)
ALT SERPL-CCNC: 18 U/L (ref 1–41)
AST SERPL-CCNC: 18 U/L (ref 1–40)
BILIRUB SERPL-MCNC: 0.6 MG/DL (ref 0–1.2)
BUN SERPL-MCNC: 15 MG/DL (ref 8–23)
BUN/CREAT SERPL: 14.9 (ref 7–25)
CALCIUM SERPL-MCNC: 9.5 MG/DL (ref 8.6–10.5)
CHLORIDE SERPL-SCNC: 101 MMOL/L (ref 98–107)
CHOLEST SERPL-MCNC: 139 MG/DL (ref 0–200)
CO2 SERPL-SCNC: 31.3 MMOL/L (ref 22–29)
CREAT SERPL-MCNC: 1.01 MG/DL (ref 0.76–1.27)
GLOBULIN SER CALC-MCNC: 2.4 GM/DL
GLUCOSE SERPL-MCNC: 86 MG/DL (ref 65–99)
HDLC SERPL-MCNC: 72 MG/DL (ref 40–60)
LDLC SERPL CALC-MCNC: 56 MG/DL (ref 0–100)
POTASSIUM SERPL-SCNC: 4.6 MMOL/L (ref 3.5–5.2)
PROT SERPL-MCNC: 6.8 G/DL (ref 6–8.5)
SODIUM SERPL-SCNC: 143 MMOL/L (ref 136–145)
TRIGL SERPL-MCNC: 48 MG/DL (ref 0–150)
VLDLC SERPL CALC-MCNC: 11 MG/DL (ref 5–40)

## 2021-04-10 ENCOUNTER — PATIENT MESSAGE (OUTPATIENT)
Dept: FAMILY MEDICINE CLINIC | Facility: CLINIC | Age: 66
End: 2021-04-10

## 2021-04-10 DIAGNOSIS — E78.2 MIXED HYPERLIPIDEMIA: ICD-10-CM

## 2021-04-12 RX ORDER — ATORVASTATIN CALCIUM 10 MG/1
10 TABLET, FILM COATED ORAL DAILY
Qty: 90 TABLET | Refills: 3 | Status: SHIPPED | OUTPATIENT
Start: 2021-04-12 | End: 2021-08-10 | Stop reason: SDUPTHER

## 2021-04-12 NOTE — TELEPHONE ENCOUNTER
From: Tesfaye Smith  To: Gina Hung DO  Sent: 4/10/2021 10:55 AM EDT  Subject: Prescription Question    Based on the test results should I refill the Atorvastatin 10MG, my prescription has 1 refill left.

## 2021-05-23 DIAGNOSIS — I10 ESSENTIAL HYPERTENSION: ICD-10-CM

## 2021-05-24 RX ORDER — LISINOPRIL AND HYDROCHLOROTHIAZIDE 25; 20 MG/1; MG/1
TABLET ORAL
Qty: 90 TABLET | Refills: 3 | OUTPATIENT
Start: 2021-05-24

## 2021-05-28 DIAGNOSIS — I10 ACCELERATED HYPERTENSION: ICD-10-CM

## 2021-05-28 DIAGNOSIS — I10 ESSENTIAL HYPERTENSION: ICD-10-CM

## 2021-05-28 RX ORDER — AMLODIPINE BESYLATE 5 MG/1
TABLET ORAL
Qty: 90 TABLET | Refills: 3 | OUTPATIENT
Start: 2021-05-28

## 2021-05-28 RX ORDER — LISINOPRIL AND HYDROCHLOROTHIAZIDE 25; 20 MG/1; MG/1
TABLET ORAL
Qty: 90 TABLET | Refills: 3 | OUTPATIENT
Start: 2021-05-28

## 2021-05-29 DIAGNOSIS — I10 ACCELERATED HYPERTENSION: ICD-10-CM

## 2021-05-29 DIAGNOSIS — K21.9 GASTROESOPHAGEAL REFLUX DISEASE: ICD-10-CM

## 2021-05-29 DIAGNOSIS — I10 ESSENTIAL HYPERTENSION: ICD-10-CM

## 2021-06-02 RX ORDER — AMLODIPINE BESYLATE 5 MG/1
5 TABLET ORAL DAILY
Qty: 90 TABLET | Refills: 1 | Status: SHIPPED | OUTPATIENT
Start: 2021-06-02 | End: 2021-08-10 | Stop reason: SDUPTHER

## 2021-06-02 RX ORDER — LISINOPRIL AND HYDROCHLOROTHIAZIDE 25; 20 MG/1; MG/1
1 TABLET ORAL DAILY
Qty: 90 TABLET | Refills: 1 | Status: SHIPPED | OUTPATIENT
Start: 2021-06-02 | End: 2021-08-10 | Stop reason: SDUPTHER

## 2021-06-02 RX ORDER — ESOMEPRAZOLE MAGNESIUM 40 MG/1
40 CAPSULE, DELAYED RELEASE ORAL
Qty: 90 CAPSULE | Refills: 1 | Status: SHIPPED | OUTPATIENT
Start: 2021-06-02 | End: 2021-08-10 | Stop reason: SDUPTHER

## 2021-08-10 ENCOUNTER — OFFICE VISIT (OUTPATIENT)
Dept: FAMILY MEDICINE CLINIC | Facility: CLINIC | Age: 66
End: 2021-08-10

## 2021-08-10 VITALS
DIASTOLIC BLOOD PRESSURE: 80 MMHG | SYSTOLIC BLOOD PRESSURE: 120 MMHG | BODY MASS INDEX: 26.94 KG/M2 | HEART RATE: 69 BPM | TEMPERATURE: 97.5 F | OXYGEN SATURATION: 98 % | HEIGHT: 71 IN | WEIGHT: 192.4 LBS

## 2021-08-10 DIAGNOSIS — I10 ACCELERATED HYPERTENSION: ICD-10-CM

## 2021-08-10 DIAGNOSIS — Z79.899 ENCOUNTER FOR LONG-TERM (CURRENT) USE OF MEDICATIONS: ICD-10-CM

## 2021-08-10 DIAGNOSIS — K21.9 GASTROESOPHAGEAL REFLUX DISEASE: ICD-10-CM

## 2021-08-10 DIAGNOSIS — K21.9 GASTROESOPHAGEAL REFLUX DISEASE, UNSPECIFIED WHETHER ESOPHAGITIS PRESENT: ICD-10-CM

## 2021-08-10 DIAGNOSIS — M19.90 ARTHRITIS: ICD-10-CM

## 2021-08-10 DIAGNOSIS — I10 ESSENTIAL HYPERTENSION: Primary | ICD-10-CM

## 2021-08-10 DIAGNOSIS — E78.2 MIXED HYPERLIPIDEMIA: ICD-10-CM

## 2021-08-10 PROCEDURE — 99214 OFFICE O/P EST MOD 30 MIN: CPT | Performed by: FAMILY MEDICINE

## 2021-08-10 RX ORDER — MELOXICAM 15 MG/1
15 TABLET ORAL DAILY
Qty: 30 TABLET | Refills: 2 | Status: SHIPPED | OUTPATIENT
Start: 2021-08-10 | End: 2021-11-09 | Stop reason: SDUPTHER

## 2021-08-10 RX ORDER — ESOMEPRAZOLE MAGNESIUM 40 MG/1
40 CAPSULE, DELAYED RELEASE ORAL
Qty: 90 CAPSULE | Refills: 1 | Status: SHIPPED | OUTPATIENT
Start: 2021-08-10 | End: 2021-11-10 | Stop reason: SDUPTHER

## 2021-08-10 RX ORDER — AMLODIPINE BESYLATE 5 MG/1
5 TABLET ORAL DAILY
Qty: 90 TABLET | Refills: 1 | Status: SHIPPED | OUTPATIENT
Start: 2021-08-10 | End: 2021-11-09 | Stop reason: SDUPTHER

## 2021-08-10 RX ORDER — LISINOPRIL AND HYDROCHLOROTHIAZIDE 25; 20 MG/1; MG/1
1 TABLET ORAL DAILY
Qty: 90 TABLET | Refills: 1 | Status: SHIPPED | OUTPATIENT
Start: 2021-08-10 | End: 2021-11-10 | Stop reason: SDUPTHER

## 2021-08-10 RX ORDER — ATORVASTATIN CALCIUM 10 MG/1
10 TABLET, FILM COATED ORAL DAILY
Qty: 90 TABLET | Refills: 3 | Status: SHIPPED | OUTPATIENT
Start: 2021-08-10 | End: 2021-11-09 | Stop reason: SDUPTHER

## 2021-08-10 NOTE — PROGRESS NOTES
"Chief Complaint  Hypertension, Hyperlipidemia, and Heartburn    Subjective          Tesfaye Smith presents to [facility]  Patient is here to follow-up on his chronic health conditions:    Hypertension.  The patient takes lisinopril hydrochlorothiazide 20/25 once daily and amlodipine 5 mg daily.  He was just started on the amlodipine a few months ago for some elevated blood pressure readings at that time.  He states that his blood pressure is under good control now at home.  The patient denies ever having a stress test and has never been on a cholesterol-lowering medication.    Hyperlipidemia.  ASCVD risk was 12.4% on lipids from March 2020.  He is taking atorvastatin 10 mg daily.  The patient has not noticed any side effects of the medication.  He has been taking it every day as directed.    GERD.  The patient takes Nexium 40 mg daily.  He cannot remember the last occurrence of heartburn.  He states that he does not have any side effects of that medicine.  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  He is also followed by a GI specialist.    Arthritis.  The patient states that he suffers from pain in his knees and hips intermittently.  He intermittently takes meloxicam 15 mg daily.  He denies any side effects.      Objective   Vital Signs:   /80   Pulse 69   Temp 97.5 °F (36.4 °C)   Ht 179.1 cm (70.5\")   Wt 87.3 kg (192 lb 6.4 oz)   SpO2 98%   BMI 27.22 kg/m²     Physical Exam  Vitals and nursing note reviewed.   Constitutional:       Appearance: Normal appearance. He is well-developed.   HENT:      Head: Normocephalic and atraumatic.   Eyes:      General: No scleral icterus.     Conjunctiva/sclera: Conjunctivae normal.   Cardiovascular:      Rate and Rhythm: Normal rate and regular rhythm.      Heart sounds: Normal heart sounds.   Pulmonary:      Effort: Pulmonary effort is normal.      Breath sounds: Normal breath sounds. "   Musculoskeletal:         General: Normal range of motion.      Cervical back: Normal range of motion and neck supple.   Skin:     General: Skin is warm and dry.      Capillary Refill: Capillary refill takes less than 2 seconds.      Findings: No rash.   Neurological:      Mental Status: He is alert and oriented to person, place, and time.   Psychiatric:         Mood and Affect: Mood normal.         Behavior: Behavior normal.         Thought Content: Thought content normal.         Judgment: Judgment normal.        Result Review :   The following data was reviewed by: Gina Hung DO on 08/10/2021:  Common labs    Common Labsle 11/16/20 11/16/20 12/30/20 4/6/21 4/6/21    1151 1151  1045 1045   Glucose  82 96  86   BUN  20 17  15   Creatinine  0.98 1.01  1.01   eGFR Non  Am  79 74  74   eGFR  Am  92 89  89   Sodium  142 141  143   Potassium  4.4 4.5  4.6   Chloride  102 102  101   Calcium  9.8 9.6  9.5   Total Protein  6.9 6.8  6.8   Albumin  4.2 4.50  4.40   Total Bilirubin  0.4 0.5  0.6   Alkaline Phosphatase  93 81  90   AST (SGOT)  14 18  18   ALT (SGPT)  19 19  18   WBC 9.0       Hemoglobin 14.2       Hematocrit 42.3       Platelets 265       Total Cholesterol    139    Triglycerides    48    HDL Cholesterol    72 (A)    LDL Cholesterol     56    (A) Abnormal value       Comments are available for some flowsheets but are not being displayed.           TSH    TSH 9/17/20 11/16/20   TSH 0.329 (A) 0.612   (A) Abnormal value                      Assessment and Plan    Diagnoses and all orders for this visit:    1. Essential hypertension (Primary)  -     Comprehensive Metabolic Panel  -     lisinopril-hydrochlorothiazide (PRINZIDE,ZESTORETIC) 20-25 MG per tablet; Take 1 tablet by mouth Daily.  Dispense: 90 tablet; Refill: 1    2. Gastroesophageal reflux disease, unspecified whether esophagitis present  -     CBC & Differential    3. Mixed hyperlipidemia  -     atorvastatin (LIPITOR) 10 MG tablet;  Take 1 tablet by mouth Daily.  Dispense: 90 tablet; Refill: 3    4. Encounter for long-term (current) use of medications  -     TSH  -     CBC & Differential  -     Comprehensive Metabolic Panel    5. Accelerated hypertension  -     amLODIPine (NORVASC) 5 MG tablet; Take 1 tablet by mouth Daily.  Dispense: 90 tablet; Refill: 1    6. Gastroesophageal reflux disease  -     esomeprazole (nexIUM) 40 MG capsule; Take 1 capsule by mouth Every Morning Before Breakfast.  Dispense: 90 capsule; Refill: 1    7. Arthritis  -     meloxicam (MOBIC) 15 MG tablet; Take 1 tablet by mouth Daily.  Dispense: 30 tablet; Refill: 2    Patient is here for chronic stable hypertension, GERD, and hyperlipidemia.  Surveillance labs were obtained today and any medication changes will be made based on lab results and will be called to the patient later this week.      Follow Up   Return in about 3 months (around 11/10/2021) for HTN.  Patient was given instructions and counseling regarding his condition or for health maintenance advice. Please see specific information pulled into the AVS if appropriate.

## 2021-08-11 LAB
ALBUMIN SERPL-MCNC: 4.4 G/DL (ref 3.5–5.2)
ALBUMIN/GLOB SERPL: 1.7 G/DL
ALP SERPL-CCNC: 77 U/L (ref 39–117)
ALT SERPL-CCNC: 20 U/L (ref 1–41)
AST SERPL-CCNC: 20 U/L (ref 1–40)
BASOPHILS # BLD AUTO: 0.05 10*3/MM3 (ref 0–0.2)
BASOPHILS NFR BLD AUTO: 0.6 % (ref 0–1.5)
BILIRUB SERPL-MCNC: 0.4 MG/DL (ref 0–1.2)
BUN SERPL-MCNC: 19 MG/DL (ref 8–23)
BUN/CREAT SERPL: 15.3 (ref 7–25)
CALCIUM SERPL-MCNC: 10.1 MG/DL (ref 8.6–10.5)
CHLORIDE SERPL-SCNC: 102 MMOL/L (ref 98–107)
CO2 SERPL-SCNC: 29.6 MMOL/L (ref 22–29)
CREAT SERPL-MCNC: 1.24 MG/DL (ref 0.76–1.27)
EOSINOPHIL # BLD AUTO: 0.13 10*3/MM3 (ref 0–0.4)
EOSINOPHIL NFR BLD AUTO: 1.7 % (ref 0.3–6.2)
ERYTHROCYTE [DISTWIDTH] IN BLOOD BY AUTOMATED COUNT: 13.5 % (ref 12.3–15.4)
GLOBULIN SER CALC-MCNC: 2.6 GM/DL
GLUCOSE SERPL-MCNC: 97 MG/DL (ref 65–99)
HCT VFR BLD AUTO: 47.4 % (ref 37.5–51)
HGB BLD-MCNC: 15.4 G/DL (ref 13–17.7)
IMM GRANULOCYTES # BLD AUTO: 0.03 10*3/MM3 (ref 0–0.05)
IMM GRANULOCYTES NFR BLD AUTO: 0.4 % (ref 0–0.5)
LYMPHOCYTES # BLD AUTO: 1.76 10*3/MM3 (ref 0.7–3.1)
LYMPHOCYTES NFR BLD AUTO: 22.4 % (ref 19.6–45.3)
MCH RBC QN AUTO: 30.4 PG (ref 26.6–33)
MCHC RBC AUTO-ENTMCNC: 32.5 G/DL (ref 31.5–35.7)
MCV RBC AUTO: 93.7 FL (ref 79–97)
MONOCYTES # BLD AUTO: 0.8 10*3/MM3 (ref 0.1–0.9)
MONOCYTES NFR BLD AUTO: 10.2 % (ref 5–12)
NEUTROPHILS # BLD AUTO: 5.08 10*3/MM3 (ref 1.7–7)
NEUTROPHILS NFR BLD AUTO: 64.7 % (ref 42.7–76)
NRBC BLD AUTO-RTO: 0 /100 WBC (ref 0–0.2)
PLATELET # BLD AUTO: 212 10*3/MM3 (ref 140–450)
POTASSIUM SERPL-SCNC: 4.6 MMOL/L (ref 3.5–5.2)
PROT SERPL-MCNC: 7 G/DL (ref 6–8.5)
RBC # BLD AUTO: 5.06 10*6/MM3 (ref 4.14–5.8)
SODIUM SERPL-SCNC: 144 MMOL/L (ref 136–145)
TSH SERPL DL<=0.005 MIU/L-ACNC: 1.01 UIU/ML (ref 0.27–4.2)
WBC # BLD AUTO: 7.85 10*3/MM3 (ref 3.4–10.8)

## 2021-09-22 ENCOUNTER — PATIENT MESSAGE (OUTPATIENT)
Dept: FAMILY MEDICINE CLINIC | Facility: CLINIC | Age: 66
End: 2021-09-22

## 2021-09-22 NOTE — TELEPHONE ENCOUNTER
From: Tesfaye Smith  To: Gina Hung DO  Sent: 9/22/2021 10:07 AM EDT  Subject: Non-Urgent Medical Question    I had a flu HD and pneumonia shot 23 at [facility] on Sept 13.

## 2021-11-09 DIAGNOSIS — E78.2 MIXED HYPERLIPIDEMIA: ICD-10-CM

## 2021-11-09 DIAGNOSIS — I10 ACCELERATED HYPERTENSION: ICD-10-CM

## 2021-11-09 DIAGNOSIS — M19.90 ARTHRITIS: ICD-10-CM

## 2021-11-09 RX ORDER — MELOXICAM 15 MG/1
15 TABLET ORAL DAILY
Qty: 30 TABLET | Refills: 0 | Status: SHIPPED | OUTPATIENT
Start: 2021-11-09 | End: 2021-11-10 | Stop reason: SDUPTHER

## 2021-11-09 RX ORDER — ATORVASTATIN CALCIUM 10 MG/1
10 TABLET, FILM COATED ORAL DAILY
Qty: 90 TABLET | Refills: 0 | Status: SHIPPED | OUTPATIENT
Start: 2021-11-09 | End: 2021-11-10 | Stop reason: SDUPTHER

## 2021-11-09 RX ORDER — AMLODIPINE BESYLATE 5 MG/1
5 TABLET ORAL DAILY
Qty: 90 TABLET | Refills: 0 | Status: SHIPPED | OUTPATIENT
Start: 2021-11-09 | End: 2021-11-10 | Stop reason: SDUPTHER

## 2021-11-10 ENCOUNTER — OFFICE VISIT (OUTPATIENT)
Dept: FAMILY MEDICINE CLINIC | Facility: CLINIC | Age: 66
End: 2021-11-10

## 2021-11-10 VITALS
OXYGEN SATURATION: 98 % | HEIGHT: 72 IN | WEIGHT: 194.2 LBS | BODY MASS INDEX: 26.3 KG/M2 | SYSTOLIC BLOOD PRESSURE: 122 MMHG | TEMPERATURE: 98.4 F | HEART RATE: 74 BPM | DIASTOLIC BLOOD PRESSURE: 64 MMHG

## 2021-11-10 DIAGNOSIS — K21.9 GASTROESOPHAGEAL REFLUX DISEASE, UNSPECIFIED WHETHER ESOPHAGITIS PRESENT: ICD-10-CM

## 2021-11-10 DIAGNOSIS — M19.90 ARTHRITIS: ICD-10-CM

## 2021-11-10 DIAGNOSIS — I10 ESSENTIAL HYPERTENSION: ICD-10-CM

## 2021-11-10 DIAGNOSIS — I10 ACCELERATED HYPERTENSION: ICD-10-CM

## 2021-11-10 DIAGNOSIS — K21.9 GASTROESOPHAGEAL REFLUX DISEASE: ICD-10-CM

## 2021-11-10 DIAGNOSIS — Z79.899 ENCOUNTER FOR LONG-TERM (CURRENT) USE OF MEDICATIONS: ICD-10-CM

## 2021-11-10 DIAGNOSIS — E78.2 MIXED HYPERLIPIDEMIA: Primary | ICD-10-CM

## 2021-11-10 PROCEDURE — 99214 OFFICE O/P EST MOD 30 MIN: CPT | Performed by: FAMILY MEDICINE

## 2021-11-10 RX ORDER — AMLODIPINE BESYLATE 5 MG/1
5 TABLET ORAL DAILY
Qty: 90 TABLET | Refills: 1 | Status: SHIPPED | OUTPATIENT
Start: 2021-11-10 | End: 2022-03-11 | Stop reason: SDUPTHER

## 2021-11-10 RX ORDER — MELOXICAM 15 MG/1
15 TABLET ORAL DAILY
Qty: 30 TABLET | Refills: 1 | Status: SHIPPED | OUTPATIENT
Start: 2021-11-10 | End: 2022-02-07 | Stop reason: SDUPTHER

## 2021-11-10 RX ORDER — ATORVASTATIN CALCIUM 10 MG/1
10 TABLET, FILM COATED ORAL DAILY
Qty: 90 TABLET | Refills: 1 | Status: SHIPPED | OUTPATIENT
Start: 2021-11-10 | End: 2022-03-11 | Stop reason: SDUPTHER

## 2021-11-10 RX ORDER — LISINOPRIL AND HYDROCHLOROTHIAZIDE 25; 20 MG/1; MG/1
1 TABLET ORAL DAILY
Qty: 90 TABLET | Refills: 1 | Status: SHIPPED | OUTPATIENT
Start: 2021-11-10 | End: 2022-02-07 | Stop reason: SDUPTHER

## 2021-11-10 RX ORDER — ESOMEPRAZOLE MAGNESIUM 40 MG/1
40 CAPSULE, DELAYED RELEASE ORAL
Qty: 90 CAPSULE | Refills: 1 | Status: SHIPPED | OUTPATIENT
Start: 2021-11-10 | End: 2022-05-28

## 2021-11-10 NOTE — PROGRESS NOTES
"Chief Complaint  Hypertension    Subjective          Tesfaye Smith presents to [facility]  Patient is here to follow-up on his chronic health conditions:    Hypertension.  The patient takes lisinopril hydrochlorothiazide 20/25 once daily and amlodipine 5 mg daily.  He was just started on the amlodipine a few months ago for some elevated blood pressure readings at that time.  He states that his blood pressure is under good control now at home.  The patient denies ever having a stress test and has never been on a cholesterol-lowering medication.    Hyperlipidemia.  ASCVD risk was 12.4% on lipids from March 2020.  He is taking atorvastatin 10 mg daily.  The patient has not noticed any side effects of the medication.  He has been taking it every day as directed.  Cholesterol was at goal.      GERD.  The patient takes Nexium 40 mg daily.  He cannot remember the last occurrence of heartburn.  He states that he does not have any side effects of that medicine.  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  He is also followed by a GI specialist.    Arthritis.  The patient states that he suffers from pain in his knees and hips intermittently.  He intermittently takes meloxicam 15 mg daily.  He denies any side effects.      Objective   Vital Signs:   /64   Pulse 74   Temp 98.4 °F (36.9 °C)   Ht 182.9 cm (72\")   Wt 88.1 kg (194 lb 3.2 oz)   SpO2 98%   BMI 26.34 kg/m²     Physical Exam  Vitals and nursing note reviewed.   Constitutional:       Appearance: Normal appearance. He is well-developed and normal weight.   HENT:      Head: Normocephalic and atraumatic.   Eyes:      Conjunctiva/sclera: Conjunctivae normal.   Cardiovascular:      Rate and Rhythm: Normal rate and regular rhythm.      Heart sounds: Normal heart sounds.   Pulmonary:      Effort: Pulmonary effort is normal.      Breath sounds: Normal breath sounds.   Abdominal:      General: " Bowel sounds are normal.      Palpations: Abdomen is soft.   Musculoskeletal:         General: Normal range of motion.      Cervical back: Normal range of motion and neck supple.   Skin:     General: Skin is warm and dry.      Capillary Refill: Capillary refill takes less than 2 seconds.      Findings: No rash.   Neurological:      Mental Status: He is alert and oriented to person, place, and time.   Psychiatric:         Behavior: Behavior normal.         Thought Content: Thought content normal.         Judgment: Judgment normal.        Result Review :   The following data was reviewed by: Gina Hung DO on 11/10/2021:  Common labs    Common Labsle 12/30/20 4/6/21 4/6/21 8/10/21 8/10/21     1045 1045 0821 0821   Glucose 96  86  97   BUN 17  15  19   Creatinine 1.01  1.01  1.24   eGFR Non  Am 74  74  58 (A)   eGFR African Am 89  89  70   Sodium 141  143  144   Potassium 4.5  4.6  4.6   Chloride 102  101  102   Calcium 9.6  9.5  10.1   Total Protein 6.8  6.8  7.0   Albumin 4.50  4.40  4.40   Total Bilirubin 0.5  0.6  0.4   Alkaline Phosphatase 81  90  77   AST (SGOT) 18  18  20   ALT (SGPT) 19  18  20   WBC    7.85    Hemoglobin    15.4    Hematocrit    47.4    Platelets    212    Total Cholesterol  139      Triglycerides  48      HDL Cholesterol  72 (A)      LDL Cholesterol   56      (A) Abnormal value       Comments are available for some flowsheets but are not being displayed.           TSH    TSH 11/16/20 8/10/21   TSH 0.612 1.010                     Assessment and Plan    Diagnoses and all orders for this visit:    1. Mixed hyperlipidemia (Primary)  -     atorvastatin (LIPITOR) 10 MG tablet; Take 1 tablet by mouth Daily.  Dispense: 90 tablet; Refill: 1    2. Essential hypertension  -     Comprehensive Metabolic Panel  -     lisinopril-hydrochlorothiazide (PRINZIDE,ZESTORETIC) 20-25 MG per tablet; Take 1 tablet by mouth Daily.  Dispense: 90 tablet; Refill: 1    3. Arthritis  -     meloxicam (MOBIC) 15  MG tablet; Take 1 tablet by mouth Daily.  Dispense: 30 tablet; Refill: 1    4. Gastroesophageal reflux disease, unspecified whether esophagitis present  -     CBC & Differential    5. Encounter for long-term (current) use of medications  -     CBC & Differential  -     Comprehensive Metabolic Panel    6. Gastroesophageal reflux disease  -     esomeprazole (nexIUM) 40 MG capsule; Take 1 capsule by mouth Every Morning Before Breakfast.  Dispense: 90 capsule; Refill: 1    7. Accelerated hypertension  -     amLODIPine (NORVASC) 5 MG tablet; Take 1 tablet by mouth Daily.  Dispense: 90 tablet; Refill: 1    Patient is here today to follow-up on chronic stable hypertension, hyperlipidemia, arthritis and reflux.  Surveillance labs were obtained today and any medication changes will be made based on lab results and will be called to the patient later this week.      Follow Up   Return in about 6 months (around 5/10/2022) for Medicare Wellness, HTN.  Patient was given instructions and counseling regarding his condition or for health maintenance advice. Please see specific information pulled into the AVS if appropriate.

## 2021-11-11 LAB
ALBUMIN SERPL-MCNC: 4.4 G/DL (ref 3.8–4.8)
ALBUMIN/GLOB SERPL: 2.1 {RATIO} (ref 1.2–2.2)
ALP SERPL-CCNC: 81 IU/L (ref 44–121)
ALT SERPL-CCNC: 24 IU/L (ref 0–44)
AST SERPL-CCNC: 23 IU/L (ref 0–40)
BASOPHILS # BLD AUTO: 0.1 X10E3/UL (ref 0–0.2)
BASOPHILS NFR BLD AUTO: 1 %
BILIRUB SERPL-MCNC: 0.7 MG/DL (ref 0–1.2)
BUN SERPL-MCNC: 24 MG/DL (ref 8–27)
BUN/CREAT SERPL: 23 (ref 10–24)
CALCIUM SERPL-MCNC: 9.3 MG/DL (ref 8.6–10.2)
CHLORIDE SERPL-SCNC: 103 MMOL/L (ref 96–106)
CO2 SERPL-SCNC: 25 MMOL/L (ref 20–29)
CREAT SERPL-MCNC: 1.06 MG/DL (ref 0.76–1.27)
EOSINOPHIL # BLD AUTO: 0.1 X10E3/UL (ref 0–0.4)
EOSINOPHIL NFR BLD AUTO: 1 %
ERYTHROCYTE [DISTWIDTH] IN BLOOD BY AUTOMATED COUNT: 12.9 % (ref 11.6–15.4)
GLOBULIN SER CALC-MCNC: 2.1 G/DL (ref 1.5–4.5)
GLUCOSE SERPL-MCNC: 82 MG/DL (ref 65–99)
HCT VFR BLD AUTO: 44 % (ref 37.5–51)
HGB BLD-MCNC: 14.9 G/DL (ref 13–17.7)
IMM GRANULOCYTES # BLD AUTO: 0 X10E3/UL (ref 0–0.1)
IMM GRANULOCYTES NFR BLD AUTO: 0 %
LYMPHOCYTES # BLD AUTO: 1.9 X10E3/UL (ref 0.7–3.1)
LYMPHOCYTES NFR BLD AUTO: 26 %
MCH RBC QN AUTO: 30.8 PG (ref 26.6–33)
MCHC RBC AUTO-ENTMCNC: 33.9 G/DL (ref 31.5–35.7)
MCV RBC AUTO: 91 FL (ref 79–97)
MONOCYTES # BLD AUTO: 0.6 X10E3/UL (ref 0.1–0.9)
MONOCYTES NFR BLD AUTO: 8 %
NEUTROPHILS # BLD AUTO: 4.7 X10E3/UL (ref 1.4–7)
NEUTROPHILS NFR BLD AUTO: 64 %
PLATELET # BLD AUTO: 202 X10E3/UL (ref 150–450)
POTASSIUM SERPL-SCNC: 4.1 MMOL/L (ref 3.5–5.2)
PROT SERPL-MCNC: 6.5 G/DL (ref 6–8.5)
RBC # BLD AUTO: 4.83 X10E6/UL (ref 4.14–5.8)
SODIUM SERPL-SCNC: 143 MMOL/L (ref 134–144)
WBC # BLD AUTO: 7.4 X10E3/UL (ref 3.4–10.8)

## 2022-02-04 ENCOUNTER — PATIENT MESSAGE (OUTPATIENT)
Dept: FAMILY MEDICINE CLINIC | Facility: CLINIC | Age: 67
End: 2022-02-04

## 2022-02-04 DIAGNOSIS — I10 ESSENTIAL HYPERTENSION: ICD-10-CM

## 2022-02-04 DIAGNOSIS — M19.90 ARTHRITIS: ICD-10-CM

## 2022-02-07 RX ORDER — LISINOPRIL AND HYDROCHLOROTHIAZIDE 25; 20 MG/1; MG/1
1 TABLET ORAL DAILY
Qty: 90 TABLET | Refills: 2 | Status: SHIPPED | OUTPATIENT
Start: 2022-02-07 | End: 2022-05-23

## 2022-02-07 RX ORDER — MELOXICAM 15 MG/1
15 TABLET ORAL DAILY
Qty: 90 TABLET | Refills: 2 | Status: SHIPPED | OUTPATIENT
Start: 2022-02-07 | End: 2022-05-10

## 2022-02-07 NOTE — TELEPHONE ENCOUNTER
From: Tesfaye Smith  To: Gina Hung DO  Sent: 2/4/2022 10:44 AM EST  Subject: New Prescriptions    I changed my prescription provider to [facility]. I change my profile to include the allowed providers in [employer] information. I requested a new prescription for Lisinopril  and Meloxicam to the [facility] mail order account which is the preferred supplier. They will contact you using this system I hope. During my last visit you recommend when I need Esomeprazole I fill it using [facility]. I don't know if they use mail order or just the local [facility]. Any thoughts you have to help me along would be great. Neither prescription is ugrgent.     Thanks John

## 2022-03-11 ENCOUNTER — PATIENT MESSAGE (OUTPATIENT)
Dept: FAMILY MEDICINE CLINIC | Facility: CLINIC | Age: 67
End: 2022-03-11

## 2022-03-11 DIAGNOSIS — E78.2 MIXED HYPERLIPIDEMIA: ICD-10-CM

## 2022-03-11 DIAGNOSIS — I10 ACCELERATED HYPERTENSION: ICD-10-CM

## 2022-03-11 RX ORDER — ATORVASTATIN CALCIUM 10 MG/1
10 TABLET, FILM COATED ORAL DAILY
Qty: 90 TABLET | Refills: 1 | Status: SHIPPED | OUTPATIENT
Start: 2022-03-11 | End: 2022-05-28

## 2022-03-11 RX ORDER — AMLODIPINE BESYLATE 5 MG/1
5 TABLET ORAL DAILY
Qty: 90 TABLET | Refills: 1 | Status: SHIPPED | OUTPATIENT
Start: 2022-03-11 | End: 2022-05-10

## 2022-03-11 NOTE — TELEPHONE ENCOUNTER
From: Tesfaye Smith  To: Gina Hung DO  Sent: 3/11/2022 12:21 PM EST  Subject: New Prescription    I called [facility] and they told me that because I am a new member to [facility] that I would need you to send a new prescription to them for Amlodipine 5mg 1Tablet daily and the Atorvastatin 10Mg 1 Tablet daily both 90 day supply. They provided me a fax number of [phone].

## 2022-05-10 ENCOUNTER — HOSPITAL ENCOUNTER (OUTPATIENT)
Dept: CARDIOLOGY | Facility: HOSPITAL | Age: 67
Discharge: HOME | End: 2022-05-10

## 2022-05-10 ENCOUNTER — OFFICE VISIT (OUTPATIENT)
Dept: FAMILY MEDICINE CLINIC | Facility: CLINIC | Age: 67
End: 2022-05-10

## 2022-05-10 ENCOUNTER — TELEPHONE (OUTPATIENT)
Dept: CARDIOLOGY | Facility: CLINIC | Age: 67
End: 2022-05-10

## 2022-05-10 VITALS
DIASTOLIC BLOOD PRESSURE: 74 MMHG | BODY MASS INDEX: 27.02 KG/M2 | OXYGEN SATURATION: 98 % | HEIGHT: 71 IN | WEIGHT: 193 LBS | SYSTOLIC BLOOD PRESSURE: 122 MMHG | HEART RATE: 71 BPM | TEMPERATURE: 98.2 F

## 2022-05-10 VITALS
RESPIRATION RATE: 20 BRPM | DIASTOLIC BLOOD PRESSURE: 92 MMHG | HEART RATE: 114 BPM | OXYGEN SATURATION: 100 % | SYSTOLIC BLOOD PRESSURE: 121 MMHG

## 2022-05-10 DIAGNOSIS — M19.90 ARTHRITIS: ICD-10-CM

## 2022-05-10 DIAGNOSIS — I48.91 ATRIAL FIBRILLATION WITH RAPID VENTRICULAR RESPONSE: ICD-10-CM

## 2022-05-10 DIAGNOSIS — I10 ESSENTIAL HYPERTENSION: ICD-10-CM

## 2022-05-10 DIAGNOSIS — Z00.00 ENCOUNTER FOR SUBSEQUENT ANNUAL WELLNESS VISIT (AWV) IN MEDICARE PATIENT: ICD-10-CM

## 2022-05-10 DIAGNOSIS — R60.9 EDEMA, UNSPECIFIED TYPE: ICD-10-CM

## 2022-05-10 DIAGNOSIS — K21.9 GASTROESOPHAGEAL REFLUX DISEASE, UNSPECIFIED WHETHER ESOPHAGITIS PRESENT: ICD-10-CM

## 2022-05-10 DIAGNOSIS — E78.2 MIXED HYPERLIPIDEMIA: ICD-10-CM

## 2022-05-10 DIAGNOSIS — R06.02 SHORTNESS OF BREATH: ICD-10-CM

## 2022-05-10 DIAGNOSIS — I48.91 NEW ONSET A-FIB: Primary | ICD-10-CM

## 2022-05-10 DIAGNOSIS — I48.91 NEW ONSET ATRIAL FIBRILLATION: Primary | ICD-10-CM

## 2022-05-10 DIAGNOSIS — Z79.899 ENCOUNTER FOR LONG-TERM (CURRENT) USE OF MEDICATIONS: ICD-10-CM

## 2022-05-10 DIAGNOSIS — R94.31 ABNORMAL EKG: ICD-10-CM

## 2022-05-10 LAB
ALBUMIN SERPL-MCNC: 4.7 G/DL (ref 3.5–5.2)
ALBUMIN/GLOB SERPL: 1.8 G/DL
ALP SERPL-CCNC: 81 U/L (ref 39–117)
ALT SERPL W P-5'-P-CCNC: 24 U/L (ref 1–41)
ANION GAP SERPL CALCULATED.3IONS-SCNC: 14 MMOL/L (ref 5–15)
ASCENDING AORTA: 3.7 CM
AST SERPL-CCNC: 24 U/L (ref 1–40)
BASOPHILS # BLD AUTO: 0.05 10*3/MM3 (ref 0–0.2)
BASOPHILS NFR BLD AUTO: 0.7 % (ref 0–1.5)
BH CV ECHO MEAS - ACS: 1.99 CM
BH CV ECHO MEAS - AO MAX PG: 1.99 MMHG
BH CV ECHO MEAS - AO MEAN PG: 0.97 MMHG
BH CV ECHO MEAS - AO ROOT DIAM: 3.7 CM
BH CV ECHO MEAS - AO V2 MAX: 70.6 CM/SEC
BH CV ECHO MEAS - AO V2 VTI: 8.1 CM
BH CV ECHO MEAS - AVA(I,D): 3.3 CM2
BH CV ECHO MEAS - EDV(CUBED): 78.3 ML
BH CV ECHO MEAS - EDV(MOD-SP2): 141 ML
BH CV ECHO MEAS - EDV(MOD-SP4): 136 ML
BH CV ECHO MEAS - EF(MOD-BP): 33.7 %
BH CV ECHO MEAS - EF(MOD-SP2): 31.2 %
BH CV ECHO MEAS - EF(MOD-SP4): 33.8 %
BH CV ECHO MEAS - ESV(CUBED): 43 ML
BH CV ECHO MEAS - ESV(MOD-SP2): 97 ML
BH CV ECHO MEAS - ESV(MOD-SP4): 90 ML
BH CV ECHO MEAS - FS: 18.1 %
BH CV ECHO MEAS - IVS/LVPW: 0.98 CM
BH CV ECHO MEAS - IVSD: 1 CM
BH CV ECHO MEAS - LV DIASTOLIC VOL/BSA (35-75): 65.5 CM2
BH CV ECHO MEAS - LV MASS(C)D: 143.8 GRAMS
BH CV ECHO MEAS - LV MAX PG: 0.89 MMHG
BH CV ECHO MEAS - LV MEAN PG: 0.56 MMHG
BH CV ECHO MEAS - LV SYSTOLIC VOL/BSA (12-30): 43.3 CM2
BH CV ECHO MEAS - LV V1 MAX: 47.1 CM/SEC
BH CV ECHO MEAS - LV V1 VTI: 6.8 CM
BH CV ECHO MEAS - LVIDD: 4.3 CM
BH CV ECHO MEAS - LVIDS: 3.5 CM
BH CV ECHO MEAS - LVOT AREA: 3.8 CM2
BH CV ECHO MEAS - LVOT DIAM: 2.21 CM
BH CV ECHO MEAS - LVPWD: 1.02 CM
BH CV ECHO MEAS - MR MAX PG: 104.3 MMHG
BH CV ECHO MEAS - MR MAX VEL: 510.6 CM/SEC
BH CV ECHO MEAS - MV DEC SLOPE: 620.1 CM/SEC2
BH CV ECHO MEAS - MV DEC TIME: 0.14 MSEC
BH CV ECHO MEAS - MV E MAX VEL: 88.3 CM/SEC
BH CV ECHO MEAS - MV MAX PG: 5.3 MMHG
BH CV ECHO MEAS - MV MEAN PG: 1.84 MMHG
BH CV ECHO MEAS - MV P1/2T: 56.4 MSEC
BH CV ECHO MEAS - MV V2 VTI: 16 CM
BH CV ECHO MEAS - MVA(P1/2T): 3.9 CM2
BH CV ECHO MEAS - MVA(VTI): 1.64 CM2
BH CV ECHO MEAS - PA ACC TIME: 0.08 SEC
BH CV ECHO MEAS - PA PR(ACCEL): 43.3 MMHG
BH CV ECHO MEAS - PA V2 MAX: 51.4 CM/SEC
BH CV ECHO MEAS - QP/QS: 0.92
BH CV ECHO MEAS - RV MAX PG: 0.45 MMHG
BH CV ECHO MEAS - RV V1 MAX: 33.4 CM/SEC
BH CV ECHO MEAS - RV V1 VTI: 5.1 CM
BH CV ECHO MEAS - RVOT DIAM: 2.45 CM
BH CV ECHO MEAS - SI(MOD-SP2): 21.2 ML/M2
BH CV ECHO MEAS - SI(MOD-SP4): 22.1 ML/M2
BH CV ECHO MEAS - SV(LVOT): 26.2 ML
BH CV ECHO MEAS - SV(MOD-SP2): 44 ML
BH CV ECHO MEAS - SV(MOD-SP4): 46 ML
BH CV ECHO MEAS - SV(RVOT): 24 ML
BH CV ECHO MEAS - TR MAX PG: 32.3 MMHG
BH CV ECHO MEAS - TR MAX VEL: 284 CM/SEC
BH CV XLRA - RV BASE: 3.6 CM
BH CV XLRA - RV LENGTH: 7 CM
BH CV XLRA - RV MID: 3 CM
BILIRUB SERPL-MCNC: 1 MG/DL (ref 0–1.2)
BUN SERPL-MCNC: 22 MG/DL (ref 8–23)
BUN/CREAT SERPL: 19.1 (ref 7–25)
CALCIUM SPEC-SCNC: 9.6 MG/DL (ref 8.6–10.5)
CHLORIDE SERPL-SCNC: 103 MMOL/L (ref 98–107)
CO2 SERPL-SCNC: 25 MMOL/L (ref 22–29)
CREAT SERPL-MCNC: 1.15 MG/DL (ref 0.76–1.27)
D DIMER PPP FEU-MCNC: 0.35 MCGFEU/ML (ref 0–0.49)
DEPRECATED RDW RBC AUTO: 46.6 FL (ref 37–54)
EGFRCR SERPLBLD CKD-EPI 2021: 69.3 ML/MIN/1.73
EOSINOPHIL # BLD AUTO: 0.07 10*3/MM3 (ref 0–0.4)
EOSINOPHIL NFR BLD AUTO: 0.9 % (ref 0.3–6.2)
ERYTHROCYTE [DISTWIDTH] IN BLOOD BY AUTOMATED COUNT: 13.8 % (ref 12.3–15.4)
GLOBULIN UR ELPH-MCNC: 2.6 GM/DL
GLUCOSE SERPL-MCNC: 96 MG/DL (ref 65–99)
HCT VFR BLD AUTO: 44.2 % (ref 37.5–51)
HGB BLD-MCNC: 14.9 G/DL (ref 13–17.7)
IMM GRANULOCYTES # BLD AUTO: 0.02 10*3/MM3 (ref 0–0.05)
IMM GRANULOCYTES NFR BLD AUTO: 0.3 % (ref 0–0.5)
LEFT ATRIUM VOLUME INDEX: 38.7 ML/M2
LYMPHOCYTES # BLD AUTO: 1.83 10*3/MM3 (ref 0.7–3.1)
LYMPHOCYTES NFR BLD AUTO: 23.8 % (ref 19.6–45.3)
MAXIMAL PREDICTED HEART RATE: 152 BPM
MCH RBC QN AUTO: 31 PG (ref 26.6–33)
MCHC RBC AUTO-ENTMCNC: 33.7 G/DL (ref 31.5–35.7)
MCV RBC AUTO: 91.9 FL (ref 79–97)
MONOCYTES # BLD AUTO: 0.69 10*3/MM3 (ref 0.1–0.9)
MONOCYTES NFR BLD AUTO: 9 % (ref 5–12)
NEUTROPHILS NFR BLD AUTO: 5.02 10*3/MM3 (ref 1.7–7)
NEUTROPHILS NFR BLD AUTO: 65.3 % (ref 42.7–76)
NRBC BLD AUTO-RTO: 0 /100 WBC (ref 0–0.2)
NT-PROBNP SERPL-MCNC: 1758 PG/ML (ref 0–900)
PLATELET # BLD AUTO: 156 10*3/MM3 (ref 140–450)
PMV BLD AUTO: 12.9 FL (ref 6–12)
POTASSIUM SERPL-SCNC: 3.7 MMOL/L (ref 3.5–5.2)
PROT SERPL-MCNC: 7.3 G/DL (ref 6–8.5)
RBC # BLD AUTO: 4.81 10*6/MM3 (ref 4.14–5.8)
SINUS: 3.6 CM
SODIUM SERPL-SCNC: 142 MMOL/L (ref 136–145)
STJ: 3.2 CM
STRESS TARGET HR: 129 BPM
T4 FREE SERPL-MCNC: 1.21 NG/DL (ref 0.93–1.7)
TROPONIN T SERPL-MCNC: <0.01 NG/ML (ref 0–0.03)
TSH SERPL DL<=0.05 MIU/L-ACNC: 0.63 UIU/ML (ref 0.27–4.2)
WBC NRBC COR # BLD: 7.68 10*3/MM3 (ref 3.4–10.8)

## 2022-05-10 PROCEDURE — 94760 N-INVAS EAR/PLS OXIMETRY 1: CPT

## 2022-05-10 PROCEDURE — 25010000002 PERFLUTREN (DEFINITY) 8.476 MG IN SODIUM CHLORIDE (PF) 0.9 % 10 ML INJECTION: Performed by: INTERNAL MEDICINE

## 2022-05-10 PROCEDURE — 96375 TX/PRO/DX INJ NEW DRUG ADDON: CPT

## 2022-05-10 PROCEDURE — 84443 ASSAY THYROID STIM HORMONE: CPT

## 2022-05-10 PROCEDURE — 93306 TTE W/DOPPLER COMPLETE: CPT

## 2022-05-10 PROCEDURE — G0439 PPPS, SUBSEQ VISIT: HCPCS | Performed by: FAMILY MEDICINE

## 2022-05-10 PROCEDURE — 25010000002 DIGOXIN PER 500 MCG: Performed by: INTERNAL MEDICINE

## 2022-05-10 PROCEDURE — 1170F FXNL STATUS ASSESSED: CPT | Performed by: FAMILY MEDICINE

## 2022-05-10 PROCEDURE — 93306 TTE W/DOPPLER COMPLETE: CPT | Performed by: INTERNAL MEDICINE

## 2022-05-10 PROCEDURE — 83880 ASSAY OF NATRIURETIC PEPTIDE: CPT | Performed by: INTERNAL MEDICINE

## 2022-05-10 PROCEDURE — 99214 OFFICE O/P EST MOD 30 MIN: CPT | Performed by: FAMILY MEDICINE

## 2022-05-10 PROCEDURE — 36415 COLL VENOUS BLD VENIPUNCTURE: CPT

## 2022-05-10 PROCEDURE — 96374 THER/PROPH/DIAG INJ IV PUSH: CPT

## 2022-05-10 PROCEDURE — 96376 TX/PRO/DX INJ SAME DRUG ADON: CPT

## 2022-05-10 PROCEDURE — 93000 ELECTROCARDIOGRAM COMPLETE: CPT | Performed by: FAMILY MEDICINE

## 2022-05-10 PROCEDURE — 99204 OFFICE O/P NEW MOD 45 MIN: CPT | Performed by: INTERNAL MEDICINE

## 2022-05-10 PROCEDURE — 85025 COMPLETE CBC W/AUTO DIFF WBC: CPT

## 2022-05-10 PROCEDURE — 1159F MED LIST DOCD IN RCRD: CPT | Performed by: FAMILY MEDICINE

## 2022-05-10 PROCEDURE — 85379 FIBRIN DEGRADATION QUANT: CPT | Performed by: INTERNAL MEDICINE

## 2022-05-10 PROCEDURE — 84484 ASSAY OF TROPONIN QUANT: CPT | Performed by: INTERNAL MEDICINE

## 2022-05-10 PROCEDURE — 84439 ASSAY OF FREE THYROXINE: CPT

## 2022-05-10 PROCEDURE — 80053 COMPREHEN METABOLIC PANEL: CPT

## 2022-05-10 RX ORDER — NITROGLYCERIN 0.4 MG/1
0.4 TABLET SUBLINGUAL
Status: DISCONTINUED | OUTPATIENT
Start: 2022-05-10 | End: 2022-06-01 | Stop reason: HOSPADM

## 2022-05-10 RX ORDER — SODIUM CHLORIDE 0.9 % (FLUSH) 0.9 %
10 SYRINGE (ML) INJECTION AS NEEDED
Status: DISCONTINUED | OUTPATIENT
Start: 2022-05-10 | End: 2022-06-01 | Stop reason: HOSPADM

## 2022-05-10 RX ORDER — METOPROLOL TARTRATE 5 MG/5ML
5 INJECTION INTRAVENOUS
Status: DISCONTINUED | OUTPATIENT
Start: 2022-05-10 | End: 2022-05-11 | Stop reason: HOSPADM

## 2022-05-10 RX ORDER — DIGOXIN 0.25 MG/ML
500 INJECTION INTRAMUSCULAR; INTRAVENOUS ONCE
Status: COMPLETED | OUTPATIENT
Start: 2022-05-10 | End: 2022-05-10

## 2022-05-10 RX ORDER — METOPROLOL TARTRATE 50 MG/1
50 TABLET, FILM COATED ORAL 2 TIMES DAILY
Qty: 60 TABLET | Refills: 1 | Status: SHIPPED | OUTPATIENT
Start: 2022-05-10 | End: 2022-05-17 | Stop reason: SDUPTHER

## 2022-05-10 RX ADMIN — DIGOXIN 500 MCG: 0.25 INJECTION INTRAMUSCULAR; INTRAVENOUS at 12:50

## 2022-05-10 RX ADMIN — METOROPROLOL TARTRATE 5 MG: 5 INJECTION, SOLUTION INTRAVENOUS at 13:01

## 2022-05-10 RX ADMIN — PERFLUTREN 1.5 ML: 6.52 INJECTION, SUSPENSION INTRAVENOUS at 13:22

## 2022-05-10 RX ADMIN — METOROPROLOL TARTRATE 5 MG: 5 INJECTION, SOLUTION INTRAVENOUS at 12:45

## 2022-05-10 NOTE — TELEPHONE ENCOUNTER
Patient called and stated that the eliquis is going to cost $500/month.  Advised the patient to call and see what is covered on insurance and let us know at the next appointment.  Also let the patient know that we can apply for patient assistance.  Patient stated that he would call and see what he can find out and let us know at the appointment.    Deborah

## 2022-05-10 NOTE — PROGRESS NOTES
"The ABCs of the Annual Wellness Visit  Subsequent Medicare Wellness Visit    Chief Complaint   Patient presents with   • Medicare Wellness-subsequent   • Hypertension      Patient is also here to follow-up on his chronic health conditions:    Hypertension.  The patient takes lisinopril hydrochlorothiazide 20/25 once daily and amlodipine 5 mg daily.  He was just started on the amlodipine a few months ago for some elevated blood pressure readings at that time.  He states that his blood pressure is under good control now at home.  The patient denies ever having a stress test and has never been on a cholesterol-lowering medication.    Hyperlipidemia.  ASCVD risk was 12.4% on lipids from March 2020.  He is taking atorvastatin 10 mg daily.  The patient has not noticed any side effects of the medication.  He has been taking it every day as directed.  Cholesterol was at goal.      GERD.  The patient takes Nexium 40 mg daily.  He cannot remember the last occurrence of heartburn.  He states that he does not have any side effects of that medicine.  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  He is also followed by a GI specialist.    Arthritis.  The patient states that he suffers from pain in his knees and hips intermittently.  He intermittently takes meloxicam 15 mg daily.  He denies any side effects.    Patient is also here for new problem.  A couple of weeks ago he first started noticing that his heart felt like it was \"out of rhythm\".  Patient states that he feels anxious when it happens.  He had never had that sensation before.  He cannot think a of a specific time it occurred but just a gradual occurrence.  Patient denies any chest pain but he does state that he has had some intermittent episodes of shortness of breath with activity.  He has also noticed bilateral lower extremity ankle swelling for the past couple weeks as well.  Patient denies any pain in his feet or " ankles.    Subjective    History of Present Illness:  Tesfaye Smith is a 66 y.o. male who presents for a Subsequent Medicare Wellness Visit.    The following portions of the patient's history were reviewed and   updated as appropriate: allergies, current medications, past family history, past medical history, past social history, past surgical history and problem list.    Compared to one year ago, the patient feels his physical   health is the same.    Compared to one year ago, the patient feels his mental   health is the same.    Recent Hospitalizations:  He was not admitted to the hospital during the last year.       Current Medical Providers:  Patient Care Team:  Gina Hung DO as PCP - General (Family Medicine)    Outpatient Medications Prior to Visit   Medication Sig Dispense Refill   • amLODIPine (NORVASC) 5 MG tablet Take 1 tablet by mouth Daily. 90 tablet 1   • atorvastatin (LIPITOR) 10 MG tablet Take 1 tablet by mouth Daily. 90 tablet 1   • esomeprazole (nexIUM) 40 MG capsule Take 1 capsule by mouth Every Morning Before Breakfast. 90 capsule 1   • lisinopril-hydrochlorothiazide (PRINZIDE,ZESTORETIC) 20-25 MG per tablet Take 1 tablet by mouth Daily. 90 tablet 2   • meloxicam (MOBIC) 15 MG tablet Take 1 tablet by mouth Daily. 90 tablet 2   • Multiple Vitamins-Minerals (CENTRUM SILVER ADULT 50+) tablet Take 1 tablet by mouth Daily.     • Cetirizine HCl 10 MG capsule Take 1 tablet by mouth As Needed.     • acetaminophen (TYLENOL) 500 MG tablet Take 1 tablet by mouth Every 6 (Six) Hours As Needed for Mild Pain  or Moderate Pain .       No facility-administered medications prior to visit.       No opioid medication identified on active medication list. I have reviewed chart for other potential  high risk medication/s and harmful drug interactions in the elderly.          Aspirin is not on active medication list.  Aspirin use is not indicated based on review of current medical condition/s. Risk of harm  "outweighs potential benefits.  .    Patient Active Problem List   Diagnosis   • Eczema   • Accelerated hypertension   • Acquired deformity of right foot   • Hallux valgus   • Metatarsalgia of right foot   • Gastroesophageal reflux disease   • Bilateral hip pain   • Abnormal CBC   • Almaraz's esophagus     Advance Care Planning  Advance Directive is not on file.  ACP discussion was held with the patient during this visit. Patient does not have an advance directive, information provided.          Objective    Vitals:    05/10/22 0840   BP: 122/74   Pulse: 71   Temp: 98.2 °F (36.8 °C)   SpO2: 98%   Weight: 87.5 kg (193 lb)   Height: 179.1 cm (70.5\")     BMI Readings from Last 1 Encounters:   05/10/22 27.30 kg/m²   BMI is above normal parameters. Recommendations include: educational material, exercise counseling and nutrition counseling    Does the patient have evidence of cognitive impairment? No    Physical Exam  Vitals and nursing note reviewed.   Constitutional:       Appearance: Normal appearance. He is well-developed.   HENT:      Head: Normocephalic and atraumatic.   Eyes:      General: No scleral icterus.     Conjunctiva/sclera: Conjunctivae normal.   Cardiovascular:      Rate and Rhythm: Normal rate and regular rhythm.      Heart sounds: Normal heart sounds.   Pulmonary:      Effort: Pulmonary effort is normal.      Breath sounds: Normal breath sounds.   Abdominal:      General: Bowel sounds are normal.      Palpations: Abdomen is soft.   Musculoskeletal:         General: Normal range of motion.      Cervical back: Normal range of motion and neck supple.      Right lower leg: No edema.      Left lower leg: No edema.   Skin:     General: Skin is warm and dry.      Capillary Refill: Capillary refill takes less than 2 seconds.      Findings: No rash.   Neurological:      Mental Status: He is alert and oriented to person, place, and time.   Psychiatric:         Mood and Affect: Mood normal.         Behavior: " Behavior normal.         Thought Content: Thought content normal.         Judgment: Judgment normal.               ECG 12 Lead    Date/Time: 5/10/2022 10:04 AM  Performed by: Gina Hung DO  Authorized by: Gina Hung DO   Comparison: compared with previous ECG from 9/30/2020  Comparison to previous ECG: Changed from prior which showed sinus arrhythmia but otherwise normal ECG on 9/30/2020  Rhythm: atrial fibrillation  Rate: tachycardic    Clinical impression: abnormal EKG            HEALTH RISK ASSESSMENT    Smoking Status:  Social History     Tobacco Use   Smoking Status Never Smoker   Smokeless Tobacco Never Used     Alcohol Consumption:  Social History     Substance and Sexual Activity   Alcohol Use Yes    Comment: occasional     Fall Risk Screen:    STEADI Fall Risk Assessment was completed, and patient is at LOW risk for falls.Assessment completed on:5/10/2022    Depression Screening:  PHQ-2/PHQ-9 Depression Screening 5/10/2022   Retired PHQ-9 Total Score -   Retired Total Score -   Little Interest or Pleasure in Doing Things 0-->not at all   Feeling Down, Depressed or Hopeless 0-->not at all   PHQ-9: Brief Depression Severity Measure Score 0       Health Habits and Functional and Cognitive Screening:  Functional & Cognitive Status 5/10/2022   Do you have difficulty preparing food and eating? No   Do you have difficulty bathing yourself, getting dressed or grooming yourself? No   Do you have difficulty using the toilet? No   Do you have difficulty moving around from place to place? No   Do you have trouble with steps or getting out of a bed or a chair? No   Current Diet Well Balanced Diet   Dental Exam Up to date   Eye Exam Not up to date   Exercise (times per week) 2 times per week   Current Exercises Include Walking   Current Exercise Activities Include -   Do you need help using the phone?  No   Are you deaf or do you have serious difficulty hearing?  Yes   Do you need help with transportation?  No   Do you need help shopping? No   Do you need help preparing meals?  No   Do you need help with housework?  No   Do you need help with laundry? No   Do you need help taking your medications? No   Do you need help managing money? No   Do you ever drive or ride in a car without wearing a seat belt? No   Have you felt unusual stress, anger or loneliness in the last month? -   Who do you live with? -   If you need help, do you have trouble finding someone available to you? -   Have you been bothered in the last four weeks by sexual problems? -   Do you have difficulty concentrating, remembering or making decisions? -       Age-appropriate Screening Schedule:  Refer to the list below for future screening recommendations based on patient's age, sex and/or medical conditions. Orders for these recommended tests are listed in the plan section. The patient has been provided with a written plan.    Health Maintenance   Topic Date Due   • LIPID PANEL  04/06/2022   • INFLUENZA VACCINE  08/01/2022   • TDAP/TD VACCINES (3 - Td or Tdap) 11/08/2029   • ZOSTER VACCINE  Completed              [unfilled]  CMS Preventative Services Quick Reference  Risk Factors Identified During Encounter  Immunizations Discussed/Encouraged (specific Immunizations; Pneumococcal 23, Prevnar 20 (Pneumococcal 20-valent conjugate), Vaxneuvance (Pneumococcal 15-valent conjugate), Shingrix and COVID19  Obesity/Overweight   The above risks/problems have been discussed with the patient.  Follow up actions/plans if indicated are seen below in the Assessment/Plan Section.  Pertinent information has been shared with the patient in the After Visit Summary.    Diagnoses and all orders for this visit:    1. New onset atrial fibrillation (HCC) (Primary)  -     Ambulatory Referral [facility]    2. Edema, unspecified type  -     Ambulatory Referral [facility]    3. Atrial fibrillation with rapid ventricular response (HCC)  -     ECG 12  Lead  -     Ambulatory Referral [facility]    4. Encounter for subsequent annual wellness visit (AWV) in Medicare patient    5. Essential hypertension    6. Mixed hyperlipidemia    7. Arthritis    8. Gastroesophageal reflux disease, unspecified whether esophagitis present    9. Encounter for long-term (current) use of medications      Patient is here today for his annual wellness exam and routine follow-up.  Clinically he was found to have an irregular rhythm of his heart and upon performing EKG he was found to be in rapid A. fib.  The patient is hemodynamically stable and only with minimal clinical complaints.  Therefore urgent referral to [facility] cardiology clinic today was obtained and patient will be driven by his wife directly there for further evaluation of new onset of rapid A. Fib.      Patient is also here to follow-up on chronic stable hypertension, hyperlipidemia, and GERD.    Routine health maintenance.  Patient is up-to-date with all screenings.    Follow Up:   Return for per cardiology evaluation today.     An After Visit Summary and PPPS were made available to the patient.

## 2022-05-10 NOTE — PROGRESS NOTES
Date of Office Visit: 05/10/2022  Encounter Provider: Katerina Pierre MD  Place of Service: [facility] CARDIOLOGY  Patient Name: Tesfaye Smith  :1955      Patient ID:  Tesfaye Smith is a 66 y.o. male is here for atrial fibrillation.           History of Present Illness    He was sent to [facility] for heart racing.    He noticed an episode when he was on spring break with his daughter, they were in the [facility], that he woke with his heart racing.  He never had this happen before.  Since that time, he has had increasing episodes of his heart racing.  Is intermittent during the day and will wake mild sleep at night.  When it wakes him up sleep at night, he is also very short winded and has to stop.  He says he feels very anxious.  He has had 2 weeks of increasing edema.  He has been on amlodipine for 2 years without edema until recently.  He says he just gets this very anxious feeling in his chest and short winded.  He has had no real chest tightness or pressure.  He has no dizziness or syncope.  His energy level has been low.  His wife says that he really does not snore.  He saw his PCP this morning and he was in atrial fibrillation with rapid ventricular response.  He has had no stroke, blood clot, cardiac issues.  He has never been diagnosed with a rhythm issue or thyroid problem.  He is actually been very healthy.    He does not smoke, he has rare alcohol and does not use illicit drugs.  There is a family history of strokes on his dad side.    ECG shows A. fib with RVR, no ST changes.  Labs today show proBNP 1758, D-dimer normal, CBC, CMP, thyroid panel and troponin normal.    Past Medical History:   Diagnosis Date   • Arthritis    • Atrial fibrillation (HCC)    • Almaraz's esophagus    • Bunion of right foot    • Dislocation, metatarsophalangeal 2016   • Eczema    • Esophageal reflux    • Hyperlipidemia    • Hypertension    • PONV (postoperative nausea and  vomiting)          Past Surgical History:   Procedure Laterality Date   • BUNIONECTOMY Right     RIGHT GREAT TOE   • FOOT FUSION Right 1/24/2017    Procedure: ARTHRODESIS first METATARSALPHALANGEAL JOINT with second metatarsophalageal joint release and second metatarsal osteotomy or resection and second hammertoe correction with proximal phalangeal joint resection of right foot;  Surgeon: Jagdeep Medrano MD;  Location: [facility] OR [facility] [place];  Service:    • HERNIA REPAIR         Current Outpatient Medications on File Prior to Encounter   Medication Sig Dispense Refill   • amLODIPine (NORVASC) 5 MG tablet Take 1 tablet by mouth Daily. 90 tablet 1   • atorvastatin (LIPITOR) 10 MG tablet Take 1 tablet by mouth Daily. 90 tablet 1   • esomeprazole (nexIUM) 40 MG capsule Take 1 capsule by mouth Every Morning Before Breakfast. 90 capsule 1   • lisinopril-hydrochlorothiazide (PRINZIDE,ZESTORETIC) 20-25 MG per tablet Take 1 tablet by mouth Daily. 90 tablet 2   • meloxicam (MOBIC) 15 MG tablet Take 1 tablet by mouth Daily. 90 tablet 2   • Multiple Vitamins-Minerals (CENTRUM SILVER ADULT 50+) tablet Take 1 tablet by mouth Daily.     • [DISCONTINUED] acetaminophen (TYLENOL) 500 MG tablet Take 1 tablet by mouth Every 6 (Six) Hours As Needed for Mild Pain  or Moderate Pain .     • [DISCONTINUED] Cetirizine HCl 10 MG capsule Take 1 tablet by mouth As Needed.       No current facility-administered medications on file prior to encounter.       Social History     Socioeconomic History   • Marital status:      Spouse name: Yajaira   • Number of children: 2   Tobacco Use   • Smoking status: Never Smoker   • Smokeless tobacco: Never Used   Vaping Use   • Vaping Use: Never used   Substance and Sexual Activity   • Alcohol use: Yes     Comment: occasional   • Drug use: No   • Sexual activity: Defer           ROS    Procedures  Procedures        Objective:      Vitals:    05/10/22 1213   BP: 130/84   BP Location: Right arm   Patient  Position: Sitting   Pulse: (!) 124   Resp: 20   SpO2: 100%     There is no height or weight on file to calculate BMI.    Vitals reviewed.   Constitutional:       General: Not in acute distress.     Appearance: Well-developed. Not diaphoretic.   Eyes:      General: No scleral icterus.     Conjunctiva/sclera: Conjunctivae normal.   HENT:      Head: Normocephalic and atraumatic.   Neck:      Thyroid: No thyromegaly.      Vascular: No carotid bruit or JVD.      Lymphadenopathy: No cervical adenopathy.   Pulmonary:      Effort: Pulmonary effort is normal. No respiratory distress.      Breath sounds: Normal breath sounds. No wheezing. No rhonchi. No rales.   Chest:      Chest wall: Not tender to palpatation.   Cardiovascular:      Tachycardia present. Irregularly irregular rhythm.      No gallop.   Pulses:     Intact distal pulses.   Edema:     Peripheral edema present.     Pretibial: bilateral 1+ edema of the pretibial area.     Ankle: bilateral 1+ edema of the ankle.  Abdominal:      General: Bowel sounds are normal. There is no distension or abdominal bruit.      Palpations: Abdomen is soft. There is no abdominal mass.      Tenderness: There is no abdominal tenderness.   Musculoskeletal:         General: No deformity.      Extremities: No clubbing present.     Cervical back: Neck supple. Skin:     General: Skin is warm and dry. There is no cyanosis.      Coloration: Skin is not pale.      Findings: No rash.   Neurological:      Mental Status: Alert and oriented to person, place, and time.      Cranial Nerves: No cranial nerve deficit.   Psychiatric:         Judgment: Judgment normal.         Lab Review:       Assessment:      Diagnosis Plan   1. New onset a-fib (HCC)   TSH+Free T4   2. Abnormal EKG  Cardiac Monitoring    Vital Signs - Once    Vital Signs - As Needed    Pulse Oximetry    Oxygen Therapy- Nasal Cannula; Titrate for SPO2: 92%, equal to or greater than    Insert Peripheral IV    sodium chloride 0.9 % flush  10 mL    nitroglycerin (NITROSTAT) SL tablet 0.4 mg    NPO Diet NPO Type: Strict NPO    Bathroom Privileges With Assistance    CBC & Differential    Comprehensive Metabolic Panel    Troponin T    D-Dimer    proBNP    Cardiac Monitoring    Vital Signs - Once    Insert Peripheral IV    NPO Diet NPO Type: Strict NPO    Bathroom Privileges With Assistance    Troponin T    Troponin T    D-Dimer    D-Dimer    proBNP    proBNP    TSH+Free T4    Adult Transthoracic Echo Complete W/ Cont if Necessary Per Protocol    metoprolol tartrate (LOPRESSOR) injection 5 mg   3. Shortness of breath  Cardiac Monitoring    Vital Signs - Once    Vital Signs - As Needed    Pulse Oximetry    Oxygen Therapy- Nasal Cannula; Titrate for SPO2: 92%, equal to or greater than    Insert Peripheral IV    sodium chloride 0.9 % flush 10 mL    nitroglycerin (NITROSTAT) SL tablet 0.4 mg    NPO Diet NPO Type: Strict NPO    Bathroom Privileges With Assistance    CBC & Differential    Comprehensive Metabolic Panel    Troponin T    D-Dimer    proBNP    Cardiac Monitoring    Vital Signs - Once    Insert Peripheral IV    NPO Diet NPO Type: Strict NPO    Bathroom Privileges With Assistance    Troponin T    Troponin T    D-Dimer    D-Dimer    proBNP    proBNP    TSH+Free T4    Adult Transthoracic Echo Complete W/ Cont if Necessary Per Protocol    metoprolol tartrate (LOPRESSOR) injection 5 mg     1. Atrial fibrillation with rapid ventricular response, IV metoprolol and digoxin today.  We will start eliquis 5mg bid.  His [id number] score is 3 giving him a 3.3% annual risk of stroke.  2. Orthopnea, this is due to heart failure due to atrial fibrillation.  Set up echocardiogram today.  3. Hypertension, typically well treated  4. Family history of stroke.     Plan:       Plan is as above. See aprn in 1 month

## 2022-05-17 ENCOUNTER — OFFICE VISIT (OUTPATIENT)
Dept: CARDIOLOGY | Facility: CLINIC | Age: 67
End: 2022-05-17

## 2022-05-17 VITALS
WEIGHT: 191.4 LBS | BODY MASS INDEX: 26.8 KG/M2 | HEART RATE: 113 BPM | SYSTOLIC BLOOD PRESSURE: 114 MMHG | DIASTOLIC BLOOD PRESSURE: 60 MMHG | HEIGHT: 71 IN

## 2022-05-17 DIAGNOSIS — I10 ACCELERATED HYPERTENSION: Chronic | ICD-10-CM

## 2022-05-17 DIAGNOSIS — I48.19 ATRIAL FIBRILLATION, PERSISTENT: Primary | ICD-10-CM

## 2022-05-17 DIAGNOSIS — I34.0 NONRHEUMATIC MITRAL VALVE REGURGITATION: ICD-10-CM

## 2022-05-17 DIAGNOSIS — I50.21 ACUTE SYSTOLIC CHF (CONGESTIVE HEART FAILURE): ICD-10-CM

## 2022-05-17 DIAGNOSIS — E78.2 MIXED HYPERLIPIDEMIA: ICD-10-CM

## 2022-05-17 DIAGNOSIS — K22.719 BARRETT'S ESOPHAGUS WITH DYSPLASIA: ICD-10-CM

## 2022-05-17 PROCEDURE — 93000 ELECTROCARDIOGRAM COMPLETE: CPT | Performed by: NURSE PRACTITIONER

## 2022-05-17 PROCEDURE — 99214 OFFICE O/P EST MOD 30 MIN: CPT | Performed by: NURSE PRACTITIONER

## 2022-05-17 RX ORDER — METOPROLOL TARTRATE 50 MG/1
50 TABLET, FILM COATED ORAL 3 TIMES DAILY
Qty: 270 TABLET | Refills: 3 | Status: SHIPPED | OUTPATIENT
Start: 2022-05-17 | End: 2022-05-28

## 2022-05-17 NOTE — PROGRESS NOTES
[facility] CARDIOLOGY  [address]  [facility] [address]  [facility] [address]  Phone: [phone]      Patient Name: Tesfaye Smith  :1955  Age: 66 y.o.  Primary Cardiologist: Katerina Pierre MD  Encounter Provider:  NERY Pagan      Chief Complaint     Atrial fibrillation     SUBJECTIVE     History of Present Illness:  Tesfaye Smith is a 66 y.o.  male whose medical history includes hypertension, Almaraz's esophagus, and hyperlipidemia. They are followed in our office by Dr. Pierre for atrial fibrillation. Below is a summary of pertinent cardiology findings:  · He [place] [facility] [place] for new onset atrial fibrillation.  He first noticed this while he was on spring break in the [facility] and the episodes were intermittent.  They were associated with a feeling of his heart racing, anxiety, and worsening dyspnea.  At that time he also had worsening edema over the last 2 weeks.  He was treated with IV metoprolol and IV digoxin.  He was started on 5 mg twice daily apixaban.  · Echocardiogram May 10, 2022 showed EF 33.7%, global hypokinesis of the left mildly increased left atrial volume, normal RVSP, mild to moderately dilated right atrial cavity, borderline dilated right ventricle, mildly reduced RV systolic function, and mild to moderate mitral valve regurgitation.    22 Follow-up:  He is here today will for 1 week follow-up and I am seeing him for the first time today.  ECG shows him to be in atrial fibrillation with heart rate 113.  He is still staying active at home but not pushing himself as he normally would.  During today he does not notice any symptoms of atrial fibrillation.  He denies any chest pain, dyspnea with exertion, palpitations, lightheadedness, or syncope.  He mostly notices the atrial fibrillation at night.  He is awoken with a feeling of his heart rate beating faster which makes him feel anxious.  He also reports  some orthopnea and shortness of breath; denies any chest pain at night.  Heart rate during the day is usually 60-70 with blood pressures of 110s to 120s over 60s.  His leg swelling has improved.  He is tolerating apixaban and denies any bleeding episodes; he has not missed any doses.      Past Medical History:   Diagnosis Date   • Arthritis    • Atrial fibrillation (HCC)    • Almaraz's esophagus    • Bunion of right foot    • Dislocation, metatarsophalangeal 09/07/2016   • Eczema    • Esophageal reflux    • Hyperlipidemia    • Hypertension    • PONV (postoperative nausea and vomiting)          Past Surgical History:   Procedure Laterality Date   • BUNIONECTOMY Right     RIGHT GREAT TOE   • FOOT FUSION Right 1/24/2017    Procedure: ARTHRODESIS first METATARSALPHALANGEAL JOINT with second metatarsophalageal joint release and second metatarsal osteotomy or resection and second hammertoe correction with proximal phalangeal joint resection of right foot;  Surgeon: Jagdeep Medrano MD;  [place] [facility] [place] [facility] [place]  Service:    • HERNIA REPAIR           Social History     Socioeconomic History   • Marital status:      Spouse name: Yajaira   • Number of children: 2   Tobacco Use   • Smoking status: Never Smoker   • Smokeless tobacco: Never Used   • Tobacco comment: Caffeine use: 2 cups daily   Vaping Use   • Vaping Use: Never used   Substance and Sexual Activity   • Alcohol use: Yes     Comment: occasional   • Drug use: No   • Sexual activity: Defer         Review of Systems     Review of Systems   Constitutional: Negative.   Cardiovascular: Positive for irregular heartbeat and orthopnea. Negative for chest pain, dyspnea on exertion, leg swelling and syncope.   Respiratory: Negative.    Hematologic/Lymphatic: Negative.    Psychiatric/Behavioral: The patient is nervous/anxious.          Medications     Allergies as of 05/17/2022   • (No Known Allergies)         Current Outpatient Medications:   •  apixaban  "(ELIQUIS) 5 MG tablet tablet, Take 1 tablet by mouth 2 (Two) Times a Day., Disp: 60 tablet, Rfl: 11  •  atorvastatin (LIPITOR) 10 MG tablet, Take 1 tablet by mouth Daily., Disp: 90 tablet, Rfl: 1  •  esomeprazole (nexIUM) 40 MG capsule, Take 1 capsule by mouth Every Morning Before Breakfast., Disp: 90 capsule, Rfl: 1  •  lisinopril-hydrochlorothiazide (PRINZIDE,ZESTORETIC) 20-25 MG per tablet, Take 1 tablet by mouth Daily., Disp: 90 tablet, Rfl: 2  •  metoprolol tartrate (LOPRESSOR) 50 MG tablet, Take 1 tablet by mouth 3 (Three) Times a Day., Disp: 270 tablet, Rfl: 3  •  Multiple Vitamins-Minerals (CENTRUM SILVER ADULT 50+) tablet, Take 1 tablet by mouth Daily., Disp: , Rfl:     Current Facility-Administered Medications:   •  nitroglycerin (NITROSTAT) SL tablet 0.4 mg, 0.4 mg, Sublingual, Q5 Min PRN, Katerina Pierre MD  •  sodium chloride 0.9 % flush 10 mL, 10 mL, Intravenous, PRN, Katerina Pierre MD        OBJECTIVE     Vital Signs:   /60 (BP Location: Right arm)   Pulse 113   Ht 179.1 cm (70.5\")   Wt 86.8 kg (191 lb 6.4 oz)   BMI 27.07 kg/m²       Weight:  Wt Readings from Last 3 Encounters:   05/17/22 86.8 kg (191 lb 6.4 oz)   05/10/22 87.5 kg (193 lb)   11/10/21 88.1 kg (194 lb 3.2 oz)     Body mass index is 27.07 kg/m².        Physical Exam     Physical Exam  Constitutional:       General: He is not in acute distress.  HENT:      Head: Normocephalic and atraumatic.      Mouth/Throat:      Mouth: Mucous membranes are moist.   Eyes:      General: No scleral icterus.     Extraocular Movements: Extraocular movements intact.      Conjunctiva/sclera: Conjunctivae normal.      Pupils: Pupils are equal, round, and reactive to light.   Cardiovascular:      Rate and Rhythm: Normal rate. Rhythm irregularly irregular.      Pulses: Normal pulses.      Heart sounds: S1 normal and S2 normal.   Pulmonary:      Effort: No respiratory distress.      Breath sounds: Normal breath sounds. No wheezing, rhonchi " or rales.   Abdominal:      General: Bowel sounds are normal. There is no distension.      Palpations: Abdomen is soft.      Tenderness: There is no abdominal tenderness.   Musculoskeletal:         General: Normal range of motion.      Cervical back: Normal range of motion and neck supple.      Right lower le+ Pitting Edema present.      Left lower le+ Pitting Edema present.   Skin:     General: Skin is warm and dry.      Coloration: Skin is not jaundiced.   Neurological:      Mental Status: He is alert and oriented to person, place, and time.   Psychiatric:         Mood and Affect: Mood normal.         Reviewed Data     Result Review :   The following data was reviewed by: NERY Pagan on 2022:  Labs from May 10, 2022 show proBNP 1758, unremarkable CMP with creatinine 1.15 and potassium 3.7, normal thyroid studies with TSH of 0.631 and free T4 1.21, normal D-dimer, normal CBC.        ECG 12 Lead    Date/Time: 2022 12:44 PM  Performed by: Janeth Castro APRN  Authorized by: Janeth Castro APRN   Comparison: compared with previous ECG from 5/10/2022  Similar to previous ECG  Rhythm: atrial fibrillation  Rate: tachycardic  BPM: 113    Clinical impression: abnormal EKG            Assessment and Plan        Assessment and Plan      Assessment:  1. Atrial fibrillation, persistent (HCC)    2. Acute systolic CHF (congestive heart failure) (HCC)    3. Nonrheumatic mitral valve regurgitation    4. Accelerated hypertension    5. Mixed hyperlipidemia    6. Almaraz's esophagus with dysplasia         1. Persistent atrial fibrillation: He remains in atrial fibrillation per ECG today.  He is not bothered by symptoms during the day but does frequently awaken with a feeling of his heart racing; this is associated with orthopnea, anxiousness, and shortness of breath.  His heart rate remains uncontrolled at night.  2. Acute systolic heart failure: Echocardiogram on May  10, 2022 showed global hypokinesis of the LV and EF of 33.7%.  His lungs are clear by exam today and his leg swelling has improved.  3. Nonrheumatic mitral valve regurgitation: Graded mild to moderate on echocardiogram dated May 10, 2022  4. Hypertension: Controlled on current regimen though heart rate remains elevated at times.  Would favor stopping hydrochlorothiazide if hypotension becomes an issue; amlodipine was stopped at last visit.  5. Mixed hyperlipidemia: He is treated with 10 mg of Lipitor nightly.  Last lipid panel was checked in April 6, 2021.  6. Almaraz's esophagus: He is stable on 40 mg Nexium daily.    Plan:  1. We will increase his metoprolol to tartrate 100 mg at night but keep 50 mg a morning.  Low threshold to stop hydrochlorothiazide if hypotension becomes an issue.  I will check with him in 1 week to see how his symptoms are doing.  2. Discussed that Dr. Pierre recommends that he have cardioversion given his reduced EF.  He is in agreement with this.  5 mg apixaban twice daily was started on May 10, 2022 and he has missed no doses.  3. We will plan for outpatient ischemic evaluation once his heart rate is stable.  I will tentatively schedule him to see me in 4 weeks.    Atrial Fibrillation and Atrial Flutter  Assessment  • The patient has persistent atrial fibrillation  • This is valvular in etiology  • The patient's CHADS2-VASc score is 4  • A [id number] score of 2 or more is considered a high risk for a thromboembolic event  • Apixaban prescribed    Plan  • Attempt to maintain sinus rhythm  • Continue apixaban for antithrombotic therapy, bleeding issues discussed  • Continue beta blocker for rhythm control         Follow Up:   Return in about 4 weeks (around 6/14/2022) for Follow-up with NERY Yepez.  Orders Placed This Encounter   Procedures   • ECG 12 Lead          I appreciate the opportunity to participate in this patient's care.      Thank you,  Yakelin Forrester  APRN

## 2022-05-23 ENCOUNTER — TELEPHONE (OUTPATIENT)
Dept: CARDIOLOGY | Facility: CLINIC | Age: 67
End: 2022-05-23

## 2022-05-23 RX ORDER — LISINOPRIL 20 MG/1
20 TABLET ORAL DAILY
Qty: 90 TABLET | Refills: 3 | Status: SHIPPED | OUTPATIENT
Start: 2022-05-23 | End: 2022-05-28

## 2022-05-23 NOTE — TELEPHONE ENCOUNTER
I called him. OK to take an extra 25 mg metoprolol tartrate at night when he is symptomatic.     I stopped HCTZ to give him more room on his BP.     I sent in 20 mg lisinopril.     I will call him on 6/6/22 to see if he is in Afib; if so, we will schedule cardioversion.     Thanks!  NERY Yepez

## 2022-05-23 NOTE — TELEPHONE ENCOUNTER
"Patient and his wife called. The patient is still having problems with his afib. He was up all night feeling anxious and jittery and he could tell his heart was \"acting up\" I asked him what his HR was when he felt like this and he said it was 89. Before then it was 60. He has been taking his metoprolol 1 pill in the AM and 2 pills at night. They do not understand what to do in the mean time. They said there was talk of a cardioversion, but they understand that can't happen until he has been on eliquis for 3 weeks. They are also concerned that he is \"damaging his heart\" by being in afib. I explained that the HRs they are giving me are normal and we like for it to be less than 100, although it can be uncomfortable to be in afib. His vitals this AM were 100/60 HR 70 and while I was on the phone it was 125/98 HR 53.     He also has complaints of having diarrhea this morning and they think it is from increasing his metoprolol.     Do you have any further recommendations?    Jackie Arana RN  Triage [facility]    "

## 2022-05-24 NOTE — TELEPHONE ENCOUNTER
I spoke with the patient today. He is still wanting to make the switch to Dr. Stern as his new cardiologist. I told him that at this time we will plan on Yakelin calling him on 6/6 and then after this for future follow-ups we will have him see Dr. Stern. He agreed with that plan.     Jackie Arana RN  Triage [facility]

## 2022-05-28 ENCOUNTER — HOSPITAL ENCOUNTER (INPATIENT)
Facility: HOSPITAL | Age: 67
LOS: 3 days | Discharge: HOME | End: 2022-06-01
Attending: EMERGENCY MEDICINE | Admitting: INTERNAL MEDICINE

## 2022-05-28 ENCOUNTER — APPOINTMENT (OUTPATIENT)
Dept: GENERAL RADIOLOGY | Facility: HOSPITAL | Age: 67
End: 2022-05-28

## 2022-05-28 ENCOUNTER — TELEPHONE (OUTPATIENT)
Dept: CARDIOLOGY | Facility: CLINIC | Age: 67
End: 2022-05-28

## 2022-05-28 DIAGNOSIS — I50.9 ACUTE ON CHRONIC CONGESTIVE HEART FAILURE, UNSPECIFIED HEART FAILURE TYPE: ICD-10-CM

## 2022-05-28 DIAGNOSIS — I48.91 ATRIAL FIBRILLATION WITH RVR: Primary | ICD-10-CM

## 2022-05-28 LAB
ALBUMIN SERPL-MCNC: 4.2 G/DL (ref 3.5–5.2)
ALBUMIN/GLOB SERPL: 1.4 G/DL
ALP SERPL-CCNC: 89 U/L (ref 39–117)
ALT SERPL W P-5'-P-CCNC: 45 U/L (ref 1–41)
ANION GAP SERPL CALCULATED.3IONS-SCNC: 12.7 MMOL/L (ref 5–15)
AST SERPL-CCNC: 31 U/L (ref 1–40)
B PARAPERT DNA SPEC QL NAA+PROBE: NOT DETECTED
B PERT DNA SPEC QL NAA+PROBE: NOT DETECTED
BASOPHILS # BLD AUTO: 0.06 10*3/MM3 (ref 0–0.2)
BASOPHILS NFR BLD AUTO: 0.4 % (ref 0–1.5)
BILIRUB SERPL-MCNC: 1.6 MG/DL (ref 0–1.2)
BUN SERPL-MCNC: 17 MG/DL (ref 8–23)
BUN/CREAT SERPL: 14.4 (ref 7–25)
C PNEUM DNA NPH QL NAA+NON-PROBE: NOT DETECTED
CALCIUM SPEC-SCNC: 9.7 MG/DL (ref 8.6–10.5)
CHLORIDE SERPL-SCNC: 99 MMOL/L (ref 98–107)
CO2 SERPL-SCNC: 25.3 MMOL/L (ref 22–29)
CREAT SERPL-MCNC: 1.18 MG/DL (ref 0.76–1.27)
DEPRECATED RDW RBC AUTO: 47.5 FL (ref 37–54)
EGFRCR SERPLBLD CKD-EPI 2021: 67.2 ML/MIN/1.73
EOSINOPHIL # BLD AUTO: 0.04 10*3/MM3 (ref 0–0.4)
EOSINOPHIL NFR BLD AUTO: 0.3 % (ref 0.3–6.2)
ERYTHROCYTE [DISTWIDTH] IN BLOOD BY AUTOMATED COUNT: 13.9 % (ref 12.3–15.4)
FLUAV SUBTYP SPEC NAA+PROBE: NOT DETECTED
FLUBV RNA ISLT QL NAA+PROBE: NOT DETECTED
GLOBULIN UR ELPH-MCNC: 3 GM/DL
GLUCOSE SERPL-MCNC: 106 MG/DL (ref 65–99)
HADV DNA SPEC NAA+PROBE: NOT DETECTED
HCOV 229E RNA SPEC QL NAA+PROBE: NOT DETECTED
HCOV HKU1 RNA SPEC QL NAA+PROBE: NOT DETECTED
HCOV NL63 RNA SPEC QL NAA+PROBE: NOT DETECTED
HCOV OC43 RNA SPEC QL NAA+PROBE: NOT DETECTED
HCT VFR BLD AUTO: 44.7 % (ref 37.5–51)
HGB BLD-MCNC: 14.7 G/DL (ref 13–17.7)
HMPV RNA NPH QL NAA+NON-PROBE: NOT DETECTED
HPIV1 RNA ISLT QL NAA+PROBE: NOT DETECTED
HPIV2 RNA SPEC QL NAA+PROBE: NOT DETECTED
HPIV3 RNA NPH QL NAA+PROBE: NOT DETECTED
HPIV4 P GENE NPH QL NAA+PROBE: NOT DETECTED
IMM GRANULOCYTES # BLD AUTO: 0.02 10*3/MM3 (ref 0–0.05)
IMM GRANULOCYTES NFR BLD AUTO: 0.1 % (ref 0–0.5)
LYMPHOCYTES # BLD AUTO: 1.01 10*3/MM3 (ref 0.7–3.1)
LYMPHOCYTES NFR BLD AUTO: 7.5 % (ref 19.6–45.3)
M PNEUMO IGG SER IA-ACNC: NOT DETECTED
MCH RBC QN AUTO: 30.6 PG (ref 26.6–33)
MCHC RBC AUTO-ENTMCNC: 32.9 G/DL (ref 31.5–35.7)
MCV RBC AUTO: 93.1 FL (ref 79–97)
MONOCYTES # BLD AUTO: 1.08 10*3/MM3 (ref 0.1–0.9)
MONOCYTES NFR BLD AUTO: 8 % (ref 5–12)
NEUTROPHILS NFR BLD AUTO: 11.21 10*3/MM3 (ref 1.7–7)
NEUTROPHILS NFR BLD AUTO: 83.7 % (ref 42.7–76)
NRBC BLD AUTO-RTO: 0 /100 WBC (ref 0–0.2)
NT-PROBNP SERPL-MCNC: 7686 PG/ML (ref 0–900)
PLATELET # BLD AUTO: 185 10*3/MM3 (ref 140–450)
PMV BLD AUTO: 12.6 FL (ref 6–12)
POTASSIUM SERPL-SCNC: 3.9 MMOL/L (ref 3.5–5.2)
PROT SERPL-MCNC: 7.2 G/DL (ref 6–8.5)
QT INTERVAL: 313 MS
RBC # BLD AUTO: 4.8 10*6/MM3 (ref 4.14–5.8)
RHINOVIRUS RNA SPEC NAA+PROBE: NOT DETECTED
RSV RNA NPH QL NAA+NON-PROBE: NOT DETECTED
SARS-COV-2 RNA NPH QL NAA+NON-PROBE: NOT DETECTED
SODIUM SERPL-SCNC: 137 MMOL/L (ref 136–145)
TROPONIN T SERPL-MCNC: <0.01 NG/ML (ref 0–0.03)
WBC NRBC COR # BLD: 13.42 10*3/MM3 (ref 3.4–10.8)

## 2022-05-28 PROCEDURE — 85025 COMPLETE CBC W/AUTO DIFF WBC: CPT | Performed by: EMERGENCY MEDICINE

## 2022-05-28 PROCEDURE — 84484 ASSAY OF TROPONIN QUANT: CPT | Performed by: EMERGENCY MEDICINE

## 2022-05-28 PROCEDURE — 0202U NFCT DS 22 TRGT SARS-COV-2: CPT | Performed by: EMERGENCY MEDICINE

## 2022-05-28 PROCEDURE — 93010 ELECTROCARDIOGRAM REPORT: CPT | Performed by: INTERNAL MEDICINE

## 2022-05-28 PROCEDURE — 25010000002 DIGOXIN PER 500 MCG: Performed by: INTERNAL MEDICINE

## 2022-05-28 PROCEDURE — 25010000002 FUROSEMIDE PER 20 MG: Performed by: INTERNAL MEDICINE

## 2022-05-28 PROCEDURE — 25010000002 CEFTRIAXONE PER 250 MG: Performed by: INTERNAL MEDICINE

## 2022-05-28 PROCEDURE — 25010000002 FUROSEMIDE PER 20 MG: Performed by: EMERGENCY MEDICINE

## 2022-05-28 PROCEDURE — G0378 HOSPITAL OBSERVATION PER HR: HCPCS

## 2022-05-28 PROCEDURE — 83880 ASSAY OF NATRIURETIC PEPTIDE: CPT | Performed by: EMERGENCY MEDICINE

## 2022-05-28 PROCEDURE — 93005 ELECTROCARDIOGRAM TRACING: CPT | Performed by: EMERGENCY MEDICINE

## 2022-05-28 PROCEDURE — 80053 COMPREHEN METABOLIC PANEL: CPT | Performed by: EMERGENCY MEDICINE

## 2022-05-28 PROCEDURE — 71045 X-RAY EXAM CHEST 1 VIEW: CPT

## 2022-05-28 PROCEDURE — 99285 EMERGENCY DEPT VISIT HI MDM: CPT

## 2022-05-28 RX ORDER — ONDANSETRON 4 MG/1
4 TABLET, FILM COATED ORAL EVERY 6 HOURS PRN
Status: DISCONTINUED | OUTPATIENT
Start: 2022-05-28 | End: 2022-06-01 | Stop reason: HOSPADM

## 2022-05-28 RX ORDER — UREA 10 %
3 LOTION (ML) TOPICAL NIGHTLY PRN
Status: DISCONTINUED | OUTPATIENT
Start: 2022-05-28 | End: 2022-06-01 | Stop reason: HOSPADM

## 2022-05-28 RX ORDER — METOPROLOL TARTRATE 50 MG/1
50 TABLET, FILM COATED ORAL EVERY MORNING
Status: DISCONTINUED | OUTPATIENT
Start: 2022-05-29 | End: 2022-05-29

## 2022-05-28 RX ORDER — NICOTINE POLACRILEX 4 MG
15 LOZENGE BUCCAL
Status: DISCONTINUED | OUTPATIENT
Start: 2022-05-28 | End: 2022-06-01 | Stop reason: HOSPADM

## 2022-05-28 RX ORDER — ACETAMINOPHEN 325 MG/1
650 TABLET ORAL EVERY 4 HOURS PRN
Status: DISCONTINUED | OUTPATIENT
Start: 2022-05-28 | End: 2022-06-01 | Stop reason: HOSPADM

## 2022-05-28 RX ORDER — LISINOPRIL 20 MG/1
20 TABLET ORAL DAILY
Status: DISCONTINUED | OUTPATIENT
Start: 2022-05-29 | End: 2022-05-29

## 2022-05-28 RX ORDER — ATORVASTATIN CALCIUM 20 MG/1
10 TABLET, FILM COATED ORAL DAILY
Status: DISCONTINUED | OUTPATIENT
Start: 2022-05-29 | End: 2022-06-01 | Stop reason: HOSPADM

## 2022-05-28 RX ORDER — FUROSEMIDE 10 MG/ML
40 INJECTION INTRAMUSCULAR; INTRAVENOUS EVERY 12 HOURS
Status: DISCONTINUED | OUTPATIENT
Start: 2022-05-28 | End: 2022-05-30

## 2022-05-28 RX ORDER — SODIUM CHLORIDE 0.9 % (FLUSH) 0.9 %
10 SYRINGE (ML) INJECTION AS NEEDED
Status: DISCONTINUED | OUTPATIENT
Start: 2022-05-28 | End: 2022-06-01 | Stop reason: HOSPADM

## 2022-05-28 RX ORDER — ESOMEPRAZOLE MAGNESIUM 40 MG/1
40 CAPSULE, DELAYED RELEASE ORAL
COMMUNITY
End: 2022-09-29 | Stop reason: SDUPTHER

## 2022-05-28 RX ORDER — DIGOXIN 0.25 MG/ML
250 INJECTION INTRAMUSCULAR; INTRAVENOUS ONCE
Status: COMPLETED | OUTPATIENT
Start: 2022-05-28 | End: 2022-05-28

## 2022-05-28 RX ORDER — INSULIN LISPRO 100 [IU]/ML
0-9 INJECTION, SOLUTION INTRAVENOUS; SUBCUTANEOUS
Status: DISCONTINUED | OUTPATIENT
Start: 2022-05-29 | End: 2022-06-01 | Stop reason: HOSPADM

## 2022-05-28 RX ORDER — DEXTROSE MONOHYDRATE 25 G/50ML
25 INJECTION, SOLUTION INTRAVENOUS
Status: DISCONTINUED | OUTPATIENT
Start: 2022-05-28 | End: 2022-06-01 | Stop reason: HOSPADM

## 2022-05-28 RX ORDER — ONDANSETRON 2 MG/ML
4 INJECTION INTRAMUSCULAR; INTRAVENOUS EVERY 6 HOURS PRN
Status: DISCONTINUED | OUTPATIENT
Start: 2022-05-28 | End: 2022-06-01 | Stop reason: HOSPADM

## 2022-05-28 RX ORDER — PANTOPRAZOLE SODIUM 40 MG/1
40 TABLET, DELAYED RELEASE ORAL EVERY MORNING
Status: DISCONTINUED | OUTPATIENT
Start: 2022-05-29 | End: 2022-06-01 | Stop reason: HOSPADM

## 2022-05-28 RX ORDER — MULTIPLE VITAMINS W/ MINERALS TAB 9MG-400MCG
1 TAB ORAL DAILY
Status: DISCONTINUED | OUTPATIENT
Start: 2022-05-29 | End: 2022-06-01 | Stop reason: HOSPADM

## 2022-05-28 RX ORDER — METOPROLOL TARTRATE 50 MG/1
50 TABLET, FILM COATED ORAL EVERY MORNING
COMMUNITY
End: 2022-06-01 | Stop reason: HOSPADM

## 2022-05-28 RX ORDER — NITROGLYCERIN 0.4 MG/1
0.4 TABLET SUBLINGUAL
Status: DISCONTINUED | OUTPATIENT
Start: 2022-05-28 | End: 2022-06-01 | Stop reason: HOSPADM

## 2022-05-28 RX ORDER — FUROSEMIDE 10 MG/ML
40 INJECTION INTRAMUSCULAR; INTRAVENOUS ONCE
Status: COMPLETED | OUTPATIENT
Start: 2022-05-28 | End: 2022-05-28

## 2022-05-28 RX ORDER — LISINOPRIL 20 MG/1
20 TABLET ORAL DAILY
COMMUNITY
End: 2022-06-01 | Stop reason: HOSPADM

## 2022-05-28 RX ORDER — ATORVASTATIN CALCIUM 10 MG/1
10 TABLET, FILM COATED ORAL DAILY
COMMUNITY
End: 2022-09-23

## 2022-05-28 RX ORDER — LISINOPRIL AND HYDROCHLOROTHIAZIDE 25; 20 MG/1; MG/1
1 TABLET ORAL DAILY
COMMUNITY
End: 2022-06-01 | Stop reason: HOSPADM

## 2022-05-28 RX ORDER — DIGOXIN 0.25 MG/ML
500 INJECTION INTRAMUSCULAR; INTRAVENOUS ONCE
Status: DISCONTINUED | OUTPATIENT
Start: 2022-05-28 | End: 2022-05-28

## 2022-05-28 RX ORDER — METOPROLOL TARTRATE 50 MG/1
100 TABLET, FILM COATED ORAL EVERY EVENING
COMMUNITY
End: 2022-06-01 | Stop reason: HOSPADM

## 2022-05-28 RX ADMIN — FUROSEMIDE 40 MG: 10 INJECTION, SOLUTION INTRAMUSCULAR; INTRAVENOUS at 21:25

## 2022-05-28 RX ADMIN — METOPROLOL TARTRATE 5 MG: 1 INJECTION, SOLUTION INTRAVENOUS at 15:37

## 2022-05-28 RX ADMIN — DIGOXIN 250 MCG: 0.25 INJECTION INTRAMUSCULAR; INTRAVENOUS at 21:26

## 2022-05-28 RX ADMIN — CEFTRIAXONE 1 G: 1 INJECTION, POWDER, FOR SOLUTION INTRAMUSCULAR; INTRAVENOUS at 21:26

## 2022-05-28 RX ADMIN — APIXABAN 5 MG: 5 TABLET, FILM COATED ORAL at 21:25

## 2022-05-28 RX ADMIN — METOPROLOL TARTRATE 5 MG: 1 INJECTION, SOLUTION INTRAVENOUS at 15:25

## 2022-05-28 RX ADMIN — FUROSEMIDE 40 MG: 10 INJECTION, SOLUTION INTRAMUSCULAR; INTRAVENOUS at 16:14

## 2022-05-28 NOTE — TELEPHONE ENCOUNTER
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 He wanted to restart his HCTZ, however his blood pressure was on the lower end of normal.    I spoke to Dr. Stern who recommended patient come into the ER to evaluate for CHF exacerbation.  When I called patient back, patient's wife confirmed that patient is having significant shortness of breath with minimal exertion.     I recommended they come into the ER for evaluation. She is going to convince him to come in to be seen.    Thanks    Anahi  "

## 2022-05-29 PROBLEM — I50.9 ACUTE ON CHRONIC CONGESTIVE HEART FAILURE: Status: ACTIVE | Noted: 2022-05-29

## 2022-05-29 LAB
ANION GAP SERPL CALCULATED.3IONS-SCNC: 12.5 MMOL/L (ref 5–15)
BUN SERPL-MCNC: 17 MG/DL (ref 8–23)
BUN/CREAT SERPL: 15.5 (ref 7–25)
CALCIUM SPEC-SCNC: 8.8 MG/DL (ref 8.6–10.5)
CHLORIDE SERPL-SCNC: 98 MMOL/L (ref 98–107)
CO2 SERPL-SCNC: 29.5 MMOL/L (ref 22–29)
CREAT SERPL-MCNC: 1.1 MG/DL (ref 0.76–1.27)
DEPRECATED RDW RBC AUTO: 49.2 FL (ref 37–54)
EGFRCR SERPLBLD CKD-EPI 2021: 73.1 ML/MIN/1.73
ERYTHROCYTE [DISTWIDTH] IN BLOOD BY AUTOMATED COUNT: 14.5 % (ref 12.3–15.4)
GLUCOSE BLDC GLUCOMTR-MCNC: 100 MG/DL (ref 70–130)
GLUCOSE BLDC GLUCOMTR-MCNC: 113 MG/DL (ref 70–130)
GLUCOSE BLDC GLUCOMTR-MCNC: 91 MG/DL (ref 70–130)
GLUCOSE SERPL-MCNC: 101 MG/DL (ref 65–99)
HBA1C MFR BLD: 5.8 % (ref 4.8–5.6)
HCT VFR BLD AUTO: 42.9 % (ref 37.5–51)
HGB BLD-MCNC: 14.5 G/DL (ref 13–17.7)
MCH RBC QN AUTO: 31 PG (ref 26.6–33)
MCHC RBC AUTO-ENTMCNC: 33.8 G/DL (ref 31.5–35.7)
MCV RBC AUTO: 91.7 FL (ref 79–97)
PLATELET # BLD AUTO: 162 10*3/MM3 (ref 140–450)
PMV BLD AUTO: 12.3 FL (ref 6–12)
POTASSIUM SERPL-SCNC: 3 MMOL/L (ref 3.5–5.2)
POTASSIUM SERPL-SCNC: 4.5 MMOL/L (ref 3.5–5.2)
RBC # BLD AUTO: 4.68 10*6/MM3 (ref 4.14–5.8)
SODIUM SERPL-SCNC: 140 MMOL/L (ref 136–145)
TROPONIN T SERPL-MCNC: <0.01 NG/ML (ref 0–0.03)
WBC NRBC COR # BLD: 11.14 10*3/MM3 (ref 3.4–10.8)

## 2022-05-29 PROCEDURE — 99222 1ST HOSP IP/OBS MODERATE 55: CPT | Performed by: INTERNAL MEDICINE

## 2022-05-29 PROCEDURE — 84484 ASSAY OF TROPONIN QUANT: CPT | Performed by: INTERNAL MEDICINE

## 2022-05-29 PROCEDURE — 83036 HEMOGLOBIN GLYCOSYLATED A1C: CPT | Performed by: INTERNAL MEDICINE

## 2022-05-29 PROCEDURE — 80048 BASIC METABOLIC PNL TOTAL CA: CPT | Performed by: INTERNAL MEDICINE

## 2022-05-29 PROCEDURE — 82962 GLUCOSE BLOOD TEST: CPT

## 2022-05-29 PROCEDURE — 36415 COLL VENOUS BLD VENIPUNCTURE: CPT | Performed by: INTERNAL MEDICINE

## 2022-05-29 PROCEDURE — 85027 COMPLETE CBC AUTOMATED: CPT | Performed by: INTERNAL MEDICINE

## 2022-05-29 PROCEDURE — 25010000002 CEFTRIAXONE PER 250 MG: Performed by: INTERNAL MEDICINE

## 2022-05-29 PROCEDURE — 25010000002 DIGOXIN PER 500 MCG: Performed by: INTERNAL MEDICINE

## 2022-05-29 PROCEDURE — 84132 ASSAY OF SERUM POTASSIUM: CPT | Performed by: INTERNAL MEDICINE

## 2022-05-29 PROCEDURE — 25010000002 FUROSEMIDE PER 20 MG: Performed by: INTERNAL MEDICINE

## 2022-05-29 RX ORDER — METOPROLOL SUCCINATE 50 MG/1
50 TABLET, EXTENDED RELEASE ORAL
Status: DISCONTINUED | OUTPATIENT
Start: 2022-05-29 | End: 2022-06-01 | Stop reason: HOSPADM

## 2022-05-29 RX ORDER — POTASSIUM CHLORIDE 750 MG/1
40 TABLET, FILM COATED, EXTENDED RELEASE ORAL AS NEEDED
Status: DISCONTINUED | OUTPATIENT
Start: 2022-05-29 | End: 2022-06-01 | Stop reason: HOSPADM

## 2022-05-29 RX ORDER — DIGOXIN 0.25 MG/ML
250 INJECTION INTRAMUSCULAR; INTRAVENOUS ONCE
Status: COMPLETED | OUTPATIENT
Start: 2022-05-29 | End: 2022-05-29

## 2022-05-29 RX ORDER — POTASSIUM CHLORIDE 1.5 G/1.77G
40 POWDER, FOR SOLUTION ORAL AS NEEDED
Status: DISCONTINUED | OUTPATIENT
Start: 2022-05-29 | End: 2022-06-01 | Stop reason: HOSPADM

## 2022-05-29 RX ADMIN — APIXABAN 5 MG: 5 TABLET, FILM COATED ORAL at 21:24

## 2022-05-29 RX ADMIN — POTASSIUM CHLORIDE 40 MEQ: 750 TABLET, EXTENDED RELEASE ORAL at 15:47

## 2022-05-29 RX ADMIN — FUROSEMIDE 40 MG: 10 INJECTION, SOLUTION INTRAMUSCULAR; INTRAVENOUS at 21:24

## 2022-05-29 RX ADMIN — MULTIPLE VITAMINS W/ MINERALS TAB 1 TABLET: TAB at 08:13

## 2022-05-29 RX ADMIN — POTASSIUM CHLORIDE 40 MEQ: 750 TABLET, EXTENDED RELEASE ORAL at 11:27

## 2022-05-29 RX ADMIN — ATORVASTATIN CALCIUM 10 MG: 20 TABLET, FILM COATED ORAL at 21:24

## 2022-05-29 RX ADMIN — LISINOPRIL 20 MG: 20 TABLET ORAL at 08:13

## 2022-05-29 RX ADMIN — PANTOPRAZOLE SODIUM 40 MG: 40 TABLET, DELAYED RELEASE ORAL at 05:36

## 2022-05-29 RX ADMIN — METOPROLOL TARTRATE 5 MG: 1 INJECTION, SOLUTION INTRAVENOUS at 18:53

## 2022-05-29 RX ADMIN — CEFTRIAXONE 1 G: 1 INJECTION, POWDER, FOR SOLUTION INTRAMUSCULAR; INTRAVENOUS at 21:25

## 2022-05-29 RX ADMIN — METOPROLOL SUCCINATE 50 MG: 50 TABLET, EXTENDED RELEASE ORAL at 11:27

## 2022-05-29 RX ADMIN — FUROSEMIDE 40 MG: 10 INJECTION, SOLUTION INTRAMUSCULAR; INTRAVENOUS at 08:13

## 2022-05-29 RX ADMIN — METOPROLOL TARTRATE 50 MG: 50 TABLET, FILM COATED ORAL at 05:35

## 2022-05-29 RX ADMIN — DIGOXIN 250 MCG: 0.25 INJECTION INTRAMUSCULAR; INTRAVENOUS at 10:07

## 2022-05-29 RX ADMIN — APIXABAN 5 MG: 5 TABLET, FILM COATED ORAL at 08:12

## 2022-05-29 RX ADMIN — POTASSIUM CHLORIDE 40 MEQ: 750 TABLET, EXTENDED RELEASE ORAL at 13:59

## 2022-05-30 LAB
ANION GAP SERPL CALCULATED.3IONS-SCNC: 12 MMOL/L (ref 5–15)
BASOPHILS # BLD AUTO: 0.07 10*3/MM3 (ref 0–0.2)
BASOPHILS NFR BLD AUTO: 0.7 % (ref 0–1.5)
BUN SERPL-MCNC: 23 MG/DL (ref 8–23)
BUN/CREAT SERPL: 17.4 (ref 7–25)
CALCIUM SPEC-SCNC: 8.8 MG/DL (ref 8.6–10.5)
CHLORIDE SERPL-SCNC: 101 MMOL/L (ref 98–107)
CO2 SERPL-SCNC: 28 MMOL/L (ref 22–29)
CREAT SERPL-MCNC: 1.32 MG/DL (ref 0.76–1.27)
DEPRECATED RDW RBC AUTO: 43.7 FL (ref 37–54)
EGFRCR SERPLBLD CKD-EPI 2021: 58.8 ML/MIN/1.73
EOSINOPHIL # BLD AUTO: 0.49 10*3/MM3 (ref 0–0.4)
EOSINOPHIL NFR BLD AUTO: 4.8 % (ref 0.3–6.2)
ERYTHROCYTE [DISTWIDTH] IN BLOOD BY AUTOMATED COUNT: 13.3 % (ref 12.3–15.4)
GLUCOSE BLDC GLUCOMTR-MCNC: 100 MG/DL (ref 70–130)
GLUCOSE BLDC GLUCOMTR-MCNC: 88 MG/DL (ref 70–130)
GLUCOSE BLDC GLUCOMTR-MCNC: 92 MG/DL (ref 70–130)
GLUCOSE BLDC GLUCOMTR-MCNC: 93 MG/DL (ref 70–130)
GLUCOSE SERPL-MCNC: 100 MG/DL (ref 65–99)
HCT VFR BLD AUTO: 43.6 % (ref 37.5–51)
HGB BLD-MCNC: 15 G/DL (ref 13–17.7)
IMM GRANULOCYTES # BLD AUTO: 0.04 10*3/MM3 (ref 0–0.05)
IMM GRANULOCYTES NFR BLD AUTO: 0.4 % (ref 0–0.5)
LYMPHOCYTES # BLD AUTO: 1.58 10*3/MM3 (ref 0.7–3.1)
LYMPHOCYTES NFR BLD AUTO: 15.6 % (ref 19.6–45.3)
MCH RBC QN AUTO: 30.5 PG (ref 26.6–33)
MCHC RBC AUTO-ENTMCNC: 34.4 G/DL (ref 31.5–35.7)
MCV RBC AUTO: 88.8 FL (ref 79–97)
MONOCYTES # BLD AUTO: 1.2 10*3/MM3 (ref 0.1–0.9)
MONOCYTES NFR BLD AUTO: 11.8 % (ref 5–12)
NEUTROPHILS NFR BLD AUTO: 6.78 10*3/MM3 (ref 1.7–7)
NEUTROPHILS NFR BLD AUTO: 66.7 % (ref 42.7–76)
NRBC BLD AUTO-RTO: 0 /100 WBC (ref 0–0.2)
PLATELET # BLD AUTO: 169 10*3/MM3 (ref 140–450)
PMV BLD AUTO: 12.5 FL (ref 6–12)
POTASSIUM SERPL-SCNC: 3.7 MMOL/L (ref 3.5–5.2)
RBC # BLD AUTO: 4.91 10*6/MM3 (ref 4.14–5.8)
SARS-COV-2 RNA RESP QL NAA+PROBE: NOT DETECTED
SODIUM SERPL-SCNC: 141 MMOL/L (ref 136–145)
WBC NRBC COR # BLD: 10.16 10*3/MM3 (ref 3.4–10.8)

## 2022-05-30 PROCEDURE — U0003 INFECTIOUS AGENT DETECTION BY NUCLEIC ACID (DNA OR RNA); SEVERE ACUTE RESPIRATORY SYNDROME CORONAVIRUS 2 (SARS-COV-2) (CORONAVIRUS DISEASE [COVID-19]), AMPLIFIED PROBE TECHNIQUE, MAKING USE OF HIGH THROUGHPUT TECHNOLOGIES AS DESCRIBED BY CMS-2020-01-R: HCPCS | Performed by: INTERNAL MEDICINE

## 2022-05-30 PROCEDURE — U0005 INFEC AGEN DETEC AMPLI PROBE: HCPCS | Performed by: INTERNAL MEDICINE

## 2022-05-30 PROCEDURE — 25010000002 CEFTRIAXONE PER 250 MG: Performed by: INTERNAL MEDICINE

## 2022-05-30 PROCEDURE — 80048 BASIC METABOLIC PNL TOTAL CA: CPT | Performed by: HOSPITALIST

## 2022-05-30 PROCEDURE — 25010000002 DIGOXIN PER 500 MCG: Performed by: INTERNAL MEDICINE

## 2022-05-30 PROCEDURE — 99232 SBSQ HOSP IP/OBS MODERATE 35: CPT | Performed by: INTERNAL MEDICINE

## 2022-05-30 PROCEDURE — 85025 COMPLETE CBC W/AUTO DIFF WBC: CPT | Performed by: HOSPITALIST

## 2022-05-30 PROCEDURE — 82962 GLUCOSE BLOOD TEST: CPT

## 2022-05-30 RX ORDER — FUROSEMIDE 40 MG/1
40 TABLET ORAL DAILY
Status: DISCONTINUED | OUTPATIENT
Start: 2022-05-30 | End: 2022-06-01 | Stop reason: HOSPADM

## 2022-05-30 RX ORDER — DIGOXIN 0.25 MG/ML
500 INJECTION INTRAMUSCULAR; INTRAVENOUS ONCE
Status: COMPLETED | OUTPATIENT
Start: 2022-05-30 | End: 2022-05-30

## 2022-05-30 RX ADMIN — CEFTRIAXONE 1 G: 1 INJECTION, POWDER, FOR SOLUTION INTRAMUSCULAR; INTRAVENOUS at 20:39

## 2022-05-30 RX ADMIN — PANTOPRAZOLE SODIUM 40 MG: 40 TABLET, DELAYED RELEASE ORAL at 06:22

## 2022-05-30 RX ADMIN — APIXABAN 5 MG: 5 TABLET, FILM COATED ORAL at 09:28

## 2022-05-30 RX ADMIN — ATORVASTATIN CALCIUM 10 MG: 20 TABLET, FILM COATED ORAL at 20:38

## 2022-05-30 RX ADMIN — METOPROLOL SUCCINATE 50 MG: 50 TABLET, EXTENDED RELEASE ORAL at 09:28

## 2022-05-30 RX ADMIN — MULTIPLE VITAMINS W/ MINERALS TAB 1 TABLET: TAB at 09:28

## 2022-05-30 RX ADMIN — DIGOXIN 500 MCG: 0.25 INJECTION INTRAMUSCULAR; INTRAVENOUS at 09:28

## 2022-05-30 RX ADMIN — APIXABAN 5 MG: 5 TABLET, FILM COATED ORAL at 20:38

## 2022-05-30 RX ADMIN — FUROSEMIDE 40 MG: 40 TABLET ORAL at 09:28

## 2022-05-31 ENCOUNTER — APPOINTMENT (OUTPATIENT)
Dept: CARDIOLOGY | Facility: HOSPITAL | Age: 67
End: 2022-05-31

## 2022-05-31 LAB
ANION GAP SERPL CALCULATED.3IONS-SCNC: 10 MMOL/L (ref 5–15)
BASOPHILS # BLD AUTO: 0.08 10*3/MM3 (ref 0–0.2)
BASOPHILS NFR BLD AUTO: 0.8 % (ref 0–1.5)
BH CV ECHO MEAS - AO ROOT DIAM: 3.4 CM
BH CV ECHO MEAS - LVOT AREA: 5 CM2
BH CV ECHO MEAS - LVOT DIAM: 2.5 CM
BH CV ECHO MEAS - MR MAX PG: 129.5 MMHG
BH CV ECHO MEAS - MR MAX VEL: 569 CM/SEC
BH CV ECHO MEAS - MR MEAN PG: 79.7 MMHG
BH CV ECHO MEAS - MR MEAN VEL: 418.9 CM/SEC
BH CV ECHO MEAS - MR VTI: 160.4 CM
BUN SERPL-MCNC: 23 MG/DL (ref 8–23)
BUN/CREAT SERPL: 17 (ref 7–25)
CALCIUM SPEC-SCNC: 9 MG/DL (ref 8.6–10.5)
CHLORIDE SERPL-SCNC: 103 MMOL/L (ref 98–107)
CO2 SERPL-SCNC: 30 MMOL/L (ref 22–29)
CREAT SERPL-MCNC: 1.35 MG/DL (ref 0.76–1.27)
DEPRECATED RDW RBC AUTO: 48.6 FL (ref 37–54)
EGFRCR SERPLBLD CKD-EPI 2021: 57.2 ML/MIN/1.73
EOSINOPHIL # BLD AUTO: 0.47 10*3/MM3 (ref 0–0.4)
EOSINOPHIL NFR BLD AUTO: 4.6 % (ref 0.3–6.2)
ERYTHROCYTE [DISTWIDTH] IN BLOOD BY AUTOMATED COUNT: 13.9 % (ref 12.3–15.4)
GLUCOSE BLDC GLUCOMTR-MCNC: 103 MG/DL (ref 70–130)
GLUCOSE BLDC GLUCOMTR-MCNC: 95 MG/DL (ref 70–130)
GLUCOSE BLDC GLUCOMTR-MCNC: 97 MG/DL (ref 70–130)
GLUCOSE BLDC GLUCOMTR-MCNC: 99 MG/DL (ref 70–130)
GLUCOSE SERPL-MCNC: 83 MG/DL (ref 65–99)
HCT VFR BLD AUTO: 47.2 % (ref 37.5–51)
HGB BLD-MCNC: 15.8 G/DL (ref 13–17.7)
IMM GRANULOCYTES # BLD AUTO: 0.04 10*3/MM3 (ref 0–0.05)
IMM GRANULOCYTES NFR BLD AUTO: 0.4 % (ref 0–0.5)
INR PPP: 1.4 (ref 0.9–1.1)
LV EF 2D ECHO EST: 35 %
LYMPHOCYTES # BLD AUTO: 1.74 10*3/MM3 (ref 0.7–3.1)
LYMPHOCYTES NFR BLD AUTO: 17.2 % (ref 19.6–45.3)
MAXIMAL PREDICTED HEART RATE: 152 BPM
MAXIMAL PREDICTED HEART RATE: 152 BPM
MCH RBC QN AUTO: 30.9 PG (ref 26.6–33)
MCHC RBC AUTO-ENTMCNC: 33.5 G/DL (ref 31.5–35.7)
MCV RBC AUTO: 92.4 FL (ref 79–97)
MONOCYTES # BLD AUTO: 1.1 10*3/MM3 (ref 0.1–0.9)
MONOCYTES NFR BLD AUTO: 10.9 % (ref 5–12)
NEUTROPHILS NFR BLD AUTO: 6.69 10*3/MM3 (ref 1.7–7)
NEUTROPHILS NFR BLD AUTO: 66.1 % (ref 42.7–76)
NRBC BLD AUTO-RTO: 0.5 /100 WBC (ref 0–0.2)
PLATELET # BLD AUTO: 188 10*3/MM3 (ref 140–450)
PMV BLD AUTO: 12.2 FL (ref 6–12)
POTASSIUM SERPL-SCNC: 3.9 MMOL/L (ref 3.5–5.2)
PROTHROMBIN TIME: 16.9 SECONDS (ref 11.7–14.2)
QT INTERVAL: 351 MS
QT INTERVAL: 352 MS
RBC # BLD AUTO: 5.11 10*6/MM3 (ref 4.14–5.8)
SODIUM SERPL-SCNC: 143 MMOL/L (ref 136–145)
STRESS TARGET HR: 129 BPM
STRESS TARGET HR: 129 BPM
WBC NRBC COR # BLD: 10.12 10*3/MM3 (ref 3.4–10.8)

## 2022-05-31 PROCEDURE — 85025 COMPLETE CBC W/AUTO DIFF WBC: CPT | Performed by: HOSPITALIST

## 2022-05-31 PROCEDURE — 25010000002 AMIODARONE IN DEXTROSE 5% 360-4.14 MG/200ML-% SOLUTION: Performed by: INTERNAL MEDICINE

## 2022-05-31 PROCEDURE — 80048 BASIC METABOLIC PNL TOTAL CA: CPT | Performed by: HOSPITALIST

## 2022-05-31 PROCEDURE — 25010000002 CEFTRIAXONE PER 250 MG: Performed by: INTERNAL MEDICINE

## 2022-05-31 PROCEDURE — 25010000002 AMIODARONE IN DEXTROSE 5% 150-4.21 MG/100ML-% SOLUTION: Performed by: INTERNAL MEDICINE

## 2022-05-31 PROCEDURE — 93005 ELECTROCARDIOGRAM TRACING: CPT | Performed by: INTERNAL MEDICINE

## 2022-05-31 PROCEDURE — 92960 CARDIOVERSION ELECTRIC EXT: CPT | Performed by: INTERNAL MEDICINE

## 2022-05-31 PROCEDURE — 93010 ELECTROCARDIOGRAM REPORT: CPT | Performed by: INTERNAL MEDICINE

## 2022-05-31 PROCEDURE — 25010000002 FENTANYL CITRATE (PF) 50 MCG/ML SOLUTION: Performed by: INTERNAL MEDICINE

## 2022-05-31 PROCEDURE — 93312 ECHO TRANSESOPHAGEAL: CPT | Performed by: INTERNAL MEDICINE

## 2022-05-31 PROCEDURE — 93312 ECHO TRANSESOPHAGEAL: CPT

## 2022-05-31 PROCEDURE — 93320 DOPPLER ECHO COMPLETE: CPT

## 2022-05-31 PROCEDURE — 99152 MOD SED SAME PHYS/QHP 5/>YRS: CPT

## 2022-05-31 PROCEDURE — 85610 PROTHROMBIN TIME: CPT | Performed by: INTERNAL MEDICINE

## 2022-05-31 PROCEDURE — 93325 DOPPLER ECHO COLOR FLOW MAPG: CPT | Performed by: INTERNAL MEDICINE

## 2022-05-31 PROCEDURE — 99153 MOD SED SAME PHYS/QHP EA: CPT

## 2022-05-31 PROCEDURE — 92960 CARDIOVERSION ELECTRIC EXT: CPT

## 2022-05-31 PROCEDURE — 82962 GLUCOSE BLOOD TEST: CPT

## 2022-05-31 PROCEDURE — 93325 DOPPLER ECHO COLOR FLOW MAPG: CPT

## 2022-05-31 PROCEDURE — 93320 DOPPLER ECHO COMPLETE: CPT | Performed by: INTERNAL MEDICINE

## 2022-05-31 PROCEDURE — 25010000002 MIDAZOLAM PER 1 MG: Performed by: INTERNAL MEDICINE

## 2022-05-31 PROCEDURE — 5A2204Z RESTORATION OF CARDIAC RHYTHM, SINGLE: ICD-10-PCS | Performed by: INTERNAL MEDICINE

## 2022-05-31 PROCEDURE — 99232 SBSQ HOSP IP/OBS MODERATE 35: CPT | Performed by: INTERNAL MEDICINE

## 2022-05-31 PROCEDURE — B24BZZ4 ULTRASONOGRAPHY OF HEART WITH AORTA, TRANSESOPHAGEAL: ICD-10-PCS | Performed by: INTERNAL MEDICINE

## 2022-05-31 RX ORDER — FENTANYL CITRATE 50 UG/ML
INJECTION, SOLUTION INTRAMUSCULAR; INTRAVENOUS
Status: COMPLETED | OUTPATIENT
Start: 2022-05-31 | End: 2022-05-31

## 2022-05-31 RX ORDER — MIDAZOLAM HYDROCHLORIDE 1 MG/ML
INJECTION INTRAMUSCULAR; INTRAVENOUS
Status: COMPLETED | OUTPATIENT
Start: 2022-05-31 | End: 2022-05-31

## 2022-05-31 RX ORDER — METOPROLOL SUCCINATE 25 MG/1
25 TABLET, EXTENDED RELEASE ORAL NIGHTLY
Status: DISCONTINUED | OUTPATIENT
Start: 2022-05-31 | End: 2022-06-01 | Stop reason: HOSPADM

## 2022-05-31 RX ORDER — SODIUM CHLORIDE 9 MG/ML
INJECTION, SOLUTION INTRAVENOUS
Status: COMPLETED | OUTPATIENT
Start: 2022-05-31 | End: 2022-05-31

## 2022-05-31 RX ADMIN — FENTANYL CITRATE 50 MCG: 0.05 INJECTION, SOLUTION INTRAMUSCULAR; INTRAVENOUS at 10:28

## 2022-05-31 RX ADMIN — MIDAZOLAM 1 MG: 1 INJECTION INTRAMUSCULAR; INTRAVENOUS at 10:32

## 2022-05-31 RX ADMIN — FENTANYL CITRATE 25 MCG: 0.05 INJECTION, SOLUTION INTRAMUSCULAR; INTRAVENOUS at 10:35

## 2022-05-31 RX ADMIN — PANTOPRAZOLE SODIUM 40 MG: 40 TABLET, DELAYED RELEASE ORAL at 06:47

## 2022-05-31 RX ADMIN — AMIODARONE HYDROCHLORIDE 1 MG/MIN: 1.8 INJECTION, SOLUTION INTRAVENOUS at 13:36

## 2022-05-31 RX ADMIN — METOPROLOL SUCCINATE 25 MG: 25 TABLET, EXTENDED RELEASE ORAL at 20:58

## 2022-05-31 RX ADMIN — CEFTRIAXONE 1 G: 1 INJECTION, POWDER, FOR SOLUTION INTRAMUSCULAR; INTRAVENOUS at 20:58

## 2022-05-31 RX ADMIN — APIXABAN 5 MG: 5 TABLET, FILM COATED ORAL at 07:51

## 2022-05-31 RX ADMIN — MIDAZOLAM 2 MG: 1 INJECTION INTRAMUSCULAR; INTRAVENOUS at 10:28

## 2022-05-31 RX ADMIN — METOPROLOL SUCCINATE 50 MG: 50 TABLET, EXTENDED RELEASE ORAL at 08:30

## 2022-05-31 RX ADMIN — AMIODARONE HYDROCHLORIDE 150 MG: 1.5 INJECTION, SOLUTION INTRAVENOUS at 10:53

## 2022-05-31 RX ADMIN — APIXABAN 5 MG: 5 TABLET, FILM COATED ORAL at 20:58

## 2022-05-31 RX ADMIN — Medication 30 MG: at 10:44

## 2022-05-31 RX ADMIN — AMIODARONE HYDROCHLORIDE 1 MG/MIN: 1.8 INJECTION, SOLUTION INTRAVENOUS at 18:29

## 2022-05-31 RX ADMIN — FENTANYL CITRATE 25 MCG: 0.05 INJECTION, SOLUTION INTRAMUSCULAR; INTRAVENOUS at 10:32

## 2022-05-31 RX ADMIN — MULTIPLE VITAMINS W/ MINERALS TAB 1 TABLET: TAB at 08:30

## 2022-05-31 RX ADMIN — MIDAZOLAM 1 MG: 1 INJECTION INTRAMUSCULAR; INTRAVENOUS at 10:35

## 2022-05-31 RX ADMIN — METOPROLOL TARTRATE 5 MG: 1 INJECTION, SOLUTION INTRAVENOUS at 07:50

## 2022-05-31 RX ADMIN — MIDAZOLAM 1 MG: 1 INJECTION INTRAMUSCULAR; INTRAVENOUS at 10:38

## 2022-05-31 RX ADMIN — FUROSEMIDE 40 MG: 40 TABLET ORAL at 08:30

## 2022-05-31 RX ADMIN — ATORVASTATIN CALCIUM 10 MG: 20 TABLET, FILM COATED ORAL at 20:57

## 2022-05-31 RX ADMIN — SODIUM CHLORIDE 75 ML/HR: 9 INJECTION, SOLUTION INTRAVENOUS at 10:10

## 2022-06-01 ENCOUNTER — READMISSION MANAGEMENT (OUTPATIENT)
Dept: CALL CENTER | Facility: HOSPITAL | Age: 67
End: 2022-06-01

## 2022-06-01 VITALS
OXYGEN SATURATION: 98 % | SYSTOLIC BLOOD PRESSURE: 131 MMHG | WEIGHT: 182.3 LBS | DIASTOLIC BLOOD PRESSURE: 85 MMHG | TEMPERATURE: 98 F | RESPIRATION RATE: 16 BRPM | HEART RATE: 69 BPM | HEIGHT: 72 IN | BODY MASS INDEX: 24.69 KG/M2

## 2022-06-01 PROBLEM — I50.21 ACUTE SYSTOLIC HEART FAILURE: Status: ACTIVE | Noted: 2022-06-01

## 2022-06-01 PROBLEM — I48.19 ATRIAL FIBRILLATION, PERSISTENT: Status: ACTIVE | Noted: 2022-06-01

## 2022-06-01 LAB
ANION GAP SERPL CALCULATED.3IONS-SCNC: 12 MMOL/L (ref 5–15)
BASOPHILS # BLD AUTO: 0.07 10*3/MM3 (ref 0–0.2)
BASOPHILS NFR BLD AUTO: 0.8 % (ref 0–1.5)
BUN SERPL-MCNC: 21 MG/DL (ref 8–23)
BUN/CREAT SERPL: 21 (ref 7–25)
CALCIUM SPEC-SCNC: 8.6 MG/DL (ref 8.6–10.5)
CHLORIDE SERPL-SCNC: 101 MMOL/L (ref 98–107)
CO2 SERPL-SCNC: 26 MMOL/L (ref 22–29)
CREAT SERPL-MCNC: 1 MG/DL (ref 0.76–1.27)
DEPRECATED RDW RBC AUTO: 47.6 FL (ref 37–54)
EGFRCR SERPLBLD CKD-EPI 2021: 82 ML/MIN/1.73
EOSINOPHIL # BLD AUTO: 0.46 10*3/MM3 (ref 0–0.4)
EOSINOPHIL NFR BLD AUTO: 5.3 % (ref 0.3–6.2)
ERYTHROCYTE [DISTWIDTH] IN BLOOD BY AUTOMATED COUNT: 13.9 % (ref 12.3–15.4)
GLUCOSE BLDC GLUCOMTR-MCNC: 91 MG/DL (ref 70–130)
GLUCOSE SERPL-MCNC: 95 MG/DL (ref 65–99)
HCT VFR BLD AUTO: 46.2 % (ref 37.5–51)
HGB BLD-MCNC: 15.1 G/DL (ref 13–17.7)
IMM GRANULOCYTES # BLD AUTO: 0.03 10*3/MM3 (ref 0–0.05)
IMM GRANULOCYTES NFR BLD AUTO: 0.3 % (ref 0–0.5)
LYMPHOCYTES # BLD AUTO: 1.8 10*3/MM3 (ref 0.7–3.1)
LYMPHOCYTES NFR BLD AUTO: 20.7 % (ref 19.6–45.3)
MCH RBC QN AUTO: 30.3 PG (ref 26.6–33)
MCHC RBC AUTO-ENTMCNC: 32.7 G/DL (ref 31.5–35.7)
MCV RBC AUTO: 92.6 FL (ref 79–97)
MONOCYTES # BLD AUTO: 0.94 10*3/MM3 (ref 0.1–0.9)
MONOCYTES NFR BLD AUTO: 10.8 % (ref 5–12)
NEUTROPHILS NFR BLD AUTO: 5.39 10*3/MM3 (ref 1.7–7)
NEUTROPHILS NFR BLD AUTO: 62.1 % (ref 42.7–76)
NRBC BLD AUTO-RTO: 0 /100 WBC (ref 0–0.2)
PLATELET # BLD AUTO: 181 10*3/MM3 (ref 140–450)
PMV BLD AUTO: 12.5 FL (ref 6–12)
POTASSIUM SERPL-SCNC: 3.2 MMOL/L (ref 3.5–5.2)
QT INTERVAL: 454 MS
RBC # BLD AUTO: 4.99 10*6/MM3 (ref 4.14–5.8)
SODIUM SERPL-SCNC: 139 MMOL/L (ref 136–145)
WBC NRBC COR # BLD: 8.69 10*3/MM3 (ref 3.4–10.8)

## 2022-06-01 PROCEDURE — 80048 BASIC METABOLIC PNL TOTAL CA: CPT | Performed by: INTERNAL MEDICINE

## 2022-06-01 PROCEDURE — 93005 ELECTROCARDIOGRAM TRACING: CPT | Performed by: INTERNAL MEDICINE

## 2022-06-01 PROCEDURE — 99233 SBSQ HOSP IP/OBS HIGH 50: CPT | Performed by: INTERNAL MEDICINE

## 2022-06-01 PROCEDURE — 25010000002 AMIODARONE IN DEXTROSE 5% 360-4.14 MG/200ML-% SOLUTION: Performed by: INTERNAL MEDICINE

## 2022-06-01 PROCEDURE — 82962 GLUCOSE BLOOD TEST: CPT

## 2022-06-01 PROCEDURE — 85025 COMPLETE CBC W/AUTO DIFF WBC: CPT | Performed by: INTERNAL MEDICINE

## 2022-06-01 PROCEDURE — 93010 ELECTROCARDIOGRAM REPORT: CPT | Performed by: INTERNAL MEDICINE

## 2022-06-01 RX ORDER — AMIODARONE HYDROCHLORIDE 200 MG/1
TABLET ORAL
Qty: 45 TABLET | Refills: 1 | Status: SHIPPED | OUTPATIENT
Start: 2022-06-01 | End: 2022-06-27

## 2022-06-01 RX ORDER — FUROSEMIDE 40 MG/1
40 TABLET ORAL DAILY
Qty: 30 TABLET | Refills: 1 | Status: SHIPPED | OUTPATIENT
Start: 2022-06-02 | End: 2022-06-27 | Stop reason: SDUPTHER

## 2022-06-01 RX ORDER — METOPROLOL SUCCINATE 25 MG/1
TABLET, EXTENDED RELEASE ORAL
Qty: 90 TABLET | Refills: 1 | Status: SHIPPED | OUTPATIENT
Start: 2022-06-01 | End: 2022-06-27

## 2022-06-01 RX ORDER — LISINOPRIL 5 MG/1
5 TABLET ORAL DAILY
Qty: 30 TABLET | Refills: 1 | Status: SHIPPED | OUTPATIENT
Start: 2022-06-01 | End: 2022-06-27 | Stop reason: SDUPTHER

## 2022-06-01 RX ORDER — AMIODARONE HYDROCHLORIDE 200 MG/1
200 TABLET ORAL EVERY 12 HOURS SCHEDULED
Status: DISCONTINUED | OUTPATIENT
Start: 2022-06-01 | End: 2022-06-01 | Stop reason: HOSPADM

## 2022-06-01 RX ORDER — POTASSIUM CHLORIDE 750 MG/1
10 TABLET, FILM COATED, EXTENDED RELEASE ORAL DAILY
Qty: 30 TABLET | Refills: 1 | Status: SHIPPED | OUTPATIENT
Start: 2022-06-01 | End: 2022-06-27 | Stop reason: SDUPTHER

## 2022-06-01 RX ADMIN — AMIODARONE HYDROCHLORIDE 0.5 MG/MIN: 1.8 INJECTION, SOLUTION INTRAVENOUS at 05:17

## 2022-06-01 RX ADMIN — METOPROLOL SUCCINATE 50 MG: 50 TABLET, EXTENDED RELEASE ORAL at 08:35

## 2022-06-01 RX ADMIN — FUROSEMIDE 40 MG: 40 TABLET ORAL at 08:35

## 2022-06-01 RX ADMIN — PANTOPRAZOLE SODIUM 40 MG: 40 TABLET, DELAYED RELEASE ORAL at 06:27

## 2022-06-01 RX ADMIN — POTASSIUM CHLORIDE 40 MEQ: 750 TABLET, EXTENDED RELEASE ORAL at 08:35

## 2022-06-01 RX ADMIN — Medication 10 ML: at 08:35

## 2022-06-01 RX ADMIN — MULTIPLE VITAMINS W/ MINERALS TAB 1 TABLET: TAB at 08:35

## 2022-06-01 RX ADMIN — AMIODARONE HYDROCHLORIDE 200 MG: 200 TABLET ORAL at 08:35

## 2022-06-01 RX ADMIN — APIXABAN 5 MG: 5 TABLET, FILM COATED ORAL at 08:35

## 2022-06-01 NOTE — PROGRESS NOTES
[facility]: Patient Counseling Consult    Tesfaye Smith has been counseled on the following medications: amiodarone, furosemide, lisinopril, metoprolol XL, and KCL. Counseling points included the following:    Explained indication of each medication, and patient's need for these medications.  Went over dosing and frequency of each medication.  Discussed any special administration, storage, or monitoring instructions with medications.  Discussed all important drug interactions, including over-the-counter medications and supplements.  Explained possible side effects for each medication.  Instructed the patient not to begin or discontinue any medications without informing his/her physician/pharmacist.    Patient and wife expressed understanding and had no further questions.      Latricia Mars, Pharm.D., [facility]   Clinical Pharmacist  Phone Extension #[phone]

## 2022-06-01 NOTE — DISCHARGE SUMMARY
PHYSICIAN DISCHARGE SUMMARY                                                                        [facility]    Patient Identification:  Name: Tesfaye Smith  Age: 66 y.o.  Sex: male  :  1955  MRN: [id number]  Primary Care Physician: Gina Hung DO    Admit date: 2022  Discharge date and time:2022  Discharged Condition: good    Discharge Diagnoses:  Active Hospital Problems    Diagnosis  POA    **Atrial fibrillation, persistent (HCC) [I48.19]  Yes    Acute systolic heart failure (CMS/HCC) [I50.21]  Yes    Acute on chronic congestive heart failure (HCC) [I50.9]  Yes    Arthritis [M19.90]  Yes    Hyperlipidemia [E78.5]  Yes    Atrial fibrillation with RVR (HCC) [I48.91]  Yes    Gastroesophageal reflux disease [K21.9]  Yes    Accelerated hypertension [I10]  Yes      Resolved Hospital Problems   No resolved problems to display.      Acute on chronic systolic CHF.    PMHX:   Past Medical History:   Diagnosis Date    Arthritis     Atrial fibrillation (HCC)     Almaraz's esophagus     Bunion of right foot     Dislocation, metatarsophalangeal 2016    Eczema     Esophageal reflux     Hyperlipidemia     Hypertension     PONV (postoperative nausea and vomiting)      PSHX:   Past Surgical History:   Procedure Laterality Date    BUNIONECTOMY Right     RIGHT GREAT TOE    FOOT FUSION Right 2017    Procedure: ARTHRODESIS first METATARSALPHALANGEAL JOINT with second metatarsophalageal joint release and second metatarsal osteotomy or resection and second hammertoe correction with proximal phalangeal joint resection of right foot;  Surgeon: Jagdeep Medrano MD;  [place] [facility] [place] [facility] [place]:     HERNIA REPAIR         Hospital Course: Tesfaye Smith  Is a 66 year old gentleman who presented to the emergency room with shortness of breath which started a week ago; it is usually worse at night when he tries to  sleep; no chest pain; he is followed by a cardiologist who has been trying to adjust his medications; he is scheduled for a cardioversion on June 6th; he denies fever or chills         The patient was admitted to hospital and seen by cardiology.  Patient was given some diuretics and diuresed for fluid overload heart failure.  The patient underwent cardioversion and had adjustment of medications.  He was able to convert back to sinus rhythm was doing better after being in the hospital for few days.  He will follow-up with cardiology and also follow-up with his primary care doctor.    Consults:     Consults       Date and Time Order Name Status Description    5/28/2022  8:19 PM Inpatient Cardiology Consult Completed     5/28/2022  4:18 PM [facility] (on-call MD unless specified) Details      5/28/2022  3:59 PM [facility] (on-call MD unless specified)            Results from last 7 days   Lab Units 06/01/22  0305   WBC 10*3/mm3 8.69   HEMOGLOBIN g/dL 15.1   HEMATOCRIT % 46.2   PLATELETS 10*3/mm3 181     Results from last 7 days   Lab Units 06/01/22  0305   SODIUM mmol/L 139   POTASSIUM mmol/L 3.2*   CHLORIDE mmol/L 101   CO2 mmol/L 26.0   BUN mg/dL 21   CREATININE mg/dL 1.00   GLUCOSE mg/dL 95   CALCIUM mg/dL 8.6     Significant Diagnostic Studies:   WBC   Date Value Ref Range Status   06/01/2022 8.69 3.40 - 10.80 10*3/mm3 Final     Hemoglobin   Date Value Ref Range Status   06/01/2022 15.1 13.0 - 17.7 g/dL Final     Hematocrit   Date Value Ref Range Status   06/01/2022 46.2 37.5 - 51.0 % Final     Platelets   Date Value Ref Range Status   06/01/2022 181 140 - 450 10*3/mm3 Final     Sodium   Date Value Ref Range Status   06/01/2022 139 136 - 145 mmol/L Final     Potassium   Date Value Ref Range Status   06/01/2022 3.2 (L) 3.5 - 5.2 mmol/L Final     Chloride   Date Value Ref Range Status   06/01/2022 101 98 - 107 mmol/L Final     CO2   Date Value Ref Range Status   06/01/2022 26.0 22.0 - 29.0 mmol/L Final     BUN   Date Value Ref  Range Status   06/01/2022 21 8 - 23 mg/dL Final     Creatinine   Date Value Ref Range Status   06/01/2022 1.00 0.76 - 1.27 mg/dL Final     Glucose   Date Value Ref Range Status   06/01/2022 95 65 - 99 mg/dL Final     Calcium   Date Value Ref Range Status   06/01/2022 8.6 8.6 - 10.5 mg/dL Final     No results found for: AST, ALT, ALKPHOS  INR   Date Value Ref Range Status   05/31/2022 1.40 (H) 0.90 - 1.10 Final     No results found for: COLORU, CLARITYU, SPECGRAV, PHUR, PROTEINUR, GLUCOSEU, KETONESU, BLOODU, NITRITE, LEUKOCYTESUR, BILIRUBINUR, UROBILINOGEN, RBCUA, WBCUA, BACTERIA, UACOMMENT  No results found for: TROPONINT, TROPONINI, BNP  No components found for: HGBA1C;2  No components found for: TSH;2  Imaging Results (All)       Procedure Component Value Units Date/Time    XR Chest 1 View [[id number]] Collected: 05/28/22 1444     Updated: 05/28/22 1500    Narrative:      SINGLE VIEW CHEST RADIOGRAPH     HISTORY: 66-year-old male with a history of shortness of air.     FINDINGS: An AP chest radiograph was obtained and demonstrates  interstitial opacification at both lung bases. The cardiomediastinal  silhouette and osseous structures appear normal. No evidence for a  pneumothorax or pleural effusion is appreciated.       Impression:      Bibasilar interstitial edema and/or pneumonia.     This report was finalized on 5/28/2022 2:57 PM by Dr. Stevenson Mendes M.D.             Lab Results (last 7 days)       Procedure Component Value Units Date/Time    POC Glucose Once [[id number]]  (Normal) Collected: 06/01/22 0547    Specimen: Blood Updated: 06/01/22 0549     Glucose 91 mg/dL      Comment: Meter: [id number] : [id number] Lucía WALLACE       Basic Metabolic Panel [[id number]]  (Abnormal) Collected: 06/01/22 0305    Specimen: Blood Updated: 06/01/22 0500     Glucose 95 mg/dL      BUN 21 mg/dL      Creatinine 1.00 mg/dL      Sodium 139 mmol/L      Potassium 3.2 mmol/L      Chloride 101 mmol/L      CO2 26.0 mmol/L       Calcium 8.6 mg/dL      BUN/Creatinine Ratio 21.0     Anion Gap 12.0 mmol/L      eGFR 82.0 mL/min/1.73      Comment: National Kidney Foundation and American Society of Nephrology (ASN) Task Force recommended calculation based on the Chronic Kidney Disease Epidemiology Collaboration (CKD-EPI) equation refit without adjustment for race.       Narrative:      GFR Normal >60  Chronic Kidney Disease <60  Kidney Failure <15      CBC & Differential [[id number]]  (Abnormal) Collected: 06/01/22 0305    Specimen: Blood Updated: 06/01/22 0432    Narrative:      The following orders were created for panel order CBC & Differential.  Procedure                               Abnormality         Status                     ---------                               -----------         ------                     CBC Auto Differential[[id number]]        Abnormal            Final result                 Please view results for these tests on the individual orders.    CBC Auto Differential [[id number]]  (Abnormal) Collected: 06/01/22 0305    Specimen: Blood Updated: 06/01/22 0432     WBC 8.69 10*3/mm3      RBC 4.99 10*6/mm3      Hemoglobin 15.1 g/dL      Hematocrit 46.2 %      MCV 92.6 fL      MCH 30.3 pg      MCHC 32.7 g/dL      RDW 13.9 %      RDW-SD 47.6 fl      MPV 12.5 fL      Platelets 181 10*3/mm3      Neutrophil % 62.1 %      Lymphocyte % 20.7 %      Monocyte % 10.8 %      Eosinophil % 5.3 %      Basophil % 0.8 %      Immature Grans % 0.3 %      Neutrophils, Absolute 5.39 10*3/mm3      Lymphocytes, Absolute 1.80 10*3/mm3      Monocytes, Absolute 0.94 10*3/mm3      Eosinophils, Absolute 0.46 10*3/mm3      Basophils, Absolute 0.07 10*3/mm3      Immature Grans, Absolute 0.03 10*3/mm3      nRBC 0.0 /100 WBC     POC Glucose Once [[id number]]  (Normal) Collected: 05/31/22 2053    Specimen: Blood Updated: 05/31/22 2055     Glucose 103 mg/dL      Comment: Meter: [id number] : [id number] Lucía WALLACE       POC Glucose Once [[id number]]   (Normal) Collected: 05/31/22 1558    Specimen: Blood Updated: 05/31/22 1559     Glucose 95 mg/dL      Comment: Meter: [id number] : [id number] Peters Shahida NST       Protime-INR [[id number]]  (Abnormal) Collected: 05/31/22 1241    Specimen: Blood Updated: 05/31/22 1348     Protime 16.9 Seconds      INR 1.40    POC Glucose Once [[id number]]  (Normal) Collected: 05/31/22 1230    Specimen: Blood Updated: 05/31/22 1231     Glucose 99 mg/dL      Comment: Meter: [id number] : [id number] Peters Shahida NST       POC Glucose Once [[id number]]  (Normal) Collected: 05/31/22 0623    Specimen: Blood Updated: 05/31/22 0624     Glucose 97 mg/dL      Comment: Meter: [id number] : [id number] Lucía Araujo M[place]       Basic Metabolic Panel [[id number]]  (Abnormal) Collected: 05/31/22 0319    Specimen: Blood Updated: 05/31/22 0427     Glucose 83 mg/dL      BUN 23 mg/dL      Creatinine 1.35 mg/dL      Sodium 143 mmol/L      Potassium 3.9 mmol/L      Chloride 103 mmol/L      CO2 30.0 mmol/L      Calcium 9.0 mg/dL      BUN/Creatinine Ratio 17.0     Anion Gap 10.0 mmol/L      eGFR 57.2 mL/min/1.73      Comment: National Kidney Foundation and American Society of Nephrology (ASN) Task Force recommended calculation based on the Chronic Kidney Disease Epidemiology Collaboration (CKD-EPI) equation refit without adjustment for race.       Narrative:      GFR Normal >60  Chronic Kidney Disease <60  Kidney Failure <15      CBC & Differential [[id number]]  (Abnormal) Collected: 05/31/22 0319    Specimen: Blood Updated: 05/31/22 0408    Narrative:      The following orders were created for panel order CBC & Differential.  Procedure                               Abnormality         Status                     ---------                               -----------         ------                     CBC Auto Differential[[id number]]        Abnormal            Final result                 Please view results for these tests on the individual orders.     CBC Auto Differential [[id number]]  (Abnormal) Collected: 05/31/22 0319    Specimen: Blood Updated: 05/31/22 0408     WBC 10.12 10*3/mm3      RBC 5.11 10*6/mm3      Hemoglobin 15.8 g/dL      Hematocrit 47.2 %      MCV 92.4 fL      MCH 30.9 pg      MCHC 33.5 g/dL      RDW 13.9 %      RDW-SD 48.6 fl      MPV 12.2 fL      Platelets 188 10*3/mm3      Neutrophil % 66.1 %      Lymphocyte % 17.2 %      Monocyte % 10.9 %      Eosinophil % 4.6 %      Basophil % 0.8 %      Immature Grans % 0.4 %      Neutrophils, Absolute 6.69 10*3/mm3      Lymphocytes, Absolute 1.74 10*3/mm3      Monocytes, Absolute 1.10 10*3/mm3      Eosinophils, Absolute 0.47 10*3/mm3      Basophils, Absolute 0.08 10*3/mm3      Immature Grans, Absolute 0.04 10*3/mm3      nRBC 0.5 /100 WBC     POC Glucose Once [[id number]]  (Normal) Collected: 05/30/22 2002    Specimen: Blood Updated: 05/30/22 2010     Glucose 92 mg/dL      Comment: Meter: [id number] : [id number] Mick WARNER       COVID PRE-OP / PRE-PROCEDURE SCREENING ORDER (NO ISOLATION) - Swab, Nasopharynx [[id number]]  (Normal) Collected: 05/30/22 1550    Specimen: Swab from Nasopharynx Updated: 05/30/22 1826    Narrative:      The following orders were created for panel order COVID PRE-OP / PRE-PROCEDURE SCREENING ORDER (NO ISOLATION) - Swab, Nasopharynx.  Procedure                               Abnormality         Status                     ---------                               -----------         ------                     COVID-19,[facility]-HOU[facility]...[[id number]]  Normal              Final result                 Please view results for these tests on the individual orders.    COVID-19,[facility]-HOU[facility] CEPHEID/TAMMI NP SWAB IN TRANSPORT MEDIA 8-12 HR TAT - Swab, Nasopharynx [[id number]]  (Normal) Collected: 05/30/22 1550    Specimen: Swab from Nasopharynx Updated: 05/30/22 1826     COVID19 Not Detected    Narrative:      Fact sheet for providers: https://www.fda.gov/media/391459/download     Fact  sheet for patients: https://www.fda.gov/media/257133/download    POC Glucose Once [[id number]]  (Normal) Collected: 05/30/22 1602    Specimen: Blood Updated: 05/30/22 1603     Glucose 100 mg/dL      Comment: Meter: [id number] : [id number] Meri Mae NA       POC Glucose Once [[id number]]  (Normal) Collected: 05/30/22 1050    Specimen: Blood Updated: 05/30/22 1051     Glucose 88 mg/dL      Comment: Meter: [id number] : [id number] Jermain Araujo NA       POC Glucose Once [[id number]]  (Normal) Collected: 05/30/22 0557    Specimen: Blood Updated: 05/30/22 0558     Glucose 93 mg/dL      Comment: Meter: [id number] : [id number] Torito Lewis NA       Basic Metabolic Panel [[id number]]  (Abnormal) Collected: 05/30/22 0310    Specimen: Blood Updated: 05/30/22 0356     Glucose 100 mg/dL      BUN 23 mg/dL      Creatinine 1.32 mg/dL      Sodium 141 mmol/L      Potassium 3.7 mmol/L      Chloride 101 mmol/L      CO2 28.0 mmol/L      Calcium 8.8 mg/dL      BUN/Creatinine Ratio 17.4     Anion Gap 12.0 mmol/L      eGFR 58.8 mL/min/1.73      Comment: National Kidney Foundation and American Society of Nephrology (ASN) Task Force recommended calculation based on the Chronic Kidney Disease Epidemiology Collaboration (CKD-EPI) equation refit without adjustment for race.       Narrative:      GFR Normal >60  Chronic Kidney Disease <60  Kidney Failure <15      CBC & Differential [[id number]]  (Abnormal) Collected: 05/30/22 0310    Specimen: Blood Updated: 05/30/22 0334    Narrative:      The following orders were created for panel order CBC & Differential.  Procedure                               Abnormality         Status                     ---------                               -----------         ------                     CBC Auto Differential[[id number]]        Abnormal            Final result                 Please view results for these tests on the individual orders.    CBC Auto Differential [[id number]]  (Abnormal) Collected:  05/30/22 0310    Specimen: Blood Updated: 05/30/22 0334     WBC 10.16 10*3/mm3      RBC 4.91 10*6/mm3      Hemoglobin 15.0 g/dL      Hematocrit 43.6 %      MCV 88.8 fL      MCH 30.5 pg      MCHC 34.4 g/dL      RDW 13.3 %      RDW-SD 43.7 fl      MPV 12.5 fL      Platelets 169 10*3/mm3      Neutrophil % 66.7 %      Lymphocyte % 15.6 %      Monocyte % 11.8 %      Eosinophil % 4.8 %      Basophil % 0.7 %      Immature Grans % 0.4 %      Neutrophils, Absolute 6.78 10*3/mm3      Lymphocytes, Absolute 1.58 10*3/mm3      Monocytes, Absolute 1.20 10*3/mm3      Eosinophils, Absolute 0.49 10*3/mm3      Basophils, Absolute 0.07 10*3/mm3      Immature Grans, Absolute 0.04 10*3/mm3      nRBC 0.0 /100 WBC     POC Glucose Once [[id number]]  (Normal) Collected: 05/29/22 2037    Specimen: Blood Updated: 05/29/22 2039     Glucose 100 mg/dL      Comment: Meter: [id number] : [id number] Torito Lewis NA       Potassium [[id number]]  (Normal) Collected: 05/29/22 1902    Specimen: Blood Updated: 05/29/22 1931     Potassium 4.5 mmol/L     POC Glucose Once [[id number]]  (Normal) Collected: 05/29/22 1620    Specimen: Blood Updated: 05/29/22 1622     Glucose 91 mg/dL      Comment: Meter: [id number] : [id number] Tom Jane NA       POC Glucose Once [[id number]]  (Normal) Collected: 05/29/22 1111    Specimen: Blood Updated: 05/29/22 1112     Glucose 113 mg/dL      Comment: Meter: [id number] : [id number] Tom Jane NA       Hemoglobin A1c [[id number]]  (Abnormal) Collected: 05/29/22 0251    Specimen: Blood Updated: 05/29/22 0400     Hemoglobin A1C 5.80 %     Narrative:      Hemoglobin A1C Ranges:    Increased Risk for Diabetes  5.7% to 6.4%  Diabetes                     >= 6.5%  Diabetic Goal                < 7.0%    Basic Metabolic Panel [[id number]]  (Abnormal) Collected: 05/29/22 0251    Specimen: Blood Updated: 05/29/22 0343     Glucose 101 mg/dL      BUN 17 mg/dL      Creatinine 1.10 mg/dL      Sodium 140 mmol/L       Potassium 3.0 mmol/L      Chloride 98 mmol/L      CO2 29.5 mmol/L      Calcium 8.8 mg/dL      BUN/Creatinine Ratio 15.5     Anion Gap 12.5 mmol/L      eGFR 73.1 mL/min/1.73      Comment: National Kidney Foundation and American Society of Nephrology (ASN) Task Force recommended calculation based on the Chronic Kidney Disease Epidemiology Collaboration (CKD-EPI) equation refit without adjustment for race.       Narrative:      GFR Normal >60  Chronic Kidney Disease <60  Kidney Failure <15      Troponin [[id number]]  (Normal) Collected: 05/29/22 0251    Specimen: Blood Updated: 05/29/22 0330     Troponin T <0.010 ng/mL     Narrative:      Troponin T Reference Range:  <= 0.03 ng/mL-   Negative for AMI  >0.03 ng/mL-     Abnormal for myocardial necrosis.  Clinicians would have to utilize clinical acumen, EKG, Troponin and serial changes to determine if it is an Acute Myocardial Infarction or myocardial injury due to an underlying chronic condition.       Results may be falsely decreased if patient taking Biotin.      CBC (No Diff) [[id number]]  (Abnormal) Collected: 05/29/22 0251    Specimen: Blood Updated: 05/29/22 0311     WBC 11.14 10*3/mm3      RBC 4.68 10*6/mm3      Hemoglobin 14.5 g/dL      Hematocrit 42.9 %      MCV 91.7 fL      MCH 31.0 pg      MCHC 33.8 g/dL      RDW 14.5 %      RDW-SD 49.2 fl      MPV 12.3 fL      Platelets 162 10*3/mm3     Respiratory Panel PCR w/COVID-19(SARS-CoV-2) [facility], NP Swab in [facility], 3-4 HR TAT - Swab, Nasopharynx [[id number]]  (Normal) Collected: 05/28/22 1515    Specimen: Swab from Nasopharynx Updated: 05/28/22 1626     ADENOVIRUS, PCR Not Detected     Coronavirus 229E Not Detected     Coronavirus HKU1 Not Detected     Coronavirus NL63 Not Detected     Coronavirus OC43 Not Detected     COVID19 Not Detected     Human Metapneumovirus Not Detected     Human Rhinovirus/Enterovirus Not Detected     Influenza A PCR Not Detected     Influenza B PCR Not Detected      Parainfluenza Virus 1 Not Detected     Parainfluenza Virus 2 Not Detected     Parainfluenza Virus 3 Not Detected     Parainfluenza Virus 4 Not Detected     RSV, PCR Not Detected     Bordetella pertussis pcr Not Detected     Bordetella parapertussis PCR Not Detected     Chlamydophila pneumoniae PCR Not Detected     Mycoplasma pneumo by PCR Not Detected    Narrative:      In the setting of a positive respiratory panel with a viral infection PLUS a negative procalcitonin without other underlying concern for bacterial infection, consider observing off antibiotics or discontinuation of antibiotics and continue supportive care. If the respiratory panel is positive for atypical bacterial infection (Bordetella pertussis, Chlamydophila pneumoniae, or Mycoplasma pneumoniae), consider antibiotic de-escalation to target atypical bacterial infection.    Comprehensive Metabolic Panel [[id number]]  (Abnormal) Collected: 05/28/22 1520    Specimen: Blood Updated: 05/28/22 1558     Glucose 106 mg/dL      BUN 17 mg/dL      Creatinine 1.18 mg/dL      Sodium 137 mmol/L      Potassium 3.9 mmol/L      Comment: Slight hemolysis detected by analyzer. Results may be affected.        Chloride 99 mmol/L      CO2 25.3 mmol/L      Calcium 9.7 mg/dL      Total Protein 7.2 g/dL      Albumin 4.20 g/dL      ALT (SGPT) 45 U/L      AST (SGOT) 31 U/L      Alkaline Phosphatase 89 U/L      Total Bilirubin 1.6 mg/dL      Globulin 3.0 gm/dL      A/G Ratio 1.4 g/dL      BUN/Creatinine Ratio 14.4     Anion Gap 12.7 mmol/L      eGFR 67.2 mL/min/1.73      Comment: National Kidney Foundation and American Society of Nephrology (ASN) Task Force recommended calculation based on the Chronic Kidney Disease Epidemiology Collaboration (CKD-EPI) equation refit without adjustment for race.       Narrative:      GFR Normal >60  Chronic Kidney Disease <60  Kidney Failure <15      Troponin [[id number]]  (Normal) Collected: 05/28/22 1520    Specimen: Blood Updated:  05/28/22 1558     Troponin T <0.010 ng/mL     Narrative:      Troponin T Reference Range:  <= 0.03 ng/mL-   Negative for AMI  >0.03 ng/mL-     Abnormal for myocardial necrosis.  Clinicians would have to utilize clinical acumen, EKG, Troponin and serial changes to determine if it is an Acute Myocardial Infarction or myocardial injury due to an underlying chronic condition.       Results may be falsely decreased if patient taking Biotin.      BNP [[id number]]  (Abnormal) Collected: 05/28/22 1520    Specimen: Blood Updated: 05/28/22 1556     proBNP 7,686.0 pg/mL     Narrative:      Among patients with dyspnea, NT-proBNP is highly sensitive for the detection of acute congestive heart failure. In addition NT-proBNP of <300 pg/ml effectively rules out acute congestive heart failure with 99% negative predictive value.    Results may be falsely decreased if patient taking Biotin.      CBC & Differential [[id number]]  (Abnormal) Collected: 05/28/22 1520    Specimen: Blood Updated: 05/28/22 1533    Narrative:      The following orders were created for panel order CBC & Differential.  Procedure                               Abnormality         Status                     ---------                               -----------         ------                     CBC Auto Differential[[id number]]        Abnormal            Final result                 Please view results for these tests on the individual orders.    CBC Auto Differential [[id number]]  (Abnormal) Collected: 05/28/22 1520    Specimen: Blood Updated: 05/28/22 1533     WBC 13.42 10*3/mm3      RBC 4.80 10*6/mm3      Hemoglobin 14.7 g/dL      Hematocrit 44.7 %      MCV 93.1 fL      MCH 30.6 pg      MCHC 32.9 g/dL      RDW 13.9 %      RDW-SD 47.5 fl      MPV 12.6 fL      Platelets 185 10*3/mm3      Neutrophil % 83.7 %      Lymphocyte % 7.5 %      Monocyte % 8.0 %      Eosinophil % 0.3 %      Basophil % 0.4 %      Immature Grans % 0.1 %      Neutrophils, Absolute 11.21 10*3/mm3   "    Lymphocytes, Absolute 1.01 10*3/mm3      Monocytes, Absolute 1.08 10*3/mm3      Eosinophils, Absolute 0.04 10*3/mm3      Basophils, Absolute 0.06 10*3/mm3      Immature Grans, Absolute 0.02 10*3/mm3      nRBC 0.0 /100 WBC           /91 (BP Location: Right arm, Patient Position: Lying)   Pulse 69   Temp 98 °F (36.7 °C) (Oral)   Resp 16   Ht 182.9 cm (72\")   Wt 82.7 kg (182 lb 4.8 oz)   SpO2 98%   BMI 24.72 kg/m²     Discharge Exam:  General Appearance:    Alert, cooperative, no distress                          Head:    Normocephalic, without obvious abnormality, atraumatic                          Eyes:                            Throat:   Lips, tongue, gums normal                          Neck:   Supple, symmetrical, trachea midline, no JVD                        Lungs:     Clear to auscultation bilaterally, respirations unlabored                Chest Wall:    No tenderness or deformity                        Heart:    Regular rate and rhythm, S1 and S2 normal, no murmur,no  Rub or gallop                  Abdomen:     Soft, non-tender, bowel sounds active, no masses, no organomegaly                  Extremities:   Extremities normal, atraumatic, no cyanosis or edema                             Skin:   Skin is warm and dry,  no rashes or palpable lesions                  Neurologic:   no focal deficits noted     Disposition:  Home    Patient Instructions:      Discharge Medications        New Medications        Instructions Start Date   amiodarone 200 MG tablet  Commonly known as: PACERONE   Take 1 tablet by mouth 2 (Two) Times a Day for 7 days, THEN 1 tablet Daily.   Start Date: June 1, 2022     furosemide 40 MG tablet  Commonly known as: LASIX   40 mg, Oral, Daily   Start Date: June 2, 2022     metoprolol succinate XL 25 MG 24 hr tablet  Commonly known as: TOPROL-XL   Take 2 tablets by mouth Every Morning AND 1 tablet Every Night.      potassium chloride 10 MEQ CR tablet   10 mEq, Oral, Daily    "          Changes to Medications        Instructions Start Date   lisinopril 5 MG tablet  Commonly known as: PRINIVIL,ZESTRIL  What changed:   medication strength  how much to take   5 mg, Oral, Daily             Continue These Medications        Instructions Start Date   apixaban 5 MG tablet tablet  Commonly known as: ELIQUIS   5 mg, Oral, Every 12 Hours Scheduled      atorvastatin 10 MG tablet  Commonly known as: LIPITOR   10 mg, Oral, Daily      Centrum Silver Adult 50+ tablet tablet  Generic drug: multivitamin with minerals   1 tablet, Oral, Daily      esomeprazole 40 MG capsule  Commonly known as: nexIUM   40 mg, Oral, Every Morning Before Breakfast             Stop These Medications      lisinopril-hydrochlorothiazide 20-25 MG per tablet  Commonly known as: PRINZIDE,ZESTORETIC     metoprolol tartrate 50 MG tablet  Commonly known as: LOPRESSOR            Future Appointments   Date Time Provider Department Center   6/3/2022  9:30 AM [facility] - BP/EKG/HR [facility]   6/8/2022  9:30 AM Zuly Meek APRN [facility]     Additional Instructions for the Follow-ups that You Need to Schedule       Discharge Follow-up with Specified Provider: NERY Stanton [facility]; 1 Week   As directed      To: NERY Stanton [facility]    Follow Up: 1 Week    Follow Up Details: 06/08/2022 at 9:30 AM. Please call the office to reschedule if this time does not work for you.         Discharge Follow-up with Specified Provider: EKG Only at [facility]; 2 Days   As directed      To: EKG Only at [facility]    Follow Up: 2 Days    Follow Up Details: Sylvie 3, 2022, at 9:30 AM                Follow-up Information       Renato Stern Jr., MD. Go to.    Specialties: Cardiology, Interventional Cardiology  Why: EKG in office on Sylvie 3 at 930AM  See NERY Stanton, on June 8 and 930AM  Contact information:  [address] [place]  [address]  [phone]               Gina Hung, DO Follow up in 1 week(s).    Specialty: Family Medicine  Contact information:  [address]    [place] [id number] [phone]                           Discharge Order (From admission, onward)       Start     Ordered    06/01/22 0910  Discharge patient  Once        Expected Discharge Date: 06/01/22    Expected Discharge Time: Midday    Discharge Disposition: Home or Self Care    Physician of Record for Attribution - Please select from Treatment Team: SHIVAM TAO [[id number]]    Review needed by CMO to determine Physician of Record: No       Question Answer Comment   Physician of Record for Attribution - Please select from Treatment Team SHIVAM TAO    Review needed by CMO to determine Physician of Record No        06/01/22 0911                    Total time spent discharging patient including evaluation,post hospitalization follow up,  medication and post hospitalization instructions and education total time exceeds 30 minutes.    Signed:  Constantin Cheng MD  6/1/2022  11:53 EDT

## 2022-06-01 NOTE — PLAN OF CARE
Goal Outcome Evaluation:  Plan of Care Reviewed With: patient           Outcome Evaluation: patient denies pain nor discomfort, vital signs stable, pt on RA, SR , PAC, on the monitor, patient continue on Amiodorone, drip for Afib, pt NPO for posible cardioversion ,or see by the cardiology, patient educated to call for assist, call light inreach cont to monitor

## 2022-06-01 NOTE — OUTREACH NOTE
Prep Survey    Flowsheet Row Responses   [facility] patient discharged from? [place]   Is LACE score < 7 ? No   Emergency Room discharge w/ pulse ox? No   Eligibility [facility]   Date of Admission 05/28/22   Date of Discharge 06/01/22   Discharge Disposition Home or Self Care   Discharge diagnosis Afib, CHF   Does the patient have one of the following disease processes/diagnoses(primary or secondary)? CHF   Does the patient have Home health ordered? No   Is there a DME ordered? No   Prep survey completed? Yes          GWENDOLYN Vidal Registered Nurse

## 2022-06-01 NOTE — PROGRESS NOTES
"   LOS: 3 days   Patient Care Team:  Gina Hung DO as PCP - General (Family Medicine)    Chief Complaint: AF     Interval History: Remains in SR and ectopic atrial rhythm with PACs, although they are decreasing in frequency. He feels great and is ready to go home.       Objective   Vital Signs  Temp:  [98 °F (36.7 °C)-98.1 °F (36.7 °C)] 98 °F (36.7 °C)  Heart Rate:  [] 69  Resp:  [12-17] 16  BP: ()/() 134/91    Intake/Output Summary (Last 24 hours) at 6/1/2022 0911  Last data filed at 5/31/2022 2215  Gross per 24 hour   Intake 340 ml   Output 1450 ml   Net -1110 ml       Last Weight and Admission Weight        06/01/22  0523   Weight: 82.7 kg (182 lb 4.8 oz)     Flowsheet Rows    Flowsheet Row First Filed Value   Admission Height 182.9 cm (72\") Documented at 05/28/2022 1949   Admission Weight 82.1 kg (181 lb 1.6 oz) Documented at 05/29/2022 0524                  Physical Exam  Vitals reviewed.   Constitutional:       Appearance: He is well-developed.   HENT:      Head: Normocephalic.      Nose: Nose normal.      Mouth/Throat:      Pharynx: Oropharynx is clear.   Eyes:      Conjunctiva/sclera: Conjunctivae normal.   Neck:      Vascular: No JVD.   Cardiovascular:      Rate and Rhythm: Normal rate and regular rhythm.      Pulses: Normal pulses.      Heart sounds: Normal heart sounds.   Pulmonary:      Effort: Pulmonary effort is normal.      Breath sounds: Normal breath sounds.   Abdominal:      Palpations: Abdomen is soft.      Tenderness: There is no abdominal tenderness.   Musculoskeletal:         General: No swelling. Normal range of motion.      Cervical back: Normal range of motion.   Skin:     General: Skin is warm and dry.      Findings: No erythema.   Neurological:      General: No focal deficit present.      Mental Status: He is alert and oriented to person, place, and time.      Cranial Nerves: No cranial nerve deficit.   Psychiatric:         Mood and Affect: Mood normal.         " Behavior: Behavior normal.         Thought Content: Thought content normal.         Results Review:      Results from last 7 days   Lab Units 06/01/22  0305 05/31/22  0319 05/30/22  0310   SODIUM mmol/L 139 143 141   POTASSIUM mmol/L 3.2* 3.9 3.7   CHLORIDE mmol/L 101 103 101   CO2 mmol/L 26.0 30.0* 28.0   BUN mg/dL 21 23 23   CREATININE mg/dL 1.00 1.35* 1.32*   GLUCOSE mg/dL 95 83 100*   CALCIUM mg/dL 8.6 9.0 8.8     Results from last 7 days   Lab Units 05/29/22  0251 05/28/22  1520   TROPONIN T ng/mL <0.010 <0.010     Results from last 7 days   Lab Units 06/01/22  0305 05/31/22 0319 05/30/22  0310   WBC 10*3/mm3 8.69 10.12 10.16   HEMOGLOBIN g/dL 15.1 15.8 15.0   HEMATOCRIT % 46.2 47.2 43.6   PLATELETS 10*3/mm3 181 188 169     Results from last 7 days   Lab Units 05/31/22  1241   INR  1.40*                   I reviewed the patient's new clinical results.  I personally viewed and interpreted the patient's EKG/Telemetry data        Medication Review:   amiodarone, 200 mg, Oral, Q12H  apixaban, 5 mg, Oral, Q12H  atorvastatin, 10 mg, Oral, Daily  cefTRIAXone, 1 g, Intravenous, Q24H  furosemide, 40 mg, Oral, Daily  insulin lispro, 0-9 Units, Subcutaneous, TID With Meals  metoprolol succinate XL, 25 mg, Oral, Nightly  metoprolol succinate XL, 50 mg, Oral, Q24H  multivitamin with minerals, 1 tablet, Oral, Daily  pantoprazole, 40 mg, Oral, QAM        amiodarone, 0.5 mg/min, Last Rate: 0.5 mg/min (06/01/22 0517)        Assessment & Plan     1. Persistent atrial fibrillation  2. Acute sCHF  3. 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 HTN  5. IV antibiotics, ? PNA -- defer to primary team     Yesterday, I cardioverted him to an ectopic atrial rhythm with frequent PVCs and PACs. Then he started going back into PAF, although not sustained. Now, after amiodarone, he's pretty consistently in SR with some PACs.     I will send him home on amiodarone 200mg BID and he will come in for an EKG in the office in 2 days. His QT today is well less  than half of the R-R. He will be seen in the office in one week, and we can hopefully decrease him to 200mg daily.    He will remain on metoprolol succinate. I have added back a little bit of his home lisinopril, but only 5mg daily. His home HCTZ has been stopped and he'll go home on furosemide and potassium. We can consider spironolactone as an outpatient. I suspect his EF will improve with rate/rhythm control.    His BP is well controlled.    I went ahead and sent his meds to the pharmacy; he can go home from our standpoint. D/W Dr Cheng.     Mauro Ngo MD  06/01/22  09:11 EDT

## 2022-06-02 ENCOUNTER — TRANSITIONAL CARE MANAGEMENT TELEPHONE ENCOUNTER (OUTPATIENT)
Dept: CALL CENTER | Facility: HOSPITAL | Age: 67
End: 2022-06-02

## 2022-06-02 NOTE — OUTREACH NOTE
Call Center TCM Note    Flowsheet Row Responses   [facility] patient discharged from? [place]   Does the patient have one of the following disease processes/diagnoses(primary or secondary)? CHF   TCM attempt successful? Yes   Discharge diagnosis Afib, CHF   Meds reviewed with patient/caregiver? Yes   Is the patient having any side effects they believe may be caused by any medication additions or changes? No   Does the patient have all medications ordered at discharge? Yes   Is the patient taking all medications as directed (includes completed medication regime)? Yes   Does the patient have a primary care provider?  Yes   Does the patient have an appointment with their PCP within 7 days of discharge? No   Comments regarding PCP Pt will call to [facility] TCM APPT with Dr Hung after cardiac testing and CARDIO MD appt on 06/08/2022.   Has the patient kept scheduled appointments due by today? N/A   Has home health visited the patient within 72 hours of discharge? N/A   Psychosocial issues? No   Did the patient receive a copy of their discharge instructions? Yes   Nursing interventions Reviewed instructions with patient   What is the patient's perception of their health status since discharge? Improving   Nursing interventions Nurse provided patient education   Is the patient weighing daily? Yes   Does the patient have scales? Yes   Is the patient able to teach back Heart Failure diet management? Yes   Is the patient able to teach back signs and symptoms of worsening condition? (i.e. weight gain, shortness of air, etc.) Yes   If the patient is a current smoker, are they able to teach back resources for cessation? Not a smoker   TCM call completed? Yes   Wrap up additional comments Pt feeling well s/p emergent cardioversion. All new medications in place. No questions at this time. Pt has some cardiac testing tomorrow 06/03, then follow up with CARDIO MD on 06/08/2022. Pt wants PCP Dr Hung updates and will call her  after these appts completed.            Leatha Zabala MA    6/2/2022, 16:39 EDT

## 2022-06-02 NOTE — OUTREACH NOTE
Call Center TCM Note    Flowsheet Row Responses   [facility] patient discharged from? [place]   Does the patient have one of the following disease processes/diagnoses(primary or secondary)? CHF   TCM attempt successful? No   Unsuccessful attempts Attempt 1           Leatha Zabala MA    6/2/2022, 14:52 EDT

## 2022-06-03 ENCOUNTER — CLINICAL SUPPORT (OUTPATIENT)
Dept: CARDIOLOGY | Facility: CLINIC | Age: 67
End: 2022-06-03

## 2022-06-03 ENCOUNTER — TELEPHONE (OUTPATIENT)
Dept: CARDIOLOGY | Facility: CLINIC | Age: 67
End: 2022-06-03

## 2022-06-03 VITALS — DIASTOLIC BLOOD PRESSURE: 88 MMHG | HEART RATE: 70 BPM | SYSTOLIC BLOOD PRESSURE: 154 MMHG

## 2022-06-03 DIAGNOSIS — I48.0 PAF (PAROXYSMAL ATRIAL FIBRILLATION): Primary | ICD-10-CM

## 2022-06-03 PROCEDURE — 93000 ELECTROCARDIOGRAM COMPLETE: CPT | Performed by: NURSE PRACTITIONER

## 2022-06-03 NOTE — PROGRESS NOTES
Procedure     ECG 12 Lead    Date/Time: 6/3/2022 9:57 AM  Performed by: Zuly Meek APRN  Authorized by: Zuly Meek APRN   Comparison: compared with previous ECG   Similar to previous ECG  Rhythm: sinus rhythm  Rate: normal  Comments: Normal QTC, no a fib

## 2022-06-03 NOTE — TELEPHONE ENCOUNTER
Notified pt. He verbalized understanding.    Thank you,    Randa Jules, RN  Triage [facility]

## 2022-06-03 NOTE — TELEPHONE ENCOUNTER
Dudley Caruso called this morning. He said he was discharged from the hospital on Wednesday. Yesterday he weighed 181.6 lb. Today he weighs 184.8 lb. He does not have any SOA or swelling. He also said that he isn't sure how accurate the scale is, but still wanted to let us know. He has not missed any of his furosemide doses, but he did miss a metoprolol dose last night on accident.    He comes in for an EKG at 9:30. Do you have any recommendations for him?    Thank you,    Randa Jules, RN  Triage [facility]

## 2022-06-03 NOTE — TELEPHONE ENCOUNTER
Continue daily weights, it may go down again tomorrow. Monitor sodium and limit/avoid. If he continues to gain and develops SOB or swelling then we will need to make adjustment.

## 2022-06-03 NOTE — PROGRESS NOTES
Pt present to the office for EKG.  Verified medications.      bp today was 154/88 hr 70.  Pt has an upcoming appt on 06/08.      Reviewed ekg with Zuly Meek.  Pt verbalized understanding about monitoring his wt and to limit and avoid sodium.  Pt released from the office.

## 2022-06-08 ENCOUNTER — OFFICE VISIT (OUTPATIENT)
Dept: CARDIOLOGY | Facility: CLINIC | Age: 67
End: 2022-06-08

## 2022-06-08 VITALS
WEIGHT: 188 LBS | DIASTOLIC BLOOD PRESSURE: 60 MMHG | BODY MASS INDEX: 25.47 KG/M2 | HEIGHT: 72 IN | SYSTOLIC BLOOD PRESSURE: 120 MMHG | HEART RATE: 62 BPM

## 2022-06-08 DIAGNOSIS — I42.0 CARDIOMYOPATHY, DILATED: ICD-10-CM

## 2022-06-08 DIAGNOSIS — E78.2 MIXED HYPERLIPIDEMIA: ICD-10-CM

## 2022-06-08 DIAGNOSIS — R94.31 ABNORMAL EKG: ICD-10-CM

## 2022-06-08 DIAGNOSIS — I48.0 PAF (PAROXYSMAL ATRIAL FIBRILLATION): Primary | ICD-10-CM

## 2022-06-08 PROCEDURE — 99214 OFFICE O/P EST MOD 30 MIN: CPT | Performed by: NURSE PRACTITIONER

## 2022-06-08 PROCEDURE — 93000 ELECTROCARDIOGRAM COMPLETE: CPT | Performed by: NURSE PRACTITIONER

## 2022-06-08 NOTE — PROGRESS NOTES
Date of Office Visit: 22  Encounter Provider: NERY Stanton  Place of Service: [facility] CARDIOLOGY  Patient Name: Tesfaye Smith  :1955    Chief Complaint   Patient presents with   • Atrial fibrillation, persistent    • Hyperlipidemia   • Hypertension   • Follow-up   :     HPI: Tesfaye Smith is a 66 y.o. male  with atrial fibrillation, LV dysfunction, cardiomyopathy, hypertension, hyperlipidemia,arthritis,  azar's esophagus and GERD. .  He is followed by Dr. Renato Stern. I will visit with him for the first time and have reviewed his medical record.   He was found to have new onset atrial fibrillation in May 2022. Echocardiogram showed moderately decreased left ventricular systolic function with an EF of approximately 34%, borderline dilated left ventricular cavity, mild to moderately dilated right ventricular cavity, mildly reduced right ventricular systolic function and mild to moderate mitral regurgitation.  He was started on metoprolol and Eliquis.  He continued to have symptomatic atrial fibrillation and had a ERIC/cardioversion on 2022.  There was no evidence of intracardiac thrombus or mass so he was successfully cardioverted to an ectopic atrial rhythm with PACs and PVCs.     He presents today for reassessment.  He denied any palpitations or anxiety flutter sensation.  He denies shortness of breath.  He no longer has swelling.  He denies chest pain tightness or pressure.  He reports increased energy over the last couple days.  He is following his blood pressure and heart rate which have both been stable at home.  His weights are also stable.  He is tolerating current medication regimen.  Plan to decrease amiodarone today.  He denies blood in the urine or stool.    No Known Allergies        Family and social history reviewed.     ROS  All other systems were reviewed and are negative          Objective:     Vitals:    22 0937   BP: 120/60   BP  "Location: Right arm   Patient Position: Sitting   Pulse: 62   Weight: 85.3 kg (188 lb)   Height: 182.9 cm (72\")     Body mass index is 25.5 kg/m².    PHYSICAL EXAM:  Cardiovascular:      Normal rate. Regular rhythm.           ECG 12 Lead    Date/Time: 6/8/2022 9:50 AM  Performed by: Zuly Meek APRN  Authorized by: Zuly Meek APRN   Comparison: compared with previous ECG   Similar to previous ECG  Rhythm: sinus rhythm  Rate: normal              Current Outpatient Medications   Medication Sig Dispense Refill   • amiodarone (PACERONE) 200 MG tablet Take 1 tablet by mouth 2 (Two) Times a Day for 7 days, THEN 1 tablet Daily. 45 tablet 1   • apixaban (ELIQUIS) 5 MG tablet tablet Take 5 mg by mouth Every 12 (Twelve) Hours.     • atorvastatin (LIPITOR) 10 MG tablet Take 10 mg by mouth Daily.     • esomeprazole (nexIUM) 40 MG capsule Take 40 mg by mouth Every Morning Before Breakfast.     • furosemide (LASIX) 40 MG tablet Take 1 tablet by mouth Daily. 30 tablet 1   • lisinopril (PRINIVIL,ZESTRIL) 5 MG tablet Take 1 tablet by mouth Daily. 30 tablet 1   • metoprolol succinate XL (TOPROL-XL) 25 MG 24 hr tablet Take 2 tablets by mouth Every Morning AND 1 tablet Every Night. 90 tablet 1   • Multiple Vitamins-Minerals (CENTRUM SILVER ADULT 50+) tablet Take 1 tablet by mouth Daily.     • potassium chloride 10 MEQ CR tablet Take 1 tablet by mouth Daily. 30 tablet 1     No current facility-administered medications for this visit.     Assessment:       Diagnosis Plan   1. PAF (paroxysmal atrial fibrillation) ([facility])  ECG 12 Lead    Stress Test With Myocardial Perfusion One Day   2. Mixed hyperlipidemia     3. Cardiomyopathy, dilated ([facility])  Stress Test With Myocardial Perfusion One Day   4. Abnormal EKG  Stress Test With Myocardial Perfusion One Day        Orders Placed This Encounter   Procedures   • Stress Test With Myocardial Perfusion One Day     Standing Status:   Future     Standing Expiration Date:   6/8/2023     Order " Specific Question:   What stress agent will be used?     Answer:   Regadenoson (Lexiscan)     Order Specific Question:   Difficulty walking criteria?     Answer:   AFib/VTach     Order Specific Question:   Reason for exam?     Answer:   Arrhythmia     Order Specific Question:   Reason for exam?     Answer:   Heart Failure     Order Specific Question:   Arrhythmia(s)?     Answer:   AFib     Order Specific Question:   Release to patient     Answer:   Immediate   • ECG 12 Lead     This order was created via procedure documentation     Order Specific Question:   Release to patient     Answer:   Immediate         Plan:       1. 66 year old gentleman with persistent a fib s/p cardioversion 5/31/2022. Remains in NSR today. QTC stable. Plan to decrease amio today to once daily.  He will follow-up in 6weeks is negative we will arrange echocardiogram same day  2. Cardiomyopathy, EF 37%. New may 2022 could be tachycardia/ arrhythmia mediated. Needs ischemic eval so I will arrange for perfusion stress test.  At this time he will continue metoprolol succinate, lisinopril and Lasix with potassium replacement  3. Hypertension blood pressure appears well controlled  4. Hyperlipidemia  5. Arthritis  6. GERD/ Almaraz's esophagus   7. Spider veins BLE- no edema              It has been a pleasure to participate in this patient's care.      Thank you,  NERY Stanton      **I used Dragon to dictate this note:**

## 2022-06-09 ENCOUNTER — READMISSION MANAGEMENT (OUTPATIENT)
Dept: CALL CENTER | Facility: HOSPITAL | Age: 67
End: 2022-06-09

## 2022-06-09 NOTE — OUTREACH NOTE
CHF Week 2 Survey    Flowsheet Row Responses   [facility] patient discharged from? [place]   Does the patient have one of the following disease processes/diagnoses(primary or secondary)? CHF   Week 2 attempt successful? No   Unsuccessful attempts Attempt 1          ABRAM Vidal Registered Nurse

## 2022-06-14 ENCOUNTER — READMISSION MANAGEMENT (OUTPATIENT)
Dept: CALL CENTER | Facility: HOSPITAL | Age: 67
End: 2022-06-14

## 2022-06-14 NOTE — OUTREACH NOTE
CHF Week 2 Survey    Flowsheet Row Responses   [facility] patient discharged from? [place]   Does the patient have one of the following disease processes/diagnoses(primary or secondary)? CHF   Week 2 attempt successful? No   Unsuccessful attempts Attempt 2          ROB CLEVELAND - Registered Nurse

## 2022-06-21 ENCOUNTER — TELEPHONE (OUTPATIENT)
Dept: CARDIOLOGY | Facility: CLINIC | Age: 67
End: 2022-06-21

## 2022-06-21 ENCOUNTER — READMISSION MANAGEMENT (OUTPATIENT)
Dept: CALL CENTER | Facility: HOSPITAL | Age: 67
End: 2022-06-21

## 2022-06-21 NOTE — TELEPHONE ENCOUNTER
Patient called because since Friday his HR has been running around 53-65. BP has been in the 120-130s/80s. He is asymptomatic, but is wondering if he needs to adjust his medications. He currently takes 200mg of amiodarone in the AM, 50mg of metoprolol in the AM, and 25mg of metoprolol at night.     Let me know how you would like to proceed.    [place] he requests that we call him on his wife's phone on file and leave a message if no one answers.    Jackie Arana RN  Triage [facility]

## 2022-06-21 NOTE — TELEPHONE ENCOUNTER
Tesfaye Smith has been scheduled for appointment at 11am on 6/27.      Thank you,  Christiana Almeida RN  Triage Nurse G

## 2022-06-21 NOTE — TELEPHONE ENCOUNTER
Dudley Caruso has been scheduled to see you on Monday after his stress test.    Thank you,    Randa Jules RN  Triage [facility]

## 2022-06-21 NOTE — TELEPHONE ENCOUNTER
I will see him on Monday, 6/27 after his stress test. I am rounding that day but will need Jayne or the MA rooming my patients to let me know when patient is ready. He will need EKG during the visit

## 2022-06-21 NOTE — TELEPHONE ENCOUNTER
Continue amio the same. Decrease metoprolol to 25 twice daily. Please update med list and schedule him a follow up with me in 2 weeks. He has test scheduled next week here, if I have a slot available I may be able to see him after his test.

## 2022-06-21 NOTE — TELEPHONE ENCOUNTER
Called and left VM. Will continue to try to reach patient.       Jackie Arana RN  Triage [facility]

## 2022-06-21 NOTE — TELEPHONE ENCOUNTER
"The only opening you have in the next 2 weeks is a \"same day\" appointment on 7/5 at 3pm. Is that ok to use for you if patient is able to make it? If not you have availability the week of the 11th just not sure if you want to wait that long.    Jackie Arana RN  Triage [facility]    "

## 2022-06-21 NOTE — OUTREACH NOTE
CHF Week 3 Survey    Flowsheet Row Responses   [facility] patient discharged from? [place]   Does the patient have one of the following disease processes/diagnoses(primary or secondary)? CHF   Week 3 attempt successful? No   Unsuccessful attempts Attempt 1          ROB CLEVELAND - Registered Nurse

## 2022-06-21 NOTE — TELEPHONE ENCOUNTER
Reviewed recommendations with Tesfaye Smith and the patient verbalized understanding of the recommendations.    Thank you,  Christiana Almeida RN  Triage Nurse G

## 2022-06-23 ENCOUNTER — READMISSION MANAGEMENT (OUTPATIENT)
Dept: CALL CENTER | Facility: HOSPITAL | Age: 67
End: 2022-06-23

## 2022-06-27 ENCOUNTER — OFFICE VISIT (OUTPATIENT)
Dept: CARDIOLOGY | Facility: CLINIC | Age: 67
End: 2022-06-27

## 2022-06-27 ENCOUNTER — TELEPHONE (OUTPATIENT)
Dept: CARDIOLOGY | Facility: CLINIC | Age: 67
End: 2022-06-27

## 2022-06-27 ENCOUNTER — HOSPITAL ENCOUNTER (OUTPATIENT)
Dept: CARDIOLOGY | Facility: HOSPITAL | Age: 67
Discharge: HOME | End: 2022-06-27
Admitting: NURSE PRACTITIONER

## 2022-06-27 VITALS
HEART RATE: 52 BPM | DIASTOLIC BLOOD PRESSURE: 80 MMHG | WEIGHT: 186 LBS | HEIGHT: 72 IN | SYSTOLIC BLOOD PRESSURE: 130 MMHG | BODY MASS INDEX: 25.19 KG/M2

## 2022-06-27 VITALS — HEIGHT: 72 IN | BODY MASS INDEX: 25.47 KG/M2 | WEIGHT: 188.05 LBS

## 2022-06-27 DIAGNOSIS — I42.0 CARDIOMYOPATHY, DILATED: ICD-10-CM

## 2022-06-27 DIAGNOSIS — E78.2 MIXED HYPERLIPIDEMIA: ICD-10-CM

## 2022-06-27 DIAGNOSIS — R94.31 ABNORMAL EKG: ICD-10-CM

## 2022-06-27 DIAGNOSIS — I48.0 PAF (PAROXYSMAL ATRIAL FIBRILLATION): ICD-10-CM

## 2022-06-27 DIAGNOSIS — I49.3 PVC (PREMATURE VENTRICULAR CONTRACTION): Primary | ICD-10-CM

## 2022-06-27 DIAGNOSIS — I47.29 NONSUSTAINED VENTRICULAR TACHYCARDIA: ICD-10-CM

## 2022-06-27 DIAGNOSIS — I48.0 PAF (PAROXYSMAL ATRIAL FIBRILLATION): Primary | ICD-10-CM

## 2022-06-27 LAB
BH CV NUCLEAR PRIOR STUDY: 2
BH CV REST NUCLEAR ISOTOPE DOSE: 10.2 MCI
BH CV STRESS BP STAGE 1: NORMAL
BH CV STRESS COMMENTS STAGE 1: NORMAL
BH CV STRESS DOSE REGADENOSON STAGE 1: 0.4
BH CV STRESS DURATION MIN STAGE 1: 0
BH CV STRESS DURATION SEC STAGE 1: 10
BH CV STRESS HR STAGE 1: 101
BH CV STRESS NUCLEAR ISOTOPE DOSE: 34.2 MCI
BH CV STRESS PROTOCOL 1: NORMAL
BH CV STRESS RECOVERY BP: NORMAL MMHG
BH CV STRESS RECOVERY HR: 75 BPM
BH CV STRESS STAGE 1: 1
LV EF NUC BP: 43 %
MAXIMAL PREDICTED HEART RATE: 152 BPM
PERCENT MAX PREDICTED HR: 66.45 %
STRESS BASELINE BP: NORMAL MMHG
STRESS BASELINE HR: 59 BPM
STRESS PERCENT HR: 78 %
STRESS POST EXERCISE DUR SEC: 10 SEC
STRESS POST PEAK BP: NORMAL MMHG
STRESS POST PEAK HR: 101 BPM
STRESS TARGET HR: 129 BPM

## 2022-06-27 PROCEDURE — 78452 HT MUSCLE IMAGE SPECT MULT: CPT | Performed by: INTERNAL MEDICINE

## 2022-06-27 PROCEDURE — 0 TECHNETIUM TETROFOSMIN KIT: Performed by: NURSE PRACTITIONER

## 2022-06-27 PROCEDURE — 78452 HT MUSCLE IMAGE SPECT MULT: CPT

## 2022-06-27 PROCEDURE — 99214 OFFICE O/P EST MOD 30 MIN: CPT | Performed by: NURSE PRACTITIONER

## 2022-06-27 PROCEDURE — 93018 CV STRESS TEST I&R ONLY: CPT | Performed by: INTERNAL MEDICINE

## 2022-06-27 PROCEDURE — A9502 TC99M TETROFOSMIN: HCPCS | Performed by: NURSE PRACTITIONER

## 2022-06-27 PROCEDURE — 93016 CV STRESS TEST SUPVJ ONLY: CPT | Performed by: INTERNAL MEDICINE

## 2022-06-27 PROCEDURE — 93017 CV STRESS TEST TRACING ONLY: CPT

## 2022-06-27 PROCEDURE — 93000 ELECTROCARDIOGRAM COMPLETE: CPT | Performed by: NURSE PRACTITIONER

## 2022-06-27 PROCEDURE — 25010000002 REGADENOSON 0.4 MG/5ML SOLUTION: Performed by: NURSE PRACTITIONER

## 2022-06-27 RX ORDER — POTASSIUM CHLORIDE 750 MG/1
10 TABLET, FILM COATED, EXTENDED RELEASE ORAL DAILY
Qty: 30 TABLET | Refills: 3 | Status: SHIPPED | OUTPATIENT
Start: 2022-06-27 | End: 2022-10-13

## 2022-06-27 RX ORDER — LISINOPRIL 5 MG/1
5 TABLET ORAL DAILY
Qty: 90 TABLET | Refills: 1 | Status: SHIPPED | OUTPATIENT
Start: 2022-06-27 | End: 2022-10-13

## 2022-06-27 RX ORDER — FUROSEMIDE 40 MG/1
40 TABLET ORAL DAILY
Qty: 30 TABLET | Refills: 3 | Status: SHIPPED | OUTPATIENT
Start: 2022-06-27 | End: 2022-10-13

## 2022-06-27 RX ORDER — METOPROLOL SUCCINATE 25 MG/1
25 TABLET, EXTENDED RELEASE ORAL
Qty: 90 TABLET | Refills: 1 | Status: SHIPPED | OUTPATIENT
Start: 2022-06-27 | End: 2022-09-10 | Stop reason: SDUPTHER

## 2022-06-27 RX ORDER — AMIODARONE HYDROCHLORIDE 200 MG/1
100 TABLET ORAL DAILY
Qty: 30 TABLET | Refills: 2 | Status: SHIPPED | OUTPATIENT
Start: 2022-06-27 | End: 2022-12-21 | Stop reason: SDUPTHER

## 2022-06-27 RX ADMIN — TETROFOSMIN 1 DOSE: 1.38 INJECTION, POWDER, LYOPHILIZED, FOR SOLUTION INTRAVENOUS at 10:03

## 2022-06-27 RX ADMIN — TETROFOSMIN 1 DOSE: 1.38 INJECTION, POWDER, LYOPHILIZED, FOR SOLUTION INTRAVENOUS at 09:20

## 2022-06-27 RX ADMIN — REGADENOSON 0.4 MG: 0.08 INJECTION, SOLUTION INTRAVENOUS at 10:03

## 2022-06-27 NOTE — TELEPHONE ENCOUNTER
Please inform patient stress images show that overall blood flow is good. He does not need a heart cath at this time however, We saw frequent vetricular ectopy so I will arrange for a 48 hour holter to count them. If possibly more than 10%, we will need further test.

## 2022-06-27 NOTE — PROGRESS NOTES
Date of Office Visit: 22  Encounter Provider: NERY Stanton  Place of Service: [facility]  Patient Name: Tesfaye Smith  :1955    Chief Complaint   Patient presents with   • PAF (paroxysmal atrial fibrillation)   • Hyperlipidemia   • Follow-up   :     HPI: Tesfaye Smith is a 66 y.o. male  with atrial fibrillation, LV dysfunction, cardiomyopathy, hypertension, hyperlipidemia,arthritis,  azar's esophagus and GERD. .  He is followed by Dr. Renato Stern. I will visit with him in follow up today and have reviewed his medical record.   He was found to have new onset atrial fibrillation in May 2022. Echocardiogram showed moderately decreased left ventricular systolic function with an EF of approximately 34%, borderline dilated left ventricular cavity, mild to moderately dilated right ventricular cavity, mildly reduced right ventricular systolic function and mild to moderate mitral regurgitation.  He was started on metoprolol and Eliquis.  He continued to have symptomatic atrial fibrillation and had a ERIC/cardioversion on 2022.  There was no evidence of intracardiac thrombus or mass so he was successfully cardioverted to an ectopic atrial rhythm with PACs and PVCs. amio was decreased. His HR was running 53-65 so I decreased metoprolol to 25 mg twice daily last week.     He presents for reassessment. He had stress test prior to visit.  He reports improved energy on lower dose metoprolol.  He has no chest pain.  He is tolerating Eliquis without signs of bleeding.  He is taking his medication as prescribed.  He has been tracking his blood pressure and brought a list which show values anywhere from 105-133/70-80.  His pulse is 50-60s routinely.  He has no near-syncope or syncope.          No Known Allergies        Family and social history reviewed.     ROS  All other systems were reviewed and are negative          Objective:     Vitals:    22 1131   BP:  "130/80   BP Location: Left arm   Patient Position: Sitting   Pulse: 52   Weight: 84.4 kg (186 lb)   Height: 182.9 cm (72\")     Body mass index is 25.23 kg/m².    PHYSICAL EXAM:  Pulmonary:      Effort: Pulmonary effort is normal.   Cardiovascular:      Normal rate. Regular rhythm.           ECG 12 Lead    Date/Time: 6/27/2022 12:23 PM  Performed by: Zuly Meek APRN  Authorized by: Zuly Meek APRN   Comparison: compared with previous ECG   Similar to previous ECG  Rhythm: sinus rhythm  Rate: normal    Clinical impression: normal ECG  Comments: QTC stable        -      Current Outpatient Medications   Medication Sig Dispense Refill   • amiodarone (PACERONE) 200 MG tablet Take 0.5 tablets by mouth Daily. 30 tablet 2   • apixaban (ELIQUIS) 5 MG tablet tablet Take 5 mg by mouth Every 12 (Twelve) Hours.     • atorvastatin (LIPITOR) 10 MG tablet Take 10 mg by mouth Daily.     • esomeprazole (nexIUM) 40 MG capsule Take 40 mg by mouth Every Morning Before Breakfast.     • furosemide (LASIX) 40 MG tablet Take 1 tablet by mouth Daily. 30 tablet 3   • lisinopril (PRINIVIL,ZESTRIL) 5 MG tablet Take 1 tablet by mouth Daily. 90 tablet 1   • metoprolol succinate XL (TOPROL-XL) 25 MG 24 hr tablet Take 1 tablet by mouth Every Morning. 90 tablet 1   • Multiple Vitamins-Minerals (CENTRUM SILVER ADULT 50+) tablet Take 1 tablet by mouth Daily.     • potassium chloride 10 MEQ CR tablet Take 1 tablet by mouth Daily. 30 tablet 3     No current facility-administered medications for this visit.     Assessment:       Diagnosis Plan   1. PAF (paroxysmal atrial fibrillation) (HCC)     2. Mixed hyperlipidemia          Orders Placed This Encounter   Procedures   • ECG 12 Lead     This order was created via procedure documentation     Order Specific Question:   Release to patient     Answer:   Immediate         Plan:     1. 66 year old gentleman with persistent a fib s/p cardioversion 5/31/2022. Remains in NSR today. QTC stable.  Plan to " decrease amiodarone to 100 mg daily.  I will also decrease metoprolol succinate to 25 mg daily since his heart rate is routinely 50-60s.  2. Cardiomyopathy, EF 37%. New may 2022 could be tachycardia/ arrhythmia mediated.  Perfusion stress test is pending final review at this time.  He will continue metoprolol succinate, lisinopril and Lasix with potassium replacement  3. Hypertension blood pressure appears well controlled overall he will continue to follow on current regimen  4. Hyperlipidemia  5. Arthritis  6. GERD/ Almaraz's esophagus   7. Spider veins BLE- no edema      If stress test shows normal perfusion he will have repeat limited echo in September to reassess left ventricular systolic function          It has been a pleasure to participate in this patient's care.      Thank you,  NERY Stanton      **I used Dragon to dictate this note:**

## 2022-06-27 NOTE — TELEPHONE ENCOUNTER
Reviewed results and recommendations with Tesfaye Smith.  Patient's questions were answered and he verbalized understanding of results and recommendations.    Scheduling: please contact patient to arrange for Holter monitor placement.    Thank you,  Christiana Almeida RN  Triage Nurse [facility]

## 2022-07-20 ENCOUNTER — TELEPHONE (OUTPATIENT)
Dept: CARDIOLOGY | Facility: CLINIC | Age: 67
End: 2022-07-20

## 2022-07-20 NOTE — TELEPHONE ENCOUNTER
I would not make permanent changes based on 1 day of elevated blood pressure reading or one day of lower heart rate. I agree with continued monitoring and your instruction.

## 2022-07-20 NOTE — TELEPHONE ENCOUNTER
"Tesfaye Smith called to report an elevated blood pressure reading and low heart rate reading.  Patient also reports that he feels \"jittery\" and stated he has no other symptoms.  Patient stated it does not feel like he is in atrial fibrillation, but stated he can feel his heart beating in his chest.  Patient provided the following readings:    Date Weight BP HR   15-Jul 184 112/80 62   16-Jul 182.8 127/76 64   17-Jul 183.8 118/73 62   18-Jul 183.8 124/81 61   19-Jul 184.4 131/78 58   20-Jul 182.6 163/84 42     Patient reports he took his morning medications at 7 am.  Blood pressure was checked at 9 am.  Patient rechecked: /90, HR 40.  However, patient stated his blood pressure cuff was reading low battery.  Requested patient change batteries and recheck: /88, HR 64.  BP machine last checked during office visit on 5/17 and was \"pretty close\" to office reading.     Cardiac Medications  Lisinopril 5 mg, daily  Furosemide 40 mg, daily  Amiodarone 200 mg, 0.5 tablet daily  Metoprolol succinate XL 25 mg, every morning  Apixaban 5 mg, BID    Encouraged patient to rest and take it easy today.  Educated Tesfaye on the effects of stress on blood pressure and he verbalized understanding.  Requested patient continue monitoring blood pressure and heart rate and to notify office if blood pressure remains elevated or for low heart rate readings.      However, patient expressed concern about blood pressure reading and the low heart rate reading and is asking if for your recommendations.      Please let me know how you would like to proceed.    Thank you,  Christiana Almeida, RN  Triage Nurse [facility]        "

## 2022-07-20 NOTE — TELEPHONE ENCOUNTER
Reviewed recommendations with Tesfaye Smith and the patient verbalized understanding of the recommendations.    Patient stated he is at store to  some new batteries and possibly a new blood pressure machine.    Thank you,  Christiana Almeida RN  Triage Nurse [facility]

## 2022-09-08 ENCOUNTER — HOSPITAL ENCOUNTER (OUTPATIENT)
Dept: CARDIOLOGY | Facility: HOSPITAL | Age: 67
Discharge: HOME | End: 2022-09-08
Admitting: NURSE PRACTITIONER

## 2022-09-08 ENCOUNTER — OFFICE VISIT (OUTPATIENT)
Dept: CARDIOLOGY | Facility: CLINIC | Age: 67
End: 2022-09-08

## 2022-09-08 VITALS
WEIGHT: 186 LBS | BODY MASS INDEX: 25.19 KG/M2 | HEART RATE: 64 BPM | OXYGEN SATURATION: 98 % | SYSTOLIC BLOOD PRESSURE: 172 MMHG | HEIGHT: 72 IN | DIASTOLIC BLOOD PRESSURE: 84 MMHG

## 2022-09-08 VITALS
BODY MASS INDEX: 25.55 KG/M2 | HEIGHT: 72 IN | HEART RATE: 48 BPM | WEIGHT: 188.6 LBS | SYSTOLIC BLOOD PRESSURE: 142 MMHG | DIASTOLIC BLOOD PRESSURE: 86 MMHG

## 2022-09-08 DIAGNOSIS — I48.0 PAF (PAROXYSMAL ATRIAL FIBRILLATION): ICD-10-CM

## 2022-09-08 DIAGNOSIS — I47.29 NONSUSTAINED VENTRICULAR TACHYCARDIA: ICD-10-CM

## 2022-09-08 DIAGNOSIS — I42.0 CARDIOMYOPATHY, DILATED: ICD-10-CM

## 2022-09-08 DIAGNOSIS — I48.0 PAF (PAROXYSMAL ATRIAL FIBRILLATION): Primary | ICD-10-CM

## 2022-09-08 DIAGNOSIS — I50.22 CHRONIC SYSTOLIC HEART FAILURE: ICD-10-CM

## 2022-09-08 LAB
BH CV ECHO MEAS - EDV(CUBED): 116.3 ML
BH CV ECHO MEAS - EDV(MOD-SP2): 159 ML
BH CV ECHO MEAS - EDV(MOD-SP4): 161 ML
BH CV ECHO MEAS - EF(MOD-BP): 56.8 %
BH CV ECHO MEAS - EF(MOD-SP2): 59.1 %
BH CV ECHO MEAS - EF(MOD-SP4): 58.4 %
BH CV ECHO MEAS - ESV(CUBED): 50.4 ML
BH CV ECHO MEAS - ESV(MOD-SP2): 65 ML
BH CV ECHO MEAS - ESV(MOD-SP4): 67 ML
BH CV ECHO MEAS - FS: 24.3 %
BH CV ECHO MEAS - IVS/LVPW: 0.97 CM
BH CV ECHO MEAS - IVSD: 1.01 CM
BH CV ECHO MEAS - LV DIASTOLIC VOL/BSA (35-75): 77.9 CM2
BH CV ECHO MEAS - LV MASS(C)D: 179.8 GRAMS
BH CV ECHO MEAS - LV SYSTOLIC VOL/BSA (12-30): 32.4 CM2
BH CV ECHO MEAS - LVIDD: 4.9 CM
BH CV ECHO MEAS - LVIDS: 3.7 CM
BH CV ECHO MEAS - LVPWD: 1.03 CM
BH CV ECHO MEAS - SI(MOD-SP2): 45.5 ML/M2
BH CV ECHO MEAS - SI(MOD-SP4): 45.5 ML/M2
BH CV ECHO MEAS - SV(MOD-SP2): 94 ML
BH CV ECHO MEAS - SV(MOD-SP4): 94 ML
BH CV VAS BP RIGHT ARM: NORMAL MMHG
MAXIMAL PREDICTED HEART RATE: 151 BPM
STRESS TARGET HR: 128 BPM

## 2022-09-08 PROCEDURE — 99214 OFFICE O/P EST MOD 30 MIN: CPT | Performed by: INTERNAL MEDICINE

## 2022-09-08 PROCEDURE — 25010000002 PERFLUTREN (DEFINITY) 8.476 MG IN SODIUM CHLORIDE (PF) 0.9 % 10 ML INJECTION: Performed by: NURSE PRACTITIONER

## 2022-09-08 PROCEDURE — 93308 TTE F-UP OR LMTD: CPT

## 2022-09-08 PROCEDURE — 93308 TTE F-UP OR LMTD: CPT | Performed by: INTERNAL MEDICINE

## 2022-09-08 RX ADMIN — PERFLUTREN 1.5 ML: 6.52 INJECTION, SUSPENSION INTRAVENOUS at 12:40

## 2022-09-08 NOTE — PROGRESS NOTES
[facility]      Patient Name: Tesfaye Smith  :1955  Age: 67 y.o.  Encounter Provider:  Renato Stern Jr, MD      Chief Complaint: Follow-up acute systolic heart failure and atrial fibrillation      HPI  Tesfaye Smith is a 67 y.o. male with past medical history of acute systolic heart failure and atrial fibrillation who presents for routine follow-up.  I originally saw him in the hospital on Memorial Day weekend where he presented with atrial fibrillation with RVR and LV dysfunction.  He was started on Eliquis and metoprolol and underwent cardioversion with successful restoration of sinus rhythm.  He was seen by Zuly London in hospital follow-up and heart rate was running on the low side so his metoprolol dosing was decreased.  Has been doing extremely well since then.  He has improved activity and is currently cycling 14 miles 2-3 times per week.  The problem he is having is with Eliquis due to financial feasibility.  No bleeding complications.  Heart rate remains on the low side today in clinic.  He denies orthopnea, PND or edema.  No angina.  He did have a stress study with no evidence of myocardial ischemia.      The following portions of the patient's history were reviewed and updated as appropriate: allergies, current medications, past family history, past medical history, past social history, past surgical history and problem list.      Review of Systems   Constitutional: Negative for chills and fever.   HENT: Negative for hoarse voice and sore throat.    Eyes: Negative for double vision and photophobia.   Cardiovascular: Negative for chest pain, leg swelling, near-syncope, orthopnea, palpitations, paroxysmal nocturnal dyspnea and syncope.   Respiratory: Negative for cough and wheezing.    Skin: Negative for poor wound healing and rash.   Musculoskeletal: Negative for arthritis and joint swelling.   Gastrointestinal: Negative for bloating, abdominal pain, hematemesis and  "hematochezia.   Neurological: Negative for dizziness and focal weakness.   Psychiatric/Behavioral: Negative for depression and suicidal ideas.       OBJECTIVE:   Vital Signs  Vitals:    09/08/22 1252   BP: 142/86   Pulse: (!) 48     Estimated body mass index is 25.58 kg/m² as calculated from the following:    Height as of this encounter: 182.9 cm (72\").    Weight as of this encounter: 85.5 kg (188 lb 9.6 oz).    Vitals reviewed.   Constitutional:       Appearance: Healthy appearance. Not in distress.   Neck:      Vascular: No JVR. JVD normal.   Pulmonary:      Effort: Pulmonary effort is normal.      Breath sounds: Normal breath sounds. No wheezing. No rhonchi. No rales.   Chest:      Chest wall: Not tender to palpatation.   Cardiovascular:      PMI at left midclavicular line. Normal rate. Regular rhythm. Normal S1. Normal S2.      Murmurs: There is no murmur.      No gallop. No click. No rub.   Pulses:     Intact distal pulses.   Edema:     Peripheral edema absent.   Abdominal:      General: Bowel sounds are normal.      Palpations: Abdomen is soft.      Tenderness: There is no abdominal tenderness.   Musculoskeletal: Normal range of motion.         General: No tenderness. Skin:     General: Skin is warm and dry.   Neurological:      General: No focal deficit present.      Mental Status: Alert and oriented to person, place and time.         Procedures          ASSESSMENT:     Paroxysmal atrial fibrillation  Acute systolic heart failure  Bradycardia    PLAN OF CARE:     1. Paroxysmal atrial fibrillation -Holter showed questionable A. fib but today's EKG shows sinus versus ectopic atrial bradycardia.  He is doing very well.  EF is recovered on echocardiogram today.  Excellent functional capacity.  We will switch over to warfarin and have been followed in anticoagulation clinic.  For now we will continue amiodarone.  Decrease metoprolol dose to 12.5 mg.  I will see him back in clinic in 6 months.  2. Acute systolic " heart failure -coverage ejection fraction on echo today.  Euvolemic.  Excellent functional capacity.  Continue same.  3. Bradycardia -decreasing metoprolol dose.    Return to clinic 6 months             Discharge Medications          Accurate as of September 8, 2022 12:58 PM. If you have any questions, ask your nurse or doctor.            Continue These Medications      Instructions Start Date   amiodarone 200 MG tablet  Commonly known as: PACERONE   100 mg, Oral, Daily      apixaban 5 MG tablet tablet  Commonly known as: ELIQUIS   5 mg, Oral, Every 12 Hours Scheduled      atorvastatin 10 MG tablet  Commonly known as: LIPITOR   10 mg, Oral, Daily      Centrum Silver Adult 50+ tablet tablet  Generic drug: multivitamin with minerals   1 tablet, Oral, Daily      esomeprazole 40 MG capsule  Commonly known as: nexIUM   40 mg, Oral, Every Morning Before Breakfast      furosemide 40 MG tablet  Commonly known as: LASIX   40 mg, Oral, Daily      lisinopril 5 MG tablet  Commonly known as: PRINIVIL,ZESTRIL   5 mg, Oral, Daily      metoprolol succinate XL 25 MG 24 hr tablet  Commonly known as: TOPROL-XL   25 mg, Oral, Every Early Morning      potassium chloride 10 MEQ CR tablet   10 mEq, Oral, Daily             Thank you for allowing me to participate in the care of your patient,      Sincerely,   Renato Stern MD  [facility]  09/08/22  12:58 EDT

## 2022-09-09 ENCOUNTER — TELEPHONE (OUTPATIENT)
Dept: PHARMACY | Facility: HOSPITAL | Age: 67
End: 2022-09-09

## 2022-09-09 ENCOUNTER — TELEPHONE (OUTPATIENT)
Dept: CARDIOLOGY | Facility: CLINIC | Age: 67
End: 2022-09-09

## 2022-09-09 PROBLEM — I48.0 PAF (PAROXYSMAL ATRIAL FIBRILLATION): Status: ACTIVE | Noted: 2022-09-09

## 2022-09-09 RX ORDER — WARFARIN SODIUM 5 MG/1
5 TABLET ORAL
Qty: 30 TABLET | Refills: 11 | Status: SHIPPED | OUTPATIENT
Start: 2022-09-09 | End: 2022-09-20

## 2022-09-09 NOTE — TELEPHONE ENCOUNTER
Dr. Stern,    Pt called and asked if you could send in the prescription for the warfarin?    Thank you,    Ranad Jules, RN  Triage [facility]  09/09/22 10:42 EDT

## 2022-09-09 NOTE — TELEPHONE ENCOUNTER
Spoke with  Luis regarding referral to the medication management clinic. Patient is transitioning to warfarin from eliquis. He has about 9 days left of eliquis and a 5 mg warfarin prescription has already been sent to the pharmacy. Patient is going to continue his eliquis until he is out of it and then he will transition to warfarin. I will follow up with the patient next week to discuss transition.     We did discuss brief counseling on monitoring and diet.

## 2022-09-10 RX ORDER — METOPROLOL SUCCINATE 25 MG/1
12.5 TABLET, EXTENDED RELEASE ORAL
Qty: 45 TABLET | Refills: 1 | Status: SHIPPED | OUTPATIENT
Start: 2022-09-10 | End: 2023-01-17

## 2022-09-15 ENCOUNTER — TELEPHONE (OUTPATIENT)
Dept: PHARMACY | Facility: HOSPITAL | Age: 67
End: 2022-09-15

## 2022-09-15 NOTE — TELEPHONE ENCOUNTER
Called patient regarding transitioning from eliquis to warfarin. Patients last dose of eliquis on Monday. Patient is starting warfarin on Tuesday 9/20 and first appointment in clinic will be Friday 9/23.

## 2022-09-20 NOTE — TELEPHONE ENCOUNTER
Patient's wife called and stated that the patient was supposed to start on warfarin tonight, but he now wants to stay on the eliquis. Spoke to Dr. Stern and it is fine for the patient to do so. He can take 1 tablet tonight.  Made patient aware of the orders.      please send in an eliquis prescription for the patient to his [facility] pharmacy. He does have 1 pill left that he can take tonight.    Jackie Arana RN  Triage [facility]

## 2022-09-21 ENCOUNTER — TELEPHONE (OUTPATIENT)
Dept: PHARMACY | Facility: HOSPITAL | Age: 67
End: 2022-09-21

## 2022-09-21 ENCOUNTER — ANTICOAGULATION VISIT (OUTPATIENT)
Dept: PHARMACY | Facility: HOSPITAL | Age: 67
End: 2022-09-21

## 2022-09-21 DIAGNOSIS — I48.0 PAF (PAROXYSMAL ATRIAL FIBRILLATION): Primary | ICD-10-CM

## 2022-09-21 NOTE — PROGRESS NOTES
This encounter is for documentation purposes only. The patient decided to continue eliquis rather than switching to warfarin. Cardiology is aware and sent in a prescription for eliquis. The medication management clinic will no longer be following this patient. It has been a pleasure being a part of his care.

## 2022-09-23 ENCOUNTER — APPOINTMENT (OUTPATIENT)
Dept: PHARMACY | Facility: HOSPITAL | Age: 67
End: 2022-09-23

## 2022-09-23 RX ORDER — ATORVASTATIN CALCIUM 10 MG/1
TABLET, FILM COATED ORAL
Qty: 90 TABLET | Refills: 1 | Status: SHIPPED | OUTPATIENT
Start: 2022-09-23 | End: 2023-03-31 | Stop reason: SDUPTHER

## 2022-09-29 ENCOUNTER — PATIENT MESSAGE (OUTPATIENT)
Dept: FAMILY MEDICINE CLINIC | Facility: CLINIC | Age: 67
End: 2022-09-29

## 2022-09-29 RX ORDER — ESOMEPRAZOLE MAGNESIUM 40 MG/1
40 CAPSULE, DELAYED RELEASE ORAL
Qty: 90 CAPSULE | Refills: 3 | Status: SHIPPED | OUTPATIENT
Start: 2022-09-29

## 2022-09-29 NOTE — TELEPHONE ENCOUNTER
From: Tesfaye Smith  To: Gina Hung DO  Sent: 9/29/2022 11:40 AM EDT  Subject: Prescription for Esomeprazole    Can you send a prescription to [facility] at [phone] for Esomeprazole Magnesium Delayed Release Capsules 40mg . I take 1 cap each day and will use [facility] to fill this prescription because of the cost using my plan.

## 2022-10-05 ENCOUNTER — TELEPHONE (OUTPATIENT)
Dept: FAMILY MEDICINE CLINIC | Facility: CLINIC | Age: 67
End: 2022-10-05

## 2022-10-05 NOTE — TELEPHONE ENCOUNTER
pts wife aware of note below, she stated she would give him the message and have him call us back.

## 2022-10-05 NOTE — TELEPHONE ENCOUNTER
PA for Esomeprazole was denied. Pt must try 2 of the following:    OMEPRAZOLE  PANTOPRAZOLE  LANSOPRAZOLE    Please advise

## 2022-10-05 NOTE — TELEPHONE ENCOUNTER
Please let the patient know and ask if he would like to change prescriptions or if he would like to buy the Nexium 20 mg over-the-counter and just take 2 of those daily.  Choice is up to him.

## 2022-10-13 RX ORDER — FUROSEMIDE 40 MG/1
TABLET ORAL
Qty: 90 TABLET | Refills: 2 | Status: SHIPPED | OUTPATIENT
Start: 2022-10-13

## 2022-10-13 RX ORDER — POTASSIUM CHLORIDE 750 MG/1
10 TABLET, FILM COATED, EXTENDED RELEASE ORAL DAILY
Qty: 90 TABLET | Refills: 2 | Status: SHIPPED | OUTPATIENT
Start: 2022-10-13

## 2022-10-13 RX ORDER — POTASSIUM CHLORIDE 750 MG/1
TABLET, FILM COATED, EXTENDED RELEASE ORAL
Qty: 30 TABLET | Refills: 3 | Status: SHIPPED | OUTPATIENT
Start: 2022-10-13 | End: 2022-10-13

## 2022-10-13 RX ORDER — LISINOPRIL 5 MG/1
TABLET ORAL
Qty: 90 TABLET | Refills: 1 | Status: SHIPPED | OUTPATIENT
Start: 2022-10-13 | End: 2022-11-30 | Stop reason: SDUPTHER

## 2022-11-28 ENCOUNTER — PATIENT MESSAGE (OUTPATIENT)
Dept: CARDIOLOGY | Facility: CLINIC | Age: 67
End: 2022-11-28

## 2022-11-28 NOTE — TELEPHONE ENCOUNTER
From: Tesfaye Smith  To: Zuly CoelloNERY live  Sent: 11/28/2022 11:26 AM EST  Subject: Medical Reading These still OK with you?    11/7/2022-Monday 9:00AM 186.6 136/84 54  11/8/2022-Tuesday 9:00AM 185.4 122/79 54  11/9/2022-Wednesday 9:00AM 185.4 130/82 57  11/10/2022-Thursday 9:00AM 185.0 128/81 58  11/11/2022-Friday 9:00AM 183.8 132/82 61  11/12/2022-Saturday 9:00AM 185.0 132/87 55  11/13/2022-Sunday 9:00AM 185.8 132/80 51  11/14/2022-Monday 9:00AM 186.0 129/85 59  11/15/2022-Tuesday 9:00AM 185.8 142/90 59  11/16/2022-Wednesday 9:00AM 185.0 133/88 65  11/17/2022-Thursday 9:00AM 184.4 131/86 58  11/18/2022-Friday 9:00AM 185.0 134/83 61  11/19/2022-Saturday 9:00AM 185.6 139/88 57  11/20/2022-Sunday 9:00AM 185.2 129/81 67  11/21/2022-Monday 9:00AM 185.2 138/88 61  11/22/2022-Tuesday 9:00AM 185.8 140/88 59  11/23/2022-Wednesday 9:00AM 184.4 124/85 65  11/24/2022-Thursday 9:00AM 184.8 129/83 65  11/25/2022-Friday 9:00AM 185.6 138/85 54  11/26/2022-Saturday 9:00AM 185.2 138/92 58  11/27/2022-Sunday 9:00AM 186.0 139/89 56  11/21/2022-Monday 9:00AM 185.4 144/85 57

## 2022-12-01 RX ORDER — LISINOPRIL 5 MG/1
5 TABLET ORAL DAILY
Qty: 90 TABLET | Refills: 2 | Status: SHIPPED | OUTPATIENT
Start: 2022-12-01 | End: 2022-12-29 | Stop reason: SDUPTHER

## 2022-12-21 ENCOUNTER — PATIENT MESSAGE (OUTPATIENT)
Dept: CARDIOLOGY | Facility: CLINIC | Age: 67
End: 2022-12-21

## 2022-12-22 RX ORDER — AMIODARONE HYDROCHLORIDE 200 MG/1
100 TABLET ORAL DAILY
Qty: 90 TABLET | Refills: 2 | Status: SHIPPED | OUTPATIENT
Start: 2022-12-22 | End: 2023-03-10

## 2022-12-29 ENCOUNTER — PATIENT MESSAGE (OUTPATIENT)
Dept: CARDIOLOGY | Facility: CLINIC | Age: 67
End: 2022-12-29

## 2022-12-29 RX ORDER — LISINOPRIL 5 MG/1
10 TABLET ORAL DAILY
Qty: 90 TABLET | Refills: 2
Start: 2022-12-29 | End: 2023-03-10

## 2022-12-29 NOTE — TELEPHONE ENCOUNTER
From: Tesfaye Smith  To: Zuly Gaviota  Sent: 12/21/2022 11:50 AM EST  Subject: High Blood Pressure    My blood pressure reading are higher than I like is this OK or do I need to increse my Lisnopril  12/8/2022-Thursday 9:00AM 186.8 117/82 59  12/2/2022-Friday 9:00AM 185.4 138/89 69  12/10/2022-Saturday 9:00AM 185.0 130/85 62  12/11/2022-Sunday 9:00AM 184.6 130/90 63  12/12/2022-Monday 9:00AM 183.8 126/90 67  12/13/2022-Tuesday 9:00AM 184.8 136/86 58  12/14/2022-Wednesday 9:00AM 185.0 142/93 64  12/19/2022-Monday 9:00AM 181.4 144/90 55  12/20/2022-Tuesday 9:00AM 181.8 146/93 61  12/21/2022-Wednesday 9:00AM 182 162/91 51

## 2022-12-30 NOTE — TELEPHONE ENCOUNTER
From: Tesfaye Smith  To: Zuly Meek  Sent: 12/29/2022 4:02 PM EST  Subject: Lisinopril    Will do, both in the morning, 1 in the morning 1 with my meds at night or doesn’t matter?

## 2023-01-17 RX ORDER — METOPROLOL SUCCINATE 25 MG/1
TABLET, EXTENDED RELEASE ORAL
Qty: 90 TABLET | Refills: 1 | Status: SHIPPED | OUTPATIENT
Start: 2023-01-17 | End: 2023-03-10

## 2023-03-08 ENCOUNTER — OFFICE (AMBULATORY)
Dept: URBAN - METROPOLITAN AREA CLINIC 76 | Facility: CLINIC | Age: 68
End: 2023-03-08

## 2023-03-08 VITALS
OXYGEN SATURATION: 98 % | WEIGHT: 189 LBS | HEIGHT: 72 IN | DIASTOLIC BLOOD PRESSURE: 90 MMHG | HEART RATE: 82 BPM | SYSTOLIC BLOOD PRESSURE: 138 MMHG

## 2023-03-08 DIAGNOSIS — K21.9 GASTRO-ESOPHAGEAL REFLUX DISEASE WITHOUT ESOPHAGITIS: ICD-10-CM

## 2023-03-08 DIAGNOSIS — Z80.0 FAMILY HISTORY OF MALIGNANT NEOPLASM OF DIGESTIVE ORGANS: ICD-10-CM

## 2023-03-08 DIAGNOSIS — I48.91 UNSPECIFIED ATRIAL FIBRILLATION: ICD-10-CM

## 2023-03-08 DIAGNOSIS — K22.70 BARRETT'S ESOPHAGUS WITHOUT DYSPLASIA: ICD-10-CM

## 2023-03-08 DIAGNOSIS — Z79.01 LONG TERM (CURRENT) USE OF ANTICOAGULANTS: ICD-10-CM

## 2023-03-08 DIAGNOSIS — Z86.010 PERSONAL HISTORY OF COLONIC POLYPS: ICD-10-CM

## 2023-03-08 PROCEDURE — 99204 OFFICE O/P NEW MOD 45 MIN: CPT | Performed by: INTERNAL MEDICINE

## 2023-03-08 RX ORDER — ESOMEPRAZOLE MAGNESIUM 40 MG/1
40 CAPSULE, DELAYED RELEASE ORAL
Qty: 90 | Refills: 3 | Status: ACTIVE

## 2023-03-10 ENCOUNTER — OFFICE VISIT (OUTPATIENT)
Dept: CARDIOLOGY | Facility: CLINIC | Age: 68
End: 2023-03-10
Payer: MEDICARE

## 2023-03-10 VITALS
SYSTOLIC BLOOD PRESSURE: 136 MMHG | BODY MASS INDEX: 25.33 KG/M2 | WEIGHT: 187 LBS | HEIGHT: 72 IN | HEART RATE: 55 BPM | DIASTOLIC BLOOD PRESSURE: 86 MMHG

## 2023-03-10 DIAGNOSIS — I47.29 NONSUSTAINED VENTRICULAR TACHYCARDIA: ICD-10-CM

## 2023-03-10 DIAGNOSIS — I48.0 PAF (PAROXYSMAL ATRIAL FIBRILLATION): Primary | ICD-10-CM

## 2023-03-10 PROCEDURE — 99214 OFFICE O/P EST MOD 30 MIN: CPT | Performed by: NURSE PRACTITIONER

## 2023-03-10 PROCEDURE — 93000 ELECTROCARDIOGRAM COMPLETE: CPT | Performed by: NURSE PRACTITIONER

## 2023-03-10 RX ORDER — METOPROLOL SUCCINATE 25 MG/1
12.5 TABLET, EXTENDED RELEASE ORAL DAILY
Start: 2023-03-10

## 2023-03-10 RX ORDER — LISINOPRIL 5 MG/1
5 TABLET ORAL 2 TIMES DAILY
Qty: 180 TABLET | Refills: 2 | Status: SHIPPED | OUTPATIENT
Start: 2023-03-10 | End: 2023-03-31 | Stop reason: SDUPTHER

## 2023-03-10 NOTE — PROGRESS NOTES
Date of Office Visit: 03/10/23  Encounter Provider: NERY Stanton  Place of Service: [facility]  Patient Name: Tesfaye Smith  :1955    Chief Complaint   Patient presents with   • Atrial fibrillation, persistent (HCC)   • Hypertension   • Hyperlipidemia   • Congestive Heart Failure   • Follow-up   :     HPI: Tesfaye Smith is a 67 y.o. male  with atrial fibrillation, LV dysfunction, cardiomyopathy, hypertension, hyperlipidemia,arthritis,  azar's esophagus and GERD. .  He is followed by Dr. Renato Stern. I will visit with him in follow up today and have reviewed his medical record.   He was found to have new onset atrial fibrillation in May 2022. Echocardiogram showed moderately decreased left ventricular systolic function with an EF of approximately 34%, borderline dilated left ventricular cavity, mild to moderately dilated right ventricular cavity, mildly reduced right ventricular systolic function and mild to moderate mitral regurgitation.  He was started on metoprolol and Eliquis.  He continued to have symptomatic atrial fibrillation and had a ERIC/cardioversion on 2022.  There was no evidence of intracardiac thrombus or mass so he was successfully cardioverted to an ectopic atrial rhythm with PACs and PVCs. amio was decreased. His HR was running 53-65 so metoprolol was decreased to 25 mg twice daily and he had improved energy.    Follow-up echo 2022 showed preserved left ventricular systolic function with mildly dilated left ventricular cavity.    He presents today for reassessment.  He denies chest pain or shortness of breath palpitations.  He walks and rides his bike and stays very active.  He brought a list of home blood pressure readings which show values anywhere from 117-140/60-80s with pulse usually 50-60s.  He has no bleeding concerns with Eliquis.  No near-syncope or syncope.  He would like to come off amiodarone if possible.  No  "Known Allergies        Family and social history reviewed.     ROS  All other systems were reviewed and are negative          Objective:     Vitals:    03/10/23 0859   BP: 136/86   BP Location: Left arm   Patient Position: Sitting   Pulse: 55   Weight: 84.8 kg (187 lb)   Height: 182.9 cm (72\")     Body mass index is 25.36 kg/m².    PHYSICAL EXAM:  Pulmonary:      Effort: Pulmonary effort is normal.   Cardiovascular:      Normal rate. Regular rhythm.           ECG 12 Lead    Date/Time: 3/10/2023 9:44 AM  Performed by: Zuly Meek APRN  Authorized by: Zuly Meek APRN   Comparison: compared with previous ECG   Similar to previous ECG  Rhythm: sinus rhythm  Rate: normal              Current Outpatient Medications   Medication Sig Dispense Refill   • apixaban (ELIQUIS) 5 MG tablet tablet Take 1 tablet by mouth 2 (Two) Times a Day. 60 tablet 5   • atorvastatin (LIPITOR) 10 MG tablet TAKE 1 TABLET DAILY 90 tablet 1   • esomeprazole (nexIUM) 40 MG capsule Take 1 capsule by mouth Every Morning Before Breakfast. 90 capsule 3   • furosemide (LASIX) 40 MG tablet TAKE 1 TABLET BY MOUTH EVERY DAY 90 tablet 2   • lisinopril (PRINIVIL,ZESTRIL) 5 MG tablet Take 1 tablet by mouth 2 (Two) Times a Day. 180 tablet 2   • metoprolol succinate XL (TOPROL-XL) 25 MG 24 hr tablet Take 0.5 tablets by mouth Daily.     • Multiple Vitamins-Minerals (CENTRUM SILVER ADULT 50+) tablet Take 1 tablet by mouth Daily.     • potassium chloride 10 MEQ CR tablet Take 1 tablet by mouth Daily. 90 tablet 2     No current facility-administered medications for this visit.     Assessment:       Diagnosis Plan   1. PAF (paroxysmal atrial fibrillation) (HCC)        2. Nonsustained ventricular tachycardia             No orders of the defined types were placed in this encounter.        Plan:        1. 67 year old gentleman with persistent a fib s/p cardioversion 5/31/2022. Remains in NSR today  Plan to stop amiodarone to 100 mg daily per patient request. He " will contiue  metoprolol succinate 12.5 mg daily. He will continue Eliquis 5 mg twice daily.  He will continue to follow his blood pressure and pulse at home.  He will let me know if he has high pulse such as 90s-100s at rest.  If this occurs, he will need to come in for EKG and we may need to put him back on amiodarone.  2.  Tachycardia/arrhythmia mediated cardiomyopathy with EF 37% in may 2022 and ejection fraction normalized by September 2022. He will continue metoprolol succinate, lisinopril and Lasix with potassium replacement  3. Hypertension blood pressure appears well controlled overall.   4. Hyperlipidemia  5. Arthritis  6. GERD/ Almaraz's esophagus   7. Spider veins BLE- no edema      Follow-up in 6 months.Call with questions or concerns.           It has been a pleasure to participate in this patient's care.      Thank you,  NERY Stanton      **I used Dragon to dictate this note:**

## 2023-03-31 ENCOUNTER — PATIENT MESSAGE (OUTPATIENT)
Dept: FAMILY MEDICINE CLINIC | Facility: CLINIC | Age: 68
End: 2023-03-31
Payer: MEDICARE

## 2023-03-31 ENCOUNTER — PATIENT MESSAGE (OUTPATIENT)
Dept: CARDIOLOGY | Facility: CLINIC | Age: 68
End: 2023-03-31
Payer: MEDICARE

## 2023-03-31 RX ORDER — LISINOPRIL 10 MG/1
10 TABLET ORAL DAILY
Qty: 90 TABLET | Refills: 2 | Status: SHIPPED | OUTPATIENT
Start: 2023-03-31

## 2023-03-31 RX ORDER — ATORVASTATIN CALCIUM 10 MG/1
10 TABLET, FILM COATED ORAL DAILY
Qty: 90 TABLET | Refills: 1 | Status: SHIPPED | OUTPATIENT
Start: 2023-03-31

## 2023-03-31 NOTE — TELEPHONE ENCOUNTER
From: Tesfaye Smith  To: Gina Hung  Sent: 3/31/2023 11:28 AM EDT  Subject: Atorvastatin Calcium 10MG    [facility] and need [facility].  90 day supply

## 2023-03-31 NOTE — TELEPHONE ENCOUNTER
From: Tesfaye Smith  To: Zuly Meek  Sent: 3/31/2023 11:25 AM EDT  Subject: Lisinopril 10 MG    I need a new prescription for Lisinopril 10MG. You wrote a prescipription after I saw you in the office, it is for 5MG twice a day but my insurance is listed that because of the 1st presciption of 5 mg it will not be refilled before May.  I am not sure if this information make sense but my insurance is not going to refill it until May? Just to let you know I stopped the Amiodarone and so far everything on my reading are staying the same.

## 2023-08-03 RX ORDER — METOPROLOL SUCCINATE 25 MG/1
12.5 TABLET, EXTENDED RELEASE ORAL DAILY
Qty: 45 TABLET | Refills: 3 | Status: SHIPPED | OUTPATIENT
Start: 2023-08-03

## 2023-09-07 ENCOUNTER — OFFICE VISIT (OUTPATIENT)
Dept: FAMILY MEDICINE CLINIC | Facility: CLINIC | Age: 68
End: 2023-09-07
Payer: MEDICARE

## 2023-09-07 VITALS
HEART RATE: 66 BPM | WEIGHT: 188.8 LBS | DIASTOLIC BLOOD PRESSURE: 81 MMHG | OXYGEN SATURATION: 100 % | SYSTOLIC BLOOD PRESSURE: 125 MMHG | BODY MASS INDEX: 25.57 KG/M2 | HEIGHT: 72 IN

## 2023-09-07 DIAGNOSIS — Z79.899 ENCOUNTER FOR LONG-TERM (CURRENT) USE OF MEDICATIONS: ICD-10-CM

## 2023-09-07 DIAGNOSIS — K21.9 GASTROESOPHAGEAL REFLUX DISEASE, UNSPECIFIED WHETHER ESOPHAGITIS PRESENT: ICD-10-CM

## 2023-09-07 DIAGNOSIS — Z00.00 ENCOUNTER FOR SUBSEQUENT ANNUAL WELLNESS VISIT (AWV) IN MEDICARE PATIENT: Primary | ICD-10-CM

## 2023-09-07 DIAGNOSIS — I48.0 PAF (PAROXYSMAL ATRIAL FIBRILLATION): ICD-10-CM

## 2023-09-07 DIAGNOSIS — E78.2 MIXED HYPERLIPIDEMIA: ICD-10-CM

## 2023-09-07 DIAGNOSIS — I10 ESSENTIAL HYPERTENSION: ICD-10-CM

## 2023-09-07 NOTE — PROGRESS NOTES
The ABCs of the Annual Wellness Visit  Subsequent Medicare Wellness Visit    Subjective    Tesfaye Smith is a 68 y.o. male who presents for a Subsequent Medicare Wellness Visit.    The following portions of the patient's history were reviewed and   updated as appropriate: allergies, current medications, past family history, past medical history, past social history, past surgical history, and problem list.    Compared to one year ago, the patient feels his physical   health is the same.    Compared to one year ago, the patient feels his mental   health is the same.    Recent Hospitalizations:  He was not admitted to the hospital during the last year.       Current Medical Providers:  Patient Care Team:  Gina Hung DO as PCP - General (Family Medicine)    Outpatient Medications Prior to Visit   Medication Sig Dispense Refill    atorvastatin (LIPITOR) 10 MG tablet Take 1 tablet by mouth Daily. 90 tablet 1    esomeprazole (nexIUM) 40 MG capsule Take 1 capsule by mouth Every Morning Before Breakfast. 90 capsule 3    furosemide (LASIX) 40 MG tablet TAKE 1 TABLET BY MOUTH EVERY DAY 90 tablet 0    lisinopril (PRINIVIL,ZESTRIL) 10 MG tablet Take 1 tablet by mouth Daily. 90 tablet 2    metoprolol succinate XL (TOPROL-XL) 25 MG 24 hr tablet Take 0.5 tablets by mouth Daily. 45 tablet 3    Multiple Vitamins-Minerals (CENTRUM SILVER ADULT 50+) tablet Take 1 tablet by mouth Daily.      potassium chloride 10 MEQ CR tablet Take 1 tablet by mouth Daily. 90 tablet 2    apixaban (ELIQUIS) 5 MG tablet tablet Take 1 tablet by mouth 2 (Two) Times a Day. 180 tablet 3     No facility-administered medications prior to visit.       No opioid medication identified on active medication list. I have reviewed chart for other potential  high risk medication/s and harmful drug interactions in the elderly.        Aspirin is not on active medication list.  Aspirin use is not indicated based on review of current medical condition/s. Risk of  "harm outweighs potential benefits.  .    Patient Active Problem List   Diagnosis    Eczema    Accelerated hypertension    Acquired deformity of right foot    Hallux valgus    Metatarsalgia of right foot    Gastroesophageal reflux disease    Bilateral hip pain    Abnormal CBC    Almaraz's esophagus    Atrial fibrillation with RVR    Acute on chronic congestive heart failure    Arthritis    Hyperlipidemia    Atrial fibrillation, persistent    Acute systolic heart failure    PAF (paroxysmal atrial fibrillation)     Advance Care Planning   Advance Care Planning     Advance Directive is not on file.  ACP discussion was held with the patient during this visit. Patient has an advance directive (not in EMR), copy requested.     Objective    Vitals:    23 1403   BP: 125/81   Pulse: 66   SpO2: 100%   Weight: 85.6 kg (188 lb 12.8 oz)   Height: 182.9 cm (72\")     Estimated body mass index is 25.61 kg/m² as calculated from the following:    Height as of this encounter: 182.9 cm (72\").    Weight as of this encounter: 85.6 kg (188 lb 12.8 oz).    BMI is >= 25 and <30. (Overweight) The following options were offered after discussion;: exercise counseling/recommendations and nutrition counseling/recommendations      Does the patient have evidence of cognitive impairment? No          HEALTH RISK ASSESSMENT    Smoking Status:  Social History     Tobacco Use   Smoking Status Never   Smokeless Tobacco Never   Tobacco Comments    Caffeine use: 2 cups daily     Alcohol Consumption:  Social History     Substance and Sexual Activity   Alcohol Use Yes    Comment: occasional     Fall Risk Screen:    STEADI Fall Risk Assessment was completed, and patient is at LOW risk for falls.Assessment completed on:2023    Depression Screenin/7/2023     1:59 PM   PHQ-2/PHQ-9 Depression Screening   Little Interest or Pleasure in Doing Things 0-->not at all   Feeling Down, Depressed or Hopeless 0-->not at all   PHQ-9: Brief Depression " Severity Measure Score 0       Health Habits and Functional and Cognitive Screenin/7/2023     2:00 PM   Functional & Cognitive Status   Do you have difficulty preparing food and eating? No   Do you have difficulty bathing yourself, getting dressed or grooming yourself? No   Do you have difficulty using the toilet? No   Do you have difficulty moving around from place to place? No   Do you have trouble with steps or getting out of a bed or a chair? No   Current Diet Frequent Junk Food   Dental Exam Up to date   Eye Exam Up to date   Exercise (times per week) 1 times per week   Current Exercises Include Bicycling Outdoors;Yard Work;Walking   Do you need help using the phone?  No   Are you deaf or do you have serious difficulty hearing?  No   Do you need help to go to places out of walking distance? No   Do you need help shopping? No   Do you need help preparing meals?  No   Do you need help with housework?  No   Do you need help with laundry? No   Do you need help taking your medications? No   Do you need help managing money? No   Do you ever drive or ride in a car without wearing a seat belt? No       Age-appropriate Screening Schedule:  Refer to the list below for future screening recommendations based on patient's age, sex and/or medical conditions. Orders for these recommended tests are listed in the plan section. The patient has been provided with a written plan.    Health Maintenance   Topic Date Due    LIPID PANEL  2022    COVID-19 Vaccine (7 - Pfizer series) 2022    INFLUENZA VACCINE  10/01/2023    ANNUAL WELLNESS VISIT  2024    BMI FOLLOWUP  2024    COLORECTAL CANCER SCREENING  10/07/2024    TDAP/TD VACCINES (6 - Td or Tdap) 2029    HEPATITIS C SCREENING  Completed    Pneumococcal Vaccine 65+  Completed    ZOSTER VACCINE  Completed                  CMS Preventative Services Quick Reference  Risk Factors Identified During Encounter  Immunizations Discussed/Encouraged:  "Influenza, Prevnar 20 (Pneumococcal 20-valent conjugate), Shingrix, and COVID19  Dental Screening Recommended  Vision Screening Recommended  The above risks/problems have been discussed with the patient.  Pertinent information has been shared with the patient in the After Visit Summary.  An After Visit Summary and PPPS were made available to the patient.    Follow Up:   Next Medicare Wellness visit to be scheduled in 1 year.       Additional E&M Note during same encounter follows:  Patient has multiple medical problems which are significant and separately identifiable that require additional work above and beyond the Medicare Wellness Visit.      Chief Complaint  Medicare Wellness-subsequent    Subjective        HPI  Tesfaye Smith is also being seen today for follow-up on his chronic health conditions:    Hypertension.  The patient takes lisinopril 10 mg daily and metoprolol 25 mg 1/2 tab daily.  He states that his blood pressure is under good control now at home.     Afib.  Patient is currently taking 5 mg of Eliquis twice daily.  He is also taking metoprolol 25 mg 1/2 tab daily.  Patient denies any palpitations.    Hyperlipidemia.  ASCVD risk was 12.4% on lipids from March 2020.  He is taking atorvastatin 10 mg daily.  The patient has not noticed any side effects of the medication.  He has been taking it every day as directed.  Cholesterol was at goal.      GERD.  The patient takes Nexium 40 mg daily.  He cannot remember the last occurrence of heartburn.  He states that he does not have any side effects of that medicine.  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  He is also followed by a GI specialist.    Arthritis.  The patient states that he suffers from pain in his knees and hips intermittently.  He intermittently takes meloxicam 15 mg daily.  He denies any side effects.           Objective   Vital Signs:  /81   Pulse 66   Ht 182.9 cm (72\")   Wt 85.6 kg " (188 lb 12.8 oz)   SpO2 100%   BMI 25.61 kg/m²     Physical Exam  Vitals and nursing note reviewed.   Constitutional:       Appearance: He is well-developed.   HENT:      Head: Normocephalic and atraumatic.      Right Ear: External ear normal.      Left Ear: External ear normal.      Nose: Nose normal.   Eyes:      General: No scleral icterus.     Conjunctiva/sclera: Conjunctivae normal.   Cardiovascular:      Rate and Rhythm: Normal rate and regular rhythm.      Heart sounds: Normal heart sounds.   Pulmonary:      Effort: Pulmonary effort is normal.      Breath sounds: Normal breath sounds.   Musculoskeletal:      Cervical back: Normal range of motion and neck supple.   Lymphadenopathy:      Cervical: No cervical adenopathy.   Skin:     General: Skin is warm and dry.      Findings: No rash.   Neurological:      Mental Status: He is alert and oriented to person, place, and time.   Psychiatric:         Mood and Affect: Mood normal.         Behavior: Behavior normal.         Thought Content: Thought content normal.         Judgment: Judgment normal.        The following data was reviewed by: Gina Hung DO on 09/07/2023:                 Assessment and Plan   Diagnoses and all orders for this visit:    1. Encounter for subsequent annual wellness visit (AWV) in Medicare patient (Primary)    2. Essential hypertension  -     Comprehensive Metabolic Panel    3. Mixed hyperlipidemia  -     Lipid Panel    4. Gastroesophageal reflux disease, unspecified whether esophagitis present    5. PAF (paroxysmal atrial fibrillation)    6. Encounter for long-term (current) use of medications  -     CBC & Differential  -     Comprehensive Metabolic Panel  -     Lipid Panel  -     TSH    Other orders  -     apixaban (ELIQUIS) 5 MG tablet tablet; Take 1 tablet by mouth 2 (Two) Times a Day.  Dispense: 28 tablet; Refill: 0      RHM.  Up-to-date.    Patient is also here to follow-up on chronic stable hypertension, A-fib,  hyperlipidemia, and GERD.  Surveillance labs were obtained today and any medication changes will be made based on lab results and will be called to the patient later this week.          Follow Up   Return in about 6 months (around 3/7/2024) for HTN, AFib.  Patient was given instructions and counseling regarding his condition or for health maintenance advice. Please see specific information pulled into the AVS if appropriate.

## 2023-09-08 DIAGNOSIS — R79.89 ELEVATED SERUM CREATININE: Primary | ICD-10-CM

## 2023-09-08 LAB
ALBUMIN SERPL-MCNC: 4.2 G/DL (ref 3.9–4.9)
ALBUMIN/GLOB SERPL: 2.1 {RATIO} (ref 1.2–2.2)
ALP SERPL-CCNC: 82 IU/L (ref 44–121)
ALT SERPL-CCNC: 21 IU/L (ref 0–44)
AST SERPL-CCNC: 24 IU/L (ref 0–40)
BASOPHILS # BLD AUTO: 0.1 X10E3/UL (ref 0–0.2)
BASOPHILS NFR BLD AUTO: 1 %
BILIRUB SERPL-MCNC: 0.5 MG/DL (ref 0–1.2)
BUN SERPL-MCNC: 14 MG/DL (ref 8–27)
BUN/CREAT SERPL: 10 (ref 10–24)
CALCIUM SERPL-MCNC: 9.3 MG/DL (ref 8.6–10.2)
CHLORIDE SERPL-SCNC: 102 MMOL/L (ref 96–106)
CHOLEST SERPL-MCNC: 152 MG/DL (ref 100–199)
CO2 SERPL-SCNC: 26 MMOL/L (ref 20–29)
CREAT SERPL-MCNC: 1.35 MG/DL (ref 0.76–1.27)
EGFRCR SERPLBLD CKD-EPI 2021: 56 ML/MIN/1.73
EOSINOPHIL # BLD AUTO: 0.1 X10E3/UL (ref 0–0.4)
EOSINOPHIL NFR BLD AUTO: 1 %
ERYTHROCYTE [DISTWIDTH] IN BLOOD BY AUTOMATED COUNT: 13 % (ref 11.6–15.4)
GLOBULIN SER CALC-MCNC: 2 G/DL (ref 1.5–4.5)
GLUCOSE SERPL-MCNC: 88 MG/DL (ref 70–99)
HCT VFR BLD AUTO: 43.2 % (ref 37.5–51)
HDLC SERPL-MCNC: 78 MG/DL
HGB BLD-MCNC: 14.9 G/DL (ref 13–17.7)
IMM GRANULOCYTES # BLD AUTO: 0 X10E3/UL (ref 0–0.1)
IMM GRANULOCYTES NFR BLD AUTO: 0 %
LDLC SERPL CALC-MCNC: 61 MG/DL (ref 0–99)
LYMPHOCYTES # BLD AUTO: 1.8 X10E3/UL (ref 0.7–3.1)
LYMPHOCYTES NFR BLD AUTO: 24 %
MCH RBC QN AUTO: 31.6 PG (ref 26.6–33)
MCHC RBC AUTO-ENTMCNC: 34.5 G/DL (ref 31.5–35.7)
MCV RBC AUTO: 92 FL (ref 79–97)
MONOCYTES # BLD AUTO: 0.8 X10E3/UL (ref 0.1–0.9)
MONOCYTES NFR BLD AUTO: 11 %
NEUTROPHILS # BLD AUTO: 4.5 X10E3/UL (ref 1.4–7)
NEUTROPHILS NFR BLD AUTO: 63 %
PLATELET # BLD AUTO: 195 X10E3/UL (ref 150–450)
POTASSIUM SERPL-SCNC: 4 MMOL/L (ref 3.5–5.2)
PROT SERPL-MCNC: 6.2 G/DL (ref 6–8.5)
RBC # BLD AUTO: 4.72 X10E6/UL (ref 4.14–5.8)
SODIUM SERPL-SCNC: 144 MMOL/L (ref 134–144)
TRIGL SERPL-MCNC: 67 MG/DL (ref 0–149)
TSH SERPL DL<=0.005 MIU/L-ACNC: 0.71 UIU/ML (ref 0.45–4.5)
VLDLC SERPL CALC-MCNC: 13 MG/DL (ref 5–40)
WBC # BLD AUTO: 7.2 X10E3/UL (ref 3.4–10.8)

## 2023-09-15 ENCOUNTER — TELEPHONE (OUTPATIENT)
Dept: FAMILY MEDICINE CLINIC | Facility: CLINIC | Age: 68
End: 2023-09-15
Payer: MEDICARE

## 2023-09-22 RX ORDER — ATORVASTATIN CALCIUM 10 MG/1
TABLET, FILM COATED ORAL
Qty: 90 TABLET | Refills: 1 | Status: SHIPPED | OUTPATIENT
Start: 2023-09-22

## 2023-10-09 RX ORDER — FUROSEMIDE 40 MG/1
40 TABLET ORAL DAILY
Qty: 90 TABLET | Refills: 0 | Status: CANCELLED | OUTPATIENT
Start: 2023-10-09

## 2023-10-10 RX ORDER — POTASSIUM CHLORIDE 750 MG/1
10 TABLET, FILM COATED, EXTENDED RELEASE ORAL DAILY
Qty: 90 TABLET | Refills: 2 | Status: SHIPPED | OUTPATIENT
Start: 2023-10-10

## 2023-10-10 RX ORDER — FUROSEMIDE 40 MG/1
40 TABLET ORAL DAILY
Qty: 90 TABLET | Refills: 1 | Status: SHIPPED | OUTPATIENT
Start: 2023-10-10

## 2023-12-21 RX ORDER — LISINOPRIL 10 MG/1
10 TABLET ORAL DAILY
Qty: 90 TABLET | Refills: 2 | Status: SHIPPED | OUTPATIENT
Start: 2023-12-21

## 2024-01-09 RX ORDER — POTASSIUM CHLORIDE 750 MG/1
10 TABLET, FILM COATED, EXTENDED RELEASE ORAL DAILY
Qty: 90 TABLET | Refills: 2 | Status: SHIPPED | OUTPATIENT
Start: 2024-01-09

## 2024-01-09 RX ORDER — FUROSEMIDE 40 MG/1
40 TABLET ORAL DAILY
Qty: 90 TABLET | Refills: 1 | Status: SHIPPED | OUTPATIENT
Start: 2024-01-09

## 2024-01-24 RX ORDER — METOPROLOL SUCCINATE 25 MG/1
12.5 TABLET, EXTENDED RELEASE ORAL DAILY
Qty: 45 TABLET | Refills: 3 | Status: SHIPPED | OUTPATIENT
Start: 2024-01-24

## 2024-01-28 NOTE — PROGRESS NOTES
"Foot Follow Up      Patient: Tesfaye Smith    YOB: 1955 61 y.o. male    Chief Complaints: Foot doing well    History of Present Illness: Follows up right first MTP fusion and open treatment of second MTP dislocation from 1/24/17.  Overall he is been doing much better.  He's also had a cockup deformity of his left second toe but is not been particularly painful.  He only gets minimal occasional brief achiness in the dorsum of his right second MTP joint when he walks for prolonged period of time.  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  Right foot shows only mild swelling to the forefoot with some swelling in the second toe.  There was no pain with range of motion of the second MTP joint but only had about 40° of dorsiflexion.  No instability noted.  Neutral alignment to the first MTP joint without discomfort.  Good motion to the right first IP joint      Radiology: 3 views of the right foot were ordered to evaluate postoperative alignment reviewed and compared with x-rays taken at last exam first MTP joint fusion appears to be in good alignment hardware is intact.  Second metatarsal osteotomy appears to be in good alignment as well.  Second MTP and IP joints are neutral alignment      Assessment/Plan:  1.  Status post right first MTP fusion and open treatment of second MTP dislocation with " chlorhexidine "hammertoe.  Overall is doing quite well counseled he may have persistent swelling but overall is getting around well with activities of recommended use of plates for work in the garden he may go barefooted Mike is \"watering.  He's be very careful take it easy with this he has any increase in pain he'll let me know.    Regarding his left second toe was not bothering him bad enough for surgery at this point we'll keep an eye on things.  I'll see him back in 3 months x-rays of his right foot.  Left foot if he is having any pain in the hammertoe.  " chlorhexidine chlorhexidine

## 2024-03-07 ENCOUNTER — OFFICE VISIT (OUTPATIENT)
Dept: FAMILY MEDICINE CLINIC | Facility: CLINIC | Age: 69
End: 2024-03-07
Payer: MEDICARE

## 2024-03-07 VITALS
DIASTOLIC BLOOD PRESSURE: 85 MMHG | SYSTOLIC BLOOD PRESSURE: 133 MMHG | HEIGHT: 72 IN | OXYGEN SATURATION: 99 % | WEIGHT: 190.4 LBS | BODY MASS INDEX: 25.79 KG/M2 | HEART RATE: 63 BPM

## 2024-03-07 DIAGNOSIS — Z12.5 SCREENING FOR MALIGNANT NEOPLASM OF PROSTATE: ICD-10-CM

## 2024-03-07 DIAGNOSIS — R79.89 ELEVATED SERUM CREATININE: ICD-10-CM

## 2024-03-07 DIAGNOSIS — K21.9 GASTROESOPHAGEAL REFLUX DISEASE, UNSPECIFIED WHETHER ESOPHAGITIS PRESENT: ICD-10-CM

## 2024-03-07 DIAGNOSIS — I10 ESSENTIAL HYPERTENSION: Primary | ICD-10-CM

## 2024-03-07 DIAGNOSIS — Z79.899 ENCOUNTER FOR LONG-TERM (CURRENT) USE OF MEDICATIONS: ICD-10-CM

## 2024-03-07 DIAGNOSIS — E78.2 MIXED HYPERLIPIDEMIA: ICD-10-CM

## 2024-03-07 DIAGNOSIS — I48.0 PAF (PAROXYSMAL ATRIAL FIBRILLATION): ICD-10-CM

## 2024-03-07 PROCEDURE — 1160F RVW MEDS BY RX/DR IN RCRD: CPT | Performed by: FAMILY MEDICINE

## 2024-03-07 PROCEDURE — 3079F DIAST BP 80-89 MM HG: CPT | Performed by: FAMILY MEDICINE

## 2024-03-07 PROCEDURE — 1159F MED LIST DOCD IN RCRD: CPT | Performed by: FAMILY MEDICINE

## 2024-03-07 PROCEDURE — 99214 OFFICE O/P EST MOD 30 MIN: CPT | Performed by: FAMILY MEDICINE

## 2024-03-07 PROCEDURE — 3075F SYST BP GE 130 - 139MM HG: CPT | Performed by: FAMILY MEDICINE

## 2024-03-07 NOTE — PROGRESS NOTES
"Chief Complaint  Hypertension    Subjective        Tesfaye Smith presents to [facility]  History of Present Illness  Patient is here for follow-up on his chronic health conditions:    Hypertension.  The patient takes lisinopril 10 mg daily and metoprolol 25 mg 1/2 tab daily.  He states that his blood pressure is under good control now at home.     Afib.  Patient is currently taking 5 mg of Eliquis twice daily.  He is also taking metoprolol 25 mg 1/2 tab daily.  Patient denies any palpitations.    Hyperlipidemia.  ASCVD risk was 12.4% on lipids from March 2020.  He is taking atorvastatin 10 mg daily.  The patient has not noticed any side effects of the medication.  He has been taking it every day as directed.  Cholesterol was at goal.      GERD.  The patient takes Nexium 40 mg daily.  He cannot remember the last occurrence of heartburn.  He states that he does not have any side effects of that medicine.  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  He is also followed by a GI specialist.    Arthritis.  The patient states that he suffers from pain in his knees and hips intermittently.  He no longer takes meloxicam 15 mg daily due to the Eliquis.  Uses tylenol in place if needed.        Objective   Vital Signs:  /85   Pulse 63   Ht 182.9 cm (72\")   Wt 86.4 kg (190 lb 6.4 oz)   SpO2 99%   BMI 25.82 kg/m²   Estimated body mass index is 25.82 kg/m² as calculated from the following:    Height as of this encounter: 182.9 cm (72\").    Weight as of this encounter: 86.4 kg (190 lb 6.4 oz).         Physical Exam  Vitals and nursing note reviewed.   Constitutional:       Appearance: Normal appearance. He is well-developed and normal weight.   HENT:      Head: Normocephalic and atraumatic.   Eyes:      General: No scleral icterus.     Conjunctiva/sclera: Conjunctivae normal.   Cardiovascular:      Rate and Rhythm: Normal rate and regular rhythm. "      Heart sounds: Normal heart sounds.   Pulmonary:      Effort: Pulmonary effort is normal.      Breath sounds: Normal breath sounds.   Abdominal:      General: Bowel sounds are normal.      Palpations: Abdomen is soft.   Musculoskeletal:         General: Normal range of motion.      Cervical back: Normal range of motion and neck supple.      Right lower leg: No edema.      Left lower leg: No edema.   Skin:     General: Skin is warm and dry.      Capillary Refill: Capillary refill takes less than 2 seconds.      Findings: No rash.   Neurological:      Mental Status: He is alert and oriented to person, place, and time.   Psychiatric:         Mood and Affect: Mood normal.         Behavior: Behavior normal.         Thought Content: Thought content normal.         Judgment: Judgment normal.        Result Review :    The following data was reviewed by: Gina Hung DO on 03/07/2024:  Common labs          9/7/2023    14:46 9/18/2023    11:03   Common Labs   Glucose 88  94    BUN 14  18    Creatinine 1.35  1.09    Sodium 144  144    Potassium 4.0  4.0    Chloride 102  105    Calcium 9.3  9.8    Total Protein 6.2     Albumin 4.2     Total Bilirubin 0.5     Alkaline Phosphatase 82     AST (SGOT) 24     ALT (SGPT) 21     WBC 7.2     Hemoglobin 14.9     Hematocrit 43.2     Platelets 195     Total Cholesterol 152     Triglycerides 67     HDL Cholesterol 78     LDL Cholesterol  61       TSH          9/7/2023    14:46   TSH   TSH 0.707                   Assessment and Plan     Diagnoses and all orders for this visit:    1. Essential hypertension (Primary)  -     Comprehensive Metabolic Panel    2. Mixed hyperlipidemia    3. Gastroesophageal reflux disease, unspecified whether esophagitis present    4. PAF (paroxysmal atrial fibrillation)    5. Elevated serum creatinine  -     Comprehensive Metabolic Panel    6. Encounter for long-term (current) use of medications  -     Comprehensive Metabolic Panel  -     CBC &  Differential    7. Screening for malignant neoplasm of prostate  -     PSA Screen    Patient is here today for chronic stable hypertension, hyperlipidemia, GERD, and A-fib.  Surveillance labs were obtained today and any medication changes will be made based on lab results and will be called to the patient later this week.          Follow Up     Return in about 6 months (around 9/9/2024) for Medicare Wellness, HTN.  Patient was given instructions and counseling regarding his condition or for health maintenance advice. Please see specific information pulled into the AVS if appropriate.

## 2024-03-08 ENCOUNTER — OFFICE VISIT (OUTPATIENT)
Dept: CARDIOLOGY | Facility: CLINIC | Age: 69
End: 2024-03-08
Payer: MEDICARE

## 2024-03-08 VITALS
SYSTOLIC BLOOD PRESSURE: 140 MMHG | OXYGEN SATURATION: 98 % | HEART RATE: 57 BPM | RESPIRATION RATE: 16 BRPM | HEIGHT: 72 IN | BODY MASS INDEX: 25.73 KG/M2 | WEIGHT: 190 LBS | DIASTOLIC BLOOD PRESSURE: 80 MMHG

## 2024-03-08 DIAGNOSIS — I49.3 PVC (PREMATURE VENTRICULAR CONTRACTION): ICD-10-CM

## 2024-03-08 DIAGNOSIS — R94.31 ABNORMAL EKG: ICD-10-CM

## 2024-03-08 DIAGNOSIS — I48.0 PAF (PAROXYSMAL ATRIAL FIBRILLATION): Primary | ICD-10-CM

## 2024-03-08 LAB
ALBUMIN SERPL-MCNC: 4.2 G/DL (ref 3.5–5.2)
ALBUMIN/GLOB SERPL: 2 G/DL
ALP SERPL-CCNC: 78 U/L (ref 39–117)
ALT SERPL-CCNC: 17 U/L (ref 1–41)
AST SERPL-CCNC: 19 U/L (ref 1–40)
BASOPHILS # BLD AUTO: 0.04 10*3/MM3 (ref 0–0.2)
BASOPHILS NFR BLD AUTO: 0.5 % (ref 0–1.5)
BILIRUB SERPL-MCNC: 0.6 MG/DL (ref 0–1.2)
BUN SERPL-MCNC: 17 MG/DL (ref 8–23)
BUN/CREAT SERPL: 16.3 (ref 7–25)
CALCIUM SERPL-MCNC: 8.9 MG/DL (ref 8.6–10.5)
CHLORIDE SERPL-SCNC: 103 MMOL/L (ref 98–107)
CO2 SERPL-SCNC: 26.7 MMOL/L (ref 22–29)
CREAT SERPL-MCNC: 1.04 MG/DL (ref 0.76–1.27)
EGFRCR SERPLBLD CKD-EPI 2021: 77.2 ML/MIN/1.73
EOSINOPHIL # BLD AUTO: 0.1 10*3/MM3 (ref 0–0.4)
EOSINOPHIL NFR BLD AUTO: 1.2 % (ref 0.3–6.2)
ERYTHROCYTE [DISTWIDTH] IN BLOOD BY AUTOMATED COUNT: 13.1 % (ref 12.3–15.4)
GLOBULIN SER CALC-MCNC: 2.1 GM/DL
GLUCOSE SERPL-MCNC: 84 MG/DL (ref 65–99)
HCT VFR BLD AUTO: 44.5 % (ref 37.5–51)
HGB BLD-MCNC: 14.9 G/DL (ref 13–17.7)
IMM GRANULOCYTES # BLD AUTO: 0.02 10*3/MM3 (ref 0–0.05)
IMM GRANULOCYTES NFR BLD AUTO: 0.2 % (ref 0–0.5)
LYMPHOCYTES # BLD AUTO: 1.41 10*3/MM3 (ref 0.7–3.1)
LYMPHOCYTES NFR BLD AUTO: 17.5 % (ref 19.6–45.3)
MCH RBC QN AUTO: 30.7 PG (ref 26.6–33)
MCHC RBC AUTO-ENTMCNC: 33.5 G/DL (ref 31.5–35.7)
MCV RBC AUTO: 91.8 FL (ref 79–97)
MONOCYTES # BLD AUTO: 0.76 10*3/MM3 (ref 0.1–0.9)
MONOCYTES NFR BLD AUTO: 9.4 % (ref 5–12)
NEUTROPHILS # BLD AUTO: 5.72 10*3/MM3 (ref 1.7–7)
NEUTROPHILS NFR BLD AUTO: 71.2 % (ref 42.7–76)
NRBC BLD AUTO-RTO: 0 /100 WBC (ref 0–0.2)
PLATELET # BLD AUTO: 169 10*3/MM3 (ref 140–450)
POTASSIUM SERPL-SCNC: 3.9 MMOL/L (ref 3.5–5.2)
PROT SERPL-MCNC: 6.3 G/DL (ref 6–8.5)
PSA SERPL-MCNC: 0.59 NG/ML (ref 0–4)
RBC # BLD AUTO: 4.85 10*6/MM3 (ref 4.14–5.8)
SODIUM SERPL-SCNC: 142 MMOL/L (ref 136–145)
WBC # BLD AUTO: 8.05 10*3/MM3 (ref 3.4–10.8)

## 2024-03-08 PROCEDURE — 99214 OFFICE O/P EST MOD 30 MIN: CPT | Performed by: INTERNAL MEDICINE

## 2024-03-08 PROCEDURE — 3079F DIAST BP 80-89 MM HG: CPT | Performed by: INTERNAL MEDICINE

## 2024-03-08 PROCEDURE — 1159F MED LIST DOCD IN RCRD: CPT | Performed by: INTERNAL MEDICINE

## 2024-03-08 PROCEDURE — 3077F SYST BP >= 140 MM HG: CPT | Performed by: INTERNAL MEDICINE

## 2024-03-08 PROCEDURE — 93000 ELECTROCARDIOGRAM COMPLETE: CPT | Performed by: INTERNAL MEDICINE

## 2024-03-08 PROCEDURE — 1160F RVW MEDS BY RX/DR IN RCRD: CPT | Performed by: INTERNAL MEDICINE

## 2024-03-08 NOTE — PROGRESS NOTES
[facility]      Patient Name: Tesfaye Smith  :1955  Age: 68 y.o.  Encounter Provider:  Renato Stern Jr, MD      Chief Complaint: Follow-up acute systolic heart failure and atrial fibrillation      HPI  Tesfaye Smith is a 68 y.o. male with past medical history of acute systolic heart failure and atrial fibrillation who presents for routine follow-up.     Last clinic visit note: I originally saw him in the hospital on Memorial Day weekend 2022 where he presented with atrial fibrillation with RVR and LV dysfunction.  He was started on Eliquis and metoprolol and underwent cardioversion with successful restoration of sinus rhythm.  He was seen by Zuly London in hospital follow-up and heart rate was running on the low side so his metoprolol dosing was decreased.  Has been doing extremely well since then.  He has improved activity and is currently cycling 14 miles 2-3 times per week.  The problem he is having is with Eliquis due to financial feasibility.  No bleeding complications.  Heart rate remains on the low side today in clinic.  He denies orthopnea, PND or edema.  No angina.  He did have a stress study with no evidence of myocardial ischemia.    He is doing well with no recent palpitations, dizziness or syncope.  Tolerating apixaban with no bleeding complications.  He brings in a blood pressure log that is very well-controlled.  He walked up all flights of stairs today with no limiting symptoms.  He denies angina.  No orthopnea, PND or edema.    The following portions of the patient's history were reviewed and updated as appropriate: allergies, current medications, past family history, past medical history, past social history, past surgical history and problem list.      Review of Systems   Constitutional: Negative for chills and fever.   HENT:  Negative for hoarse voice and sore throat.    Eyes:  Negative for double vision and photophobia.   Cardiovascular:  Negative for chest  "pain, leg swelling, near-syncope, orthopnea, palpitations, paroxysmal nocturnal dyspnea and syncope.   Respiratory:  Negative for cough and wheezing.    Skin:  Negative for poor wound healing and rash.   Musculoskeletal:  Negative for arthritis and joint swelling.   Gastrointestinal:  Negative for bloating, abdominal pain, hematemesis and hematochezia.   Neurological:  Negative for dizziness and focal weakness.   Psychiatric/Behavioral:  Negative for depression and suicidal ideas.        OBJECTIVE:   Vital Signs  Vitals:    03/08/24 0938   BP: 140/80   Pulse: 57   Resp: 16   SpO2: 98%     Estimated body mass index is 25.77 kg/m² as calculated from the following:    Height as of this encounter: 182.9 cm (72\").    Weight as of this encounter: 86.2 kg (190 lb).    Vitals reviewed.   Constitutional:       Appearance: Healthy appearance. Not in distress.   Neck:      Vascular: No JVR. JVD normal.   Pulmonary:      Effort: Pulmonary effort is normal.      Breath sounds: Normal breath sounds. No wheezing. No rhonchi. No rales.   Chest:      Chest wall: Not tender to palpatation.   Cardiovascular:      PMI at left midclavicular line. Normal rate. Regular rhythm. Normal S1. Normal S2.       Murmurs: There is no murmur.      No gallop.  No click. No rub.   Pulses:     Intact distal pulses.   Edema:     Peripheral edema absent.   Abdominal:      General: Bowel sounds are normal.      Palpations: Abdomen is soft.      Tenderness: There is no abdominal tenderness.   Musculoskeletal: Normal range of motion.         General: No tenderness. Skin:     General: Skin is warm and dry.   Neurological:      General: No focal deficit present.      Mental Status: Alert and oriented to person, place and time.         ECG 12 Lead    Date/Time: 3/8/2024 12:23 PM  Performed by: Renato Stern Jr., MD    Authorized by: Renato Stern Jr., MD  Comparison: compared with previous ECG from 3/10/2023  Similar to previous ECG  Rhythm: sinus " rhythm  Ectopy: unifocal PVCs    Clinical impression: non-specific ECG            ASSESSMENT:     Paroxysmal atrial fibrillation  Acute systolic heart failure  Bradycardia    PLAN OF CARE:     Paroxysmal atrial fibrillation - He is doing very well.  EF is recovered on echocardiogram today.  Excellent functional capacity.  Continue metoprolol and Eliquis.  Acute systolic heart failure -recovered ejection fraction on echo today.  Euvolemic.  Excellent functional capacity.  Continue same.  Bradycardia -no evidence for symptomatic bradycardia or chronotropic incompetence    Return to clinic 12 months             Discharge Medications            Accurate as of March 8, 2024  9:53 AM. If you have any questions, ask your nurse or doctor.                Continue These Medications        Instructions Start Date   apixaban 5 MG tablet tablet  Commonly known as: ELIQUIS   5 mg, Oral, 2 Times Daily      atorvastatin 10 MG tablet  Commonly known as: LIPITOR   TAKE 1 TABLET BY MOUTH EVERY DAY      Centrum Silver Adult 50+ tablet tablet  Generic drug: multivitamin with minerals   1 tablet, Oral, Daily      esomeprazole 40 MG capsule  Commonly known as: nexIUM   40 mg, Oral, Every Morning Before Breakfast      furosemide 40 MG tablet  Commonly known as: LASIX   40 mg, Oral, Daily      lisinopril 10 MG tablet  Commonly known as: PRINIVIL,ZESTRIL   10 mg, Oral, Daily      metoprolol succinate XL 25 MG 24 hr tablet  Commonly known as: TOPROL-XL   12.5 mg, Oral, Daily      potassium chloride 10 MEQ CR tablet   10 mEq, Oral, Daily               Thank you for allowing me to participate in the care of your patient,      Sincerely,   Renato Stern MD  [facility]  03/08/24  09:53 EST

## 2024-03-25 RX ORDER — APIXABAN 5 MG/1
5 TABLET, FILM COATED ORAL 2 TIMES DAILY
Qty: 180 TABLET | Refills: 3 | Status: SHIPPED | OUTPATIENT
Start: 2024-03-25

## 2024-03-25 RX ORDER — ATORVASTATIN CALCIUM 10 MG/1
10 TABLET, FILM COATED ORAL DAILY
Qty: 90 TABLET | Refills: 1 | Status: SHIPPED | OUTPATIENT
Start: 2024-03-25

## 2024-03-25 RX ORDER — LISINOPRIL 10 MG/1
10 TABLET ORAL DAILY
Qty: 90 TABLET | Refills: 3 | Status: SHIPPED | OUTPATIENT
Start: 2024-03-25

## 2024-05-24 RX ORDER — FUROSEMIDE 40 MG/1
40 TABLET ORAL DAILY
Qty: 90 TABLET | Refills: 3 | Status: SHIPPED | OUTPATIENT
Start: 2024-05-24

## 2024-08-12 RX ORDER — ATORVASTATIN CALCIUM 10 MG/1
10 TABLET, FILM COATED ORAL DAILY
Qty: 90 TABLET | Refills: 0 | Status: SHIPPED | OUTPATIENT
Start: 2024-08-12

## 2024-08-15 ENCOUNTER — TELEPHONE (OUTPATIENT)
Dept: CARDIOLOGY | Facility: CLINIC | Age: 69
End: 2024-08-15
Payer: MEDICARE

## 2024-08-15 NOTE — TELEPHONE ENCOUNTER
Hub staff attempted to follow warm transfer process and was unsuccessful     Caller: Tesfaye Smith    Relationship to patient: Self    Best call back number: [phone]    Patient is needing: PT BELIEVES HE IS CURRENTLY IN AFIB, HEART IS RACING. PT STATES HE THINKS HIS METOPROLOL IS SMOOTHING IT OUT, PT SAID HE DOESN'T THINK ITS A PROBLEM UNLESS HE STAYS IN AFIB FOR AWHILE. PT IS WANTING TO KNOW IF HE MAY NEED TO SCHEDULE AN APPT FOR THIS TO GET SEEN SOON?

## 2024-08-16 RX ORDER — METOPROLOL SUCCINATE 25 MG/1
25 TABLET, EXTENDED RELEASE ORAL DAILY
Start: 2024-08-16

## 2024-08-16 NOTE — TELEPHONE ENCOUNTER
Notified patient of recommendations. Patient verbalized understanding. FU scheduled.     Jackie Parker RN  Triage [facility]

## 2024-08-16 NOTE — TELEPHONE ENCOUNTER
Patient states that since Monday night he has been in persistent afib. HR is running 110-129. He has confirmed this with his kardia device. -130/76-83. He said he can feel his heart racing and he is a little SOA. Below are his meds:    Lasix 40mg daily  Eliquis 5mg BID  Lisinopril 10mg daily  Metoprolol 12.5mg daily  Potassium 10mEq daily    Jackie Parker RN  Triage [facility]

## 2024-08-20 ENCOUNTER — TRANSCRIBE ORDERS (OUTPATIENT)
Dept: CARDIOLOGY | Facility: CLINIC | Age: 69
End: 2024-08-20

## 2024-08-20 ENCOUNTER — OFFICE VISIT (OUTPATIENT)
Dept: CARDIOLOGY | Facility: CLINIC | Age: 69
End: 2024-08-20
Payer: MEDICARE

## 2024-08-20 ENCOUNTER — LAB (OUTPATIENT)
Dept: LAB | Facility: HOSPITAL | Age: 69
End: 2024-08-20
Payer: MEDICARE

## 2024-08-20 VITALS
HEIGHT: 72 IN | DIASTOLIC BLOOD PRESSURE: 90 MMHG | HEART RATE: 138 BPM | SYSTOLIC BLOOD PRESSURE: 140 MMHG | BODY MASS INDEX: 24.84 KG/M2 | WEIGHT: 183.4 LBS

## 2024-08-20 DIAGNOSIS — Z13.6 SCREENING FOR ISCHEMIC HEART DISEASE: ICD-10-CM

## 2024-08-20 DIAGNOSIS — Z13.6 SCREENING FOR ISCHEMIC HEART DISEASE: Primary | ICD-10-CM

## 2024-08-20 DIAGNOSIS — I48.0 PAF (PAROXYSMAL ATRIAL FIBRILLATION): Primary | ICD-10-CM

## 2024-08-20 LAB
ANION GAP SERPL CALCULATED.3IONS-SCNC: 9 MMOL/L (ref 5–15)
BUN SERPL-MCNC: 23 MG/DL (ref 8–23)
BUN/CREAT SERPL: 17.6 (ref 7–25)
CALCIUM SPEC-SCNC: 9.7 MG/DL (ref 8.6–10.5)
CHLORIDE SERPL-SCNC: 105 MMOL/L (ref 98–107)
CO2 SERPL-SCNC: 28 MMOL/L (ref 22–29)
CREAT SERPL-MCNC: 1.31 MG/DL (ref 0.76–1.27)
EGFRCR SERPLBLD CKD-EPI 2021: 58.6 ML/MIN/1.73
GLUCOSE SERPL-MCNC: 96 MG/DL (ref 65–99)
POTASSIUM SERPL-SCNC: 4.5 MMOL/L (ref 3.5–5.2)
SODIUM SERPL-SCNC: 142 MMOL/L (ref 136–145)

## 2024-08-20 PROCEDURE — 36415 COLL VENOUS BLD VENIPUNCTURE: CPT

## 2024-08-20 PROCEDURE — 3080F DIAST BP >= 90 MM HG: CPT | Performed by: INTERNAL MEDICINE

## 2024-08-20 PROCEDURE — 3077F SYST BP >= 140 MM HG: CPT | Performed by: INTERNAL MEDICINE

## 2024-08-20 PROCEDURE — 80048 BASIC METABOLIC PNL TOTAL CA: CPT

## 2024-08-20 PROCEDURE — 93000 ELECTROCARDIOGRAM COMPLETE: CPT | Performed by: INTERNAL MEDICINE

## 2024-08-20 PROCEDURE — 1159F MED LIST DOCD IN RCRD: CPT | Performed by: INTERNAL MEDICINE

## 2024-08-20 PROCEDURE — 1160F RVW MEDS BY RX/DR IN RCRD: CPT | Performed by: INTERNAL MEDICINE

## 2024-08-20 PROCEDURE — 99214 OFFICE O/P EST MOD 30 MIN: CPT | Performed by: INTERNAL MEDICINE

## 2024-08-20 RX ORDER — AMIODARONE HYDROCHLORIDE 200 MG/1
200 TABLET ORAL DAILY
Qty: 70 TABLET | Refills: 0 | Status: SHIPPED | OUTPATIENT
Start: 2024-08-20 | End: 2024-08-20 | Stop reason: SDUPTHER

## 2024-08-20 RX ORDER — AMIODARONE HYDROCHLORIDE 200 MG/1
200 TABLET ORAL DAILY
Qty: 70 TABLET | Refills: 0 | Status: SHIPPED | OUTPATIENT
Start: 2024-08-20

## 2024-08-20 NOTE — PROGRESS NOTES
[facility]      Patient Name: Tesfaye Smith  :1955  Age: 68 y.o.  Encounter Provider:  Renato Stern Jr, MD      Chief Complaint: Follow-up acute systolic heart failure and atrial fibrillation      HPI  Tesfaye Smith is a 68 y.o. male with past medical history of acute systolic heart failure and atrial fibrillation who presents for routine follow-up.     Last clinic visit note: I originally saw him in the hospital on Memorial Day weekend 2022 where he presented with atrial fibrillation with RVR and LV dysfunction.  He was started on Eliquis and metoprolol and underwent cardioversion with successful restoration of sinus rhythm.  He was seen by Zuly London in hospital follow-up and heart rate was running on the low side so his metoprolol dosing was decreased.  Has been doing extremely well since then.  He has improved activity and is currently cycling 14 miles 2-3 times per week.  The problem he is having is with Eliquis due to financial feasibility.  No bleeding complications.  Heart rate remains on the low side today in clinic.  He denies orthopnea, PND or edema.  No angina.  He did have a stress study with no evidence of myocardial ischemia.    Today he is back in atrial fibrillation.  He notes palpitations but denies dizziness or syncope.  Decreased exercise tolerance.  No chest pain or shortness of air.  No dizziness or syncope.  No orthopnea, PND or edema.  Blood pressure today in clinic is 140/90 with a heart rate of 138 bpm.    The following portions of the patient's history were reviewed and updated as appropriate: allergies, current medications, past family history, past medical history, past social history, past surgical history and problem list.      Review of Systems   Constitutional: Negative for chills and fever.   HENT:  Negative for hoarse voice and sore throat.    Eyes:  Negative for double vision and photophobia.   Cardiovascular:  Negative for chest pain, leg  "swelling, near-syncope, orthopnea, palpitations, paroxysmal nocturnal dyspnea and syncope.   Respiratory:  Negative for cough and wheezing.    Skin:  Negative for poor wound healing and rash.   Musculoskeletal:  Negative for arthritis and joint swelling.   Gastrointestinal:  Negative for bloating, abdominal pain, hematemesis and hematochezia.   Neurological:  Negative for dizziness and focal weakness.   Psychiatric/Behavioral:  Negative for depression and suicidal ideas.        OBJECTIVE:   Vital Signs  Vitals:    08/20/24 0835   BP: 140/90   Pulse: (!) 138     Estimated body mass index is 24.87 kg/m² as calculated from the following:    Height as of this encounter: 182.9 cm (72\").    Weight as of this encounter: 83.2 kg (183 lb 6.4 oz).    Vitals reviewed.   Constitutional:       Appearance: Healthy appearance. Not in distress.   Neck:      Vascular: No JVR. JVD normal.   Pulmonary:      Effort: Pulmonary effort is normal.      Breath sounds: Normal breath sounds. No wheezing. No rhonchi. No rales.   Chest:      Chest wall: Not tender to palpatation.   Cardiovascular:      PMI at left midclavicular line. Normal rate. Regular rhythm. Normal S1. Normal S2.       Murmurs: There is no murmur.      No gallop.  No click. No rub.   Pulses:     Intact distal pulses.   Edema:     Peripheral edema absent.   Abdominal:      General: Bowel sounds are normal.      Palpations: Abdomen is soft.      Tenderness: There is no abdominal tenderness.   Musculoskeletal: Normal range of motion.         General: No tenderness. Skin:     General: Skin is warm and dry.   Neurological:      General: No focal deficit present.      Mental Status: Alert and oriented to person, place and time.         ECG 12 Lead    Date/Time: 8/20/2024 8:42 AM  Performed by: Renato Stern Jr., MD    Authorized by: Renato Stern Jr., MD  Comparison: compared with previous ECG from 3/8/2024  Comparison to previous ECG: Atrial fibrillation replaces sinus " rhythm  Rhythm: atrial fibrillation  Other findings: non-specific ST-T wave changes    Clinical impression: abnormal EKG            ASSESSMENT:     Paroxysmal atrial fibrillation  Acute systolic heart failure  Bradycardia    PLAN OF CARE:     Atrial fibrillation -add amiodarone and continue Eliquis.  We will plan for cardioversion tomorrow.  Acute systolic heart failure -recovered ejection fraction on echo today.  Euvolemic.  Excellent functional capacity.  Continue same.  Bradycardia -no evidence for symptomatic bradycardia or chronotropic incompetence    Return to clinic  4 weeks             Discharge Medications            Accurate as of August 20, 2024  8:42 AM. If you have any questions, ask your nurse or doctor.                Continue These Medications        Instructions Start Date   atorvastatin 10 MG tablet  Commonly known as: LIPITOR   10 mg, Oral, Daily      Centrum Silver Adult 50+ tablet tablet  Generic drug: multivitamin with minerals   1 tablet, Oral, Daily      Eliquis 5 MG tablet tablet  Generic drug: apixaban   5 mg, Oral, 2 Times Daily      esomeprazole 40 MG capsule  Commonly known as: nexIUM   40 mg, Oral, Every Morning Before Breakfast      furosemide 40 MG tablet  Commonly known as: LASIX   40 mg, Oral, Daily      lisinopril 10 MG tablet  Commonly known as: PRINIVIL,ZESTRIL   10 mg, Oral, Daily      metoprolol succinate XL 25 MG 24 hr tablet  Commonly known as: TOPROL-XL   25 mg, Oral, Daily      potassium chloride 10 MEQ CR tablet   10 mEq, Oral, Daily               Thank you for allowing me to participate in the care of your patient,      Sincerely,   Renato Stern MD  [facility]  08/20/24  08:42 EDT

## 2024-08-20 NOTE — TELEPHONE ENCOUNTER
Caller: Tesfaye Smith    Relationship: Self    Best call back number: [phone]     Requested Prescriptions:   Requested Prescriptions     Pending Prescriptions Disp Refills    amiodarone (PACERONE) 200 MG tablet 70 tablet 0     Sig: Take 1 tablet by mouth Daily. Take two tablets twice daily for 10 days then one table once daily        Pharmacy where request should be sent: [facility] [address] [place] [address] - [phone]  - [phone]     Last office visit with prescribing clinician: 8/20/2024   Last telemedicine visit with prescribing clinician: Visit date not found   Next office visit with prescribing clinician: 3/14/2025     Additional details provided by patient: PT STATES THIS IS NOT A CONTINUOUS MEDICATION AND HE IS NEEDING TO START TONIGHT - MEDICATION WAS SENT TO [facility] AND IS NEEDING IT TO GO TO LOCAL PHARMACY Opal Sanches Rep   08/20/24 11:56 EDT

## 2024-08-21 ENCOUNTER — ANESTHESIA (OUTPATIENT)
Dept: POSTOP/PACU | Facility: HOSPITAL | Age: 69
End: 2024-08-21
Payer: MEDICARE

## 2024-08-21 ENCOUNTER — ANESTHESIA EVENT (OUTPATIENT)
Dept: POSTOP/PACU | Facility: HOSPITAL | Age: 69
End: 2024-08-21
Payer: MEDICARE

## 2024-08-21 ENCOUNTER — HOSPITAL ENCOUNTER (OUTPATIENT)
Dept: POSTOP/PACU | Facility: HOSPITAL | Age: 69
Discharge: HOME | End: 2024-08-21
Payer: MEDICARE

## 2024-08-21 VITALS
SYSTOLIC BLOOD PRESSURE: 137 MMHG | TEMPERATURE: 98 F | OXYGEN SATURATION: 100 % | DIASTOLIC BLOOD PRESSURE: 93 MMHG | HEART RATE: 77 BPM | RESPIRATION RATE: 18 BRPM

## 2024-08-21 VITALS — SYSTOLIC BLOOD PRESSURE: 107 MMHG | OXYGEN SATURATION: 100 % | DIASTOLIC BLOOD PRESSURE: 86 MMHG | HEART RATE: 76 BPM

## 2024-08-21 DIAGNOSIS — I48.0 PAF (PAROXYSMAL ATRIAL FIBRILLATION): ICD-10-CM

## 2024-08-21 LAB
QT INTERVAL: 313 MS
QT INTERVAL: 397 MS
QTC INTERVAL: 453 MS
QTC INTERVAL: 481 MS

## 2024-08-21 PROCEDURE — 92960 CARDIOVERSION ELECTRIC EXT: CPT

## 2024-08-21 PROCEDURE — 25810000003 LACTATED RINGERS PER 1000 ML: Performed by: NURSE ANESTHETIST, CERTIFIED REGISTERED

## 2024-08-21 PROCEDURE — 25010000002 PROPOFOL 10 MG/ML EMULSION: Performed by: NURSE ANESTHETIST, CERTIFIED REGISTERED

## 2024-08-21 PROCEDURE — 93005 ELECTROCARDIOGRAM TRACING: CPT | Performed by: INTERNAL MEDICINE

## 2024-08-21 RX ORDER — PROPOFOL 10 MG/ML
VIAL (ML) INTRAVENOUS AS NEEDED
Status: DISCONTINUED | OUTPATIENT
Start: 2024-08-21 | End: 2024-08-21 | Stop reason: SURG

## 2024-08-21 RX ORDER — SODIUM CHLORIDE, SODIUM LACTATE, POTASSIUM CHLORIDE, CALCIUM CHLORIDE 600; 310; 30; 20 MG/100ML; MG/100ML; MG/100ML; MG/100ML
INJECTION, SOLUTION INTRAVENOUS CONTINUOUS PRN
Status: DISCONTINUED | OUTPATIENT
Start: 2024-08-21 | End: 2024-08-21 | Stop reason: SURG

## 2024-08-21 RX ADMIN — PROPOFOL 100 MG: 10 INJECTION, EMULSION INTRAVENOUS at 07:21

## 2024-08-21 RX ADMIN — SODIUM CHLORIDE, POTASSIUM CHLORIDE, SODIUM LACTATE AND CALCIUM CHLORIDE: 600; 310; 30; 20 INJECTION, SOLUTION INTRAVENOUS at 07:21

## 2024-08-21 NOTE — ANESTHESIA POSTPROCEDURE EVALUATION
Patient: Tesfaye Smith    Procedure Summary       Date: 08/21/24 Room / Location: [facility] PACU    Anesthesia Start: 0720 Anesthesia Stop: 0727    Procedure: CARDIOVERSION EXTERNAL IN CARDIOLOGY DEPARTMENT Diagnosis:       PAF (paroxysmal atrial fibrillation)      (afib)    Scheduled Providers: Renato Stern Jr., MD Provider: Arsen Storey MD    Anesthesia Type: MAC ASA Status: 3            Anesthesia Type: MAC    Vitals  Vitals Value Taken Time   /89 08/21/24 0810   Temp     Pulse 81 08/21/24 0813   Resp 20 08/21/24 0800   SpO2 100 % 08/21/24 0813   Vitals shown include unfiled device data.        Post Anesthesia Care and Evaluation    Pain management: adequate    Airway patency: patent  Anesthetic complications: No anesthetic complications    Cardiovascular status: acceptable  Respiratory status: acceptable  Hydration status: acceptable    Comments: /90   Pulse 76   Temp 36.8 °C (98.2 °F) (Oral)   Resp 20   SpO2 100%

## 2024-08-21 NOTE — ANESTHESIA PREPROCEDURE EVALUATION
Anesthesia Evaluation     history of anesthetic complications:  PONV               Airway   Mallampati: II  TM distance: >3 FB  Neck ROM: full  Dental - normal exam     Pulmonary    (-) decreased breath sounds, wheezes  Cardiovascular - normal exam    (+) hypertension, dysrhythmias Atrial Fib, CHF , hyperlipidemia      Neuro/Psych  GI/Hepatic/Renal/Endo    (+) GERD    Musculoskeletal     Abdominal    Substance History      OB/GYN          Other   arthritis,                       Anesthesia Plan    ASA 3     MAC     intravenous induction     Anesthetic plan, risks, benefits, and alternatives have been provided, discussed and informed consent has been obtained with: patient.        CODE STATUS:

## 2024-08-21 NOTE — H&P
H&P reviewed agree with details.  Risks and benefits of procedure were explained in detail.  Questions and concerns were answered at bedside.  No contraindications to moving forward with procedure.  Follow diagnostic testing results for further treatment recommendations.    Pt found to be in atrial fibrillation at office visit yesterday. He is compliant with apixaban and has not missed any doses in the last month. Symptomatic atrial fibrillation. Proceed with CV.            [facility]      Patient Name: Tesfaye Smith  :1955  Age: 68 y.o.  Encounter Provider:  Renato Stern Jr, MD      Chief Complaint: Follow-up acute systolic heart failure and atrial fibrillation      HPI  Tesfaye Smith is a 68 y.o. male with past medical history of acute systolic heart failure and atrial fibrillation who presents for routine follow-up.     Last clinic visit note: I originally saw him in the hospital on Memorial Day weekend 2022 where he presented with atrial fibrillation with RVR and LV dysfunction.  He was started on Eliquis and metoprolol and underwent cardioversion with successful restoration of sinus rhythm.  He was seen by Zuly London in hospital follow-up and heart rate was running on the low side so his metoprolol dosing was decreased.  Has been doing extremely well since then.  He has improved activity and is currently cycling 14 miles 2-3 times per week.  The problem he is having is with Eliquis due to financial feasibility.  No bleeding complications.  Heart rate remains on the low side today in clinic.  He denies orthopnea, PND or edema.  No angina.  He did have a stress study with no evidence of myocardial ischemia.    He is doing well with no recent palpitations, dizziness or syncope.  Tolerating apixaban with no bleeding complications.  He brings in a blood pressure log that is very well-controlled.  He walked up all flights of stairs today with no limiting symptoms.  He denies angina.  No  "orthopnea, PND or edema.    The following portions of the patient's history were reviewed and updated as appropriate: allergies, current medications, past family history, past medical history, past social history, past surgical history and problem list.      Review of Systems   Constitutional: Negative for chills and fever.   HENT:  Negative for hoarse voice and sore throat.    Eyes:  Negative for double vision and photophobia.   Cardiovascular:  Negative for chest pain, leg swelling, near-syncope, orthopnea, palpitations, paroxysmal nocturnal dyspnea and syncope.   Respiratory:  Negative for cough and wheezing.    Skin:  Negative for poor wound healing and rash.   Musculoskeletal:  Negative for arthritis and joint swelling.   Gastrointestinal:  Negative for bloating, abdominal pain, hematemesis and hematochezia.   Neurological:  Negative for dizziness and focal weakness.   Psychiatric/Behavioral:  Negative for depression and suicidal ideas.        OBJECTIVE:   Vital Signs  Vitals:    08/21/24 0650   BP: 132/96   Pulse: (!) 133   Resp: 16   Temp:    SpO2: 98%     Estimated body mass index is 24.87 kg/m² as calculated from the following:    Height as of 8/20/24: 182.9 cm (72\").    Weight as of 8/20/24: 83.2 kg (183 lb 6.4 oz).    Vitals reviewed.   Constitutional:       Appearance: Healthy appearance. Not in distress.   Neck:      Vascular: No JVR. JVD normal.   Pulmonary:      Effort: Pulmonary effort is normal.      Breath sounds: Normal breath sounds. No wheezing. No rhonchi. No rales.   Chest:      Chest wall: Not tender to palpatation.   Cardiovascular:      PMI at left midclavicular line. Normal rate. Regular rhythm. Normal S1. Normal S2.       Murmurs: There is no murmur.      No gallop.  No click. No rub.   Pulses:     Intact distal pulses.   Edema:     Peripheral edema absent.   Abdominal:      General: Bowel sounds are normal.      Palpations: Abdomen is soft.      Tenderness: There is no abdominal " tenderness.   Musculoskeletal: Normal range of motion.         General: No tenderness. Skin:     General: Skin is warm and dry.   Neurological:      General: No focal deficit present.      Mental Status: Alert and oriented to person, place and time.         Procedures        ASSESSMENT:     Paroxysmal atrial fibrillation  Acute systolic heart failure  Bradycardia    PLAN OF CARE:     Paroxysmal atrial fibrillation - He is doing very well.  EF is recovered on echocardiogram today.  Excellent functional capacity.  Continue metoprolol and Eliquis.  Acute systolic heart failure -recovered ejection fraction on echo today.  Euvolemic.  Excellent functional capacity.  Continue same.  Bradycardia -no evidence for symptomatic bradycardia or chronotropic incompetence    Return to clinic 12 months             Discharge Medications        ASK your doctor about these medications        Instructions Start Date   amiodarone 200 MG tablet  Commonly known as: PACERONE   200 mg, Oral, Daily, Take two tablets twice daily for 10 days then one table once daily      atorvastatin 10 MG tablet  Commonly known as: LIPITOR   10 mg, Oral, Daily      Centrum Silver Adult 50+ tablet tablet  Generic drug: multivitamin with minerals   1 tablet, Oral, Daily      Eliquis 5 MG tablet tablet  Generic drug: apixaban   5 mg, Oral, 2 Times Daily      esomeprazole 40 MG capsule  Commonly known as: nexIUM   40 mg, Oral, Every Morning Before Breakfast      furosemide 40 MG tablet  Commonly known as: LASIX   40 mg, Oral, Daily      lisinopril 10 MG tablet  Commonly known as: PRINIVIL,ZESTRIL   10 mg, Oral, Daily      metoprolol succinate XL 25 MG 24 hr tablet  Commonly known as: TOPROL-XL   25 mg, Oral, Daily      potassium chloride 10 MEQ CR tablet   10 mEq, Oral, Daily               Thank you for allowing me to participate in the care of your patient,      Sincerely,   Renato Stern MD  [facility]  08/21/24  07:11 EDT

## 2024-09-09 ENCOUNTER — OFFICE VISIT (OUTPATIENT)
Dept: CARDIOLOGY | Facility: CLINIC | Age: 69
End: 2024-09-09
Payer: MEDICARE

## 2024-09-09 VITALS
SYSTOLIC BLOOD PRESSURE: 140 MMHG | WEIGHT: 186.8 LBS | OXYGEN SATURATION: 98 % | DIASTOLIC BLOOD PRESSURE: 80 MMHG | HEART RATE: 63 BPM | BODY MASS INDEX: 25.3 KG/M2 | HEIGHT: 72 IN

## 2024-09-09 DIAGNOSIS — I49.3 PVC (PREMATURE VENTRICULAR CONTRACTION): ICD-10-CM

## 2024-09-09 DIAGNOSIS — Z79.899 LONG TERM CURRENT USE OF AMIODARONE: ICD-10-CM

## 2024-09-09 DIAGNOSIS — I48.0 PAF (PAROXYSMAL ATRIAL FIBRILLATION): Primary | ICD-10-CM

## 2024-09-09 PROCEDURE — 3079F DIAST BP 80-89 MM HG: CPT | Performed by: NURSE PRACTITIONER

## 2024-09-09 PROCEDURE — 1159F MED LIST DOCD IN RCRD: CPT | Performed by: NURSE PRACTITIONER

## 2024-09-09 PROCEDURE — 99214 OFFICE O/P EST MOD 30 MIN: CPT | Performed by: NURSE PRACTITIONER

## 2024-09-09 PROCEDURE — 1160F RVW MEDS BY RX/DR IN RCRD: CPT | Performed by: NURSE PRACTITIONER

## 2024-09-09 PROCEDURE — 93000 ELECTROCARDIOGRAM COMPLETE: CPT | Performed by: NURSE PRACTITIONER

## 2024-09-09 PROCEDURE — 3077F SYST BP >= 140 MM HG: CPT | Performed by: NURSE PRACTITIONER

## 2024-09-09 RX ORDER — METOPROLOL SUCCINATE 25 MG/1
25 TABLET, EXTENDED RELEASE ORAL DAILY
Qty: 90 TABLET | Refills: 1 | Status: SHIPPED | OUTPATIENT
Start: 2024-09-09

## 2024-09-09 NOTE — PROGRESS NOTES
Date of Office Visit: 24  Encounter Provider: NERY Stanton  Place of Service: [facility] CARDIOLOGY  Patient Name: Tesfaye Smith  :1955    Chief Complaint   Patient presents with   • Palpitations   • Edema   • Follow-up   :     HPI: Tesfaye Smith is a 69 y.o. male  with  atrial fibrillation, LV dysfunction, cardiomyopathy, hypertension, hyperlipidemia,arthritis,  azar's esophagus and GERD.       He is followed by Dr. Renato Stern. I will visit with him in follow up today and have reviewed his medical record.     He was found to have new onset atrial fibrillation in May 2022. Echocardiogram showed moderately decreased left ventricular systolic function with an EF of approximately 34%, borderline dilated left ventricular cavity, mild to moderately dilated right ventricular cavity, mildly reduced right ventricular systolic function and mild to moderate mitral regurgitation.  He was started on metoprolol and Eliquis.  He continued to have symptomatic atrial fibrillation and had a ERIC/cardioversion on 2022.  There was no evidence of intracardiac thrombus or mass so he was successfully cardioverted to an ectopic atrial rhythm with PACs and PVCs. amio was decreased. His HR was running 53-65 so metoprolol was decreased to 25 mg twice daily and he had improved energy.   Follow-up echo 2022 showed preserved left ventricular systolic function with mildly dilated left ventricular cavity.      He had recurrent atrial fibrillation with elevated heart rate and metoprolol was increased from 12.5 mg daily to 25 mg daily..  On 2024 he was loaded with amiodarone 200 mg twice daily x 10 days and had cardioversion 2024.  He is now currently taking amiodarone 200 mg daily.  He has had no sensation of atrial fibrillation.  No blood in the urine or stool with Eliquis.  He denies dizziness or shortness of breath or chest pain.  He tells me his wife is having  "intermittent high heart rate at home and has an appointment here to follow-up in a month.    No Known Allergies        Family and social history reviewed.     ROS  All other systems were reviewed and are negative          Objective:     Vitals:    09/09/24 1123   BP: 140/80   BP Location: Left arm   Patient Position: Sitting   Cuff Size: Adult   Pulse: 63   SpO2: 98%   Weight: 84.7 kg (186 lb 12.8 oz)   Height: 182.9 cm (72\")     Body mass index is 25.33 kg/m².    PHYSICAL EXAM:  Cardiovascular:      Normal rate. Regular rhythm.      Comments: Spider veins bilateral lower extremity, no pitting edema        ECG 12 Lead    Date/Time: 9/9/2024 11:32 AM  Performed by: Zuly Meek APRN    Authorized by: Zuly Meek APRN  Comparison: compared with previous ECG   Similar to previous ECG  Rhythm: sinus rhythm  Rate: normal  QRS axis: normal  Comments: Qtc stable- back in nsr          Current Outpatient Medications   Medication Sig Dispense Refill   • amiodarone (PACERONE) 200 MG tablet Take 1 tablet by mouth Daily. Take two tablets twice daily for 10 days then one table once daily 70 tablet 0   • atorvastatin (LIPITOR) 10 MG tablet TAKE 1 TABLET BY MOUTH DAILY 90 tablet 0   • Eliquis 5 MG tablet tablet TAKE 1 TABLET BY MOUTH TWICE  DAILY 180 tablet 3   • esomeprazole (nexIUM) 40 MG capsule Take 1 capsule by mouth Every Morning Before Breakfast. 90 capsule 3   • furosemide (LASIX) 40 MG tablet TAKE 1 TABLET BY MOUTH DAILY 90 tablet 3   • lisinopril (PRINIVIL,ZESTRIL) 10 MG tablet Take 1 tablet by mouth Daily. 90 tablet 3   • metoprolol succinate XL (TOPROL-XL) 25 MG 24 hr tablet Take 1 tablet by mouth Daily. 90 tablet 1   • Multiple Vitamins-Minerals (CENTRUM SILVER ADULT 50+) tablet Take 1 tablet by mouth Daily.     • potassium chloride 10 MEQ CR tablet Take 1 tablet by mouth Daily. 90 tablet 2     No current facility-administered medications for this visit.     Assessment:       Diagnosis Plan   1. PAF (paroxysmal " atrial fibrillation)  ECG 12 Lead      2. Long term current use of amiodarone  ECG 12 Lead      3. PVC (premature ventricular contraction)             Orders Placed This Encounter   Procedures   • ECG 12 Lead     This order was created via procedure documentation     Order Specific Question:   Release to patient     Answer:   Routine Release [[id number]]         Plan:        1. 67 year old gentleman with persistent a fib s/p cardioversion 5/31/2022.  He then had recurrent atrial fibrillation with repeat cardioversion while on amiodarone 8/21/2024.  He is currently maintaining normal sinus rhythm with stable QTc interval on amiodarone 200 mg daily.  Once he completes his supply of amiodarone he will stay on Eliquis 5 mg twice daily and metoprolol succinate 25 mg daily.  We will arrange follow-up in the office in 4 to 6 weeks.   2.  Tachycardia/arrhythmia mediated cardiomyopathy with EF 37% in may 2022 and ejection fraction normalized by September 2022  He will continue 25 mg daily metoprolol succinate, lisinopril and Lasix 40 mg daily with potassium replacement  3. Hypertension blood pressure appears well controlled overall.   4. Hyperlipidemia continue atorvastatin 10 mg daily  5. Arthritis  6. GERD/ Almaraz's esophagus   7. Spider veins BLE- no pitting edema today.  We discussed monitoring sodium and elevating legs after a long day of standing or sitting.  He verbalized understanding  8.  Essential tremor upper extremities    Follow-up in 4 to 6 weeks call with any issues prior to then.        It has been a pleasure to participate in this patient's care.      Thank you,  NERY Stanton      **I used Dragon to dictate this note:**

## 2024-09-10 ENCOUNTER — OFFICE VISIT (OUTPATIENT)
Dept: FAMILY MEDICINE CLINIC | Facility: CLINIC | Age: 69
End: 2024-09-10
Payer: MEDICARE

## 2024-09-10 VITALS
DIASTOLIC BLOOD PRESSURE: 64 MMHG | TEMPERATURE: 98.3 F | BODY MASS INDEX: 25.17 KG/M2 | OXYGEN SATURATION: 98 % | HEART RATE: 62 BPM | SYSTOLIC BLOOD PRESSURE: 118 MMHG | HEIGHT: 72 IN | WEIGHT: 185.8 LBS

## 2024-09-10 DIAGNOSIS — I48.0 PAF (PAROXYSMAL ATRIAL FIBRILLATION): ICD-10-CM

## 2024-09-10 DIAGNOSIS — E78.2 MIXED HYPERLIPIDEMIA: ICD-10-CM

## 2024-09-10 DIAGNOSIS — R79.89 ELEVATED SERUM CREATININE: ICD-10-CM

## 2024-09-10 DIAGNOSIS — Z00.00 MEDICARE ANNUAL WELLNESS VISIT, SUBSEQUENT: Primary | ICD-10-CM

## 2024-09-10 DIAGNOSIS — Z79.899 ENCOUNTER FOR LONG-TERM (CURRENT) USE OF MEDICATIONS: ICD-10-CM

## 2024-09-10 DIAGNOSIS — K21.9 GASTROESOPHAGEAL REFLUX DISEASE, UNSPECIFIED WHETHER ESOPHAGITIS PRESENT: ICD-10-CM

## 2024-09-10 DIAGNOSIS — I10 ESSENTIAL HYPERTENSION: ICD-10-CM

## 2024-09-10 PROCEDURE — 3074F SYST BP LT 130 MM HG: CPT | Performed by: FAMILY MEDICINE

## 2024-09-10 PROCEDURE — 1170F FXNL STATUS ASSESSED: CPT | Performed by: FAMILY MEDICINE

## 2024-09-10 PROCEDURE — 3078F DIAST BP <80 MM HG: CPT | Performed by: FAMILY MEDICINE

## 2024-09-10 PROCEDURE — G0439 PPPS, SUBSEQ VISIT: HCPCS | Performed by: FAMILY MEDICINE

## 2024-09-10 PROCEDURE — 1125F AMNT PAIN NOTED PAIN PRSNT: CPT | Performed by: FAMILY MEDICINE

## 2024-09-10 PROCEDURE — 99214 OFFICE O/P EST MOD 30 MIN: CPT | Performed by: FAMILY MEDICINE

## 2024-09-10 PROCEDURE — 1160F RVW MEDS BY RX/DR IN RCRD: CPT | Performed by: FAMILY MEDICINE

## 2024-09-10 PROCEDURE — 1159F MED LIST DOCD IN RCRD: CPT | Performed by: FAMILY MEDICINE

## 2024-09-10 RX ORDER — POTASSIUM CHLORIDE 750 MG/1
10 TABLET, EXTENDED RELEASE ORAL DAILY
Qty: 90 TABLET | Refills: 2 | Status: SHIPPED | OUTPATIENT
Start: 2024-09-10

## 2024-09-10 NOTE — PROGRESS NOTES
Subjective   The ABCs of the Annual Wellness Visit  Medicare Wellness Visit      Tesfaye Smith is a 69 y.o. patient who presents for a Medicare Wellness Visit.    The following portions of the patient's history were reviewed and   updated as appropriate: allergies, current medications, past family history, past medical history, past social history, past surgical history, and problem list.    Compared to one year ago, the patient's physical   health is the same.  Compared to one year ago, the patient's mental   health is the same.    Recent Hospitalizations:  He was not admitted to the hospital during the last year.     Current Medical Providers:  Patient Care Team:  Gina Hung DO as PCP - General (Family Medicine)    Outpatient Medications Prior to Visit   Medication Sig Dispense Refill    amiodarone (PACERONE) 200 MG tablet Take 1 tablet by mouth Daily. Take two tablets twice daily for 10 days then one table once daily 70 tablet 0    atorvastatin (LIPITOR) 10 MG tablet TAKE 1 TABLET BY MOUTH DAILY 90 tablet 0    Eliquis 5 MG tablet tablet TAKE 1 TABLET BY MOUTH TWICE  DAILY 180 tablet 3    esomeprazole (nexIUM) 40 MG capsule Take 1 capsule by mouth Every Morning Before Breakfast. 90 capsule 3    furosemide (LASIX) 40 MG tablet TAKE 1 TABLET BY MOUTH DAILY 90 tablet 3    lisinopril (PRINIVIL,ZESTRIL) 10 MG tablet Take 1 tablet by mouth Daily. 90 tablet 3    metoprolol succinate XL (TOPROL-XL) 25 MG 24 hr tablet Take 1 tablet by mouth Daily. 90 tablet 1    Multiple Vitamins-Minerals (CENTRUM SILVER ADULT 50+) tablet Take 1 tablet by mouth Daily.      potassium chloride 10 MEQ CR tablet Take 1 tablet by mouth Daily. 90 tablet 2     No facility-administered medications prior to visit.     No opioid medication identified on active medication list. I have reviewed chart for other potential  high risk medication/s and harmful drug interactions in the elderly.      Aspirin is not on active medication list.   "Aspirin use is not indicated based on review of current medical condition/s. Risk of harm outweighs potential benefits.  .    Patient Active Problem List   Diagnosis    Eczema    Accelerated hypertension    Acquired deformity of right foot    Hallux valgus    Metatarsalgia of right foot    Gastroesophageal reflux disease    Bilateral hip pain    Abnormal CBC    Almaraz's esophagus    Atrial fibrillation with RVR    Acute on chronic congestive heart failure    Arthritis    Hyperlipidemia    Atrial fibrillation, persistent    Acute systolic heart failure    PAF (paroxysmal atrial fibrillation)     Advance Care Planning Advance Directive is not on file.  ACP discussion was held with the patient during this visit. Patient does not have an advance directive, information provided.            Objective   Vitals:    09/10/24 1047   BP: 118/64   Pulse: 62   Temp: 98.3 °F (36.8 °C)   SpO2: 98%   Weight: 84.3 kg (185 lb 12.8 oz)   Height: 182.9 cm (72\")       Estimated body mass index is 25.2 kg/m² as calculated from the following:    Height as of this encounter: 182.9 cm (72\").    Weight as of this encounter: 84.3 kg (185 lb 12.8 oz).    BMI is >= 25 and <30. (Overweight) The following options were offered after discussion;: exercise counseling/recommendations and nutrition counseling/recommendations       Does the patient have evidence of cognitive impairment? No                                                                                                Health  Risk Assessment    Smoking Status:  Social History     Tobacco Use   Smoking Status Never    Passive exposure: Never   Smokeless Tobacco Never   Tobacco Comments    Caffeine use: 2 cups daily     Alcohol Consumption:  Social History     Substance and Sexual Activity   Alcohol Use Yes    Comment: occasional       Fall Risk Screen  STEADI Fall Risk Assessment was completed, and patient is at LOW risk for falls.Assessment completed on:9/10/2024    Depression " Screenin/10/2024    10:45 AM   PHQ-2/PHQ-9 Depression Screening   Little Interest or Pleasure in Doing Things 0-->not at all   Feeling Down, Depressed or Hopeless 0-->not at all   PHQ-9: Brief Depression Severity Measure Score 0     Health Habits and Functional and Cognitive Screenin/10/2024    10:45 AM   Functional & Cognitive Status   Do you have difficulty preparing food and eating? No   Do you have difficulty bathing yourself, getting dressed or grooming yourself? No   Do you have difficulty using the toilet? No   Do you have difficulty moving around from place to place? No   Do you have trouble with steps or getting out of a bed or a chair? No   Current Diet Well Balanced Diet   Dental Exam Up to date   Eye Exam Up to date   Exercise (times per week) 4 times per week   Current Exercises Include House Cleaning;Weightlifting;Home Exercise Program (TV, Computer, Etc.);Yard Work   Do you need help using the phone?  No   Are you deaf or do you have serious difficulty hearing?  No   Do you need help to go to places out of walking distance? No   Do you need help shopping? No   Do you need help preparing meals?  No   Do you need help with housework?  No   Do you need help with laundry? No   Do you need help taking your medications? No   Do you need help managing money? No   Do you ever drive or ride in a car without wearing a seat belt? No   Have you felt unusual stress, anger or loneliness in the last month? No   Who do you live with? Spouse   If you need help, do you have trouble finding someone available to you? No   Do you have difficulty concentrating, remembering or making decisions? No           Age-appropriate Screening Schedule:  Refer to the list below for future screening recommendations based on patient's age, sex and/or medical conditions. Orders for these recommended tests are listed in the plan section. The patient has been provided with a written plan.    Health Maintenance  List  Health Maintenance   Topic Date Due    COVID-19 Vaccine (8 - 2023-24 season) 09/01/2024    ANNUAL WELLNESS VISIT  09/07/2024    LIPID PANEL  09/07/2024    INFLUENZA VACCINE  08/01/2024    COLORECTAL CANCER SCREENING  01/01/2025 (Originally 1955)    BMI FOLLOWUP  09/10/2025    TDAP/TD VACCINES (6 - Td or Tdap) 11/08/2029    HEPATITIS C SCREENING  Completed    Pneumococcal Vaccine 65+  Completed    ZOSTER VACCINE  Completed                                                                                                                                                CMS Preventative Services Quick Reference  Risk Factors Identified During Encounter  Immunizations Discussed/Encouraged: Influenza, Prevnar 20 (Pneumococcal 20-valent conjugate), Shingrix, COVID19, and RSV (Respiratory Syncytial Virus)  Dental Screening Recommended  Vision Screening Recommended    The above risks/problems have been discussed with the patient.  Pertinent information has been shared with the patient in the After Visit Summary.  An After Visit Summary and PPPS were made available to the patient.    Follow Up:   Next Medicare Wellness visit to be scheduled in 1 year.         Additional E&M Note during same encounter follows:  Patient has additional, significant, and separately identifiable condition(s)/problem(s) that require work above and beyond the Medicare Wellness Visit     Chief Complaint  Medicare Wellness-subsequent, Hypertension, and Hyperlipidemia    Subjective   HPI  Tesfaye is being seen today for an annual adult preventative physical exam.  and Tesfaye is also being seen today for follow-up on his chronic health conditions:    Hypertension.  The patient takes lisinopril 10 mg daily and metoprolol 25 mg 1/2 tab daily.  He states that his blood pressure is under good control now at home.     Afib.  Patient is currently taking 5 mg of Eliquis twice daily.  He is also taking metoprolol 25 mg 1/2 tab daily.  Patient denies any  "palpitations.    Hyperlipidemia.  ASCVD risk was 12.4% on lipids from March 2020.  He is taking atorvastatin 10 mg daily.  The patient has not noticed any side effects of the medication.  He has been taking it every day as directed.  Cholesterol was at goal.      GERD.  The patient takes Nexium 40 mg daily.  He cannot remember the last occurrence of heartburn.  He states that he does not have any side effects of that medicine.  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  He is also followed by a GI specialist.    Arthritis.  The patient states that he suffers from pain in his knees and hips intermittently.  He no longer takes meloxicam 15 mg daily due to the Eliquis.  Uses tylenol in place if needed.            Objective   Vital Signs:  /64   Pulse 62   Temp 98.3 °F (36.8 °C)   Ht 182.9 cm (72\")   Wt 84.3 kg (185 lb 12.8 oz)   SpO2 98%   BMI 25.20 kg/m²   Physical Exam  Vitals and nursing note reviewed.   Constitutional:       Appearance: Normal appearance. He is well-developed and normal weight.   HENT:      Head: Normocephalic and atraumatic.   Eyes:      General: No scleral icterus.     Conjunctiva/sclera: Conjunctivae normal.   Cardiovascular:      Rate and Rhythm: Normal rate and regular rhythm.      Heart sounds: Normal heart sounds.   Pulmonary:      Effort: Pulmonary effort is normal.      Breath sounds: Normal breath sounds.   Musculoskeletal:         General: Normal range of motion.      Cervical back: Normal range of motion and neck supple.      Right lower leg: No edema.      Left lower leg: No edema.   Skin:     General: Skin is warm and dry.      Capillary Refill: Capillary refill takes less than 2 seconds.      Findings: No rash.   Neurological:      Mental Status: He is alert and oriented to person, place, and time.   Psychiatric:         Mood and Affect: Mood normal.         Behavior: Behavior normal.         Thought Content: Thought content " normal.         Judgment: Judgment normal.         The following data was reviewed by: Gina Hung DO on 09/10/2024:        Assessment and Plan Additional age appropriate preventative wellness advice topics were discussed during today's preventative wellness exam(some topics already addressed during AWV portion of the note above):    Physical Activity: Advised cardiovascular activity 150 minutes per week as tolerated. (example brisk walk for 30 minutes, 5 days a week).     Nutrition: Discussed nutrition plan with patient. Information shared in after visit summary. Goal is for a well balanced diet to enhance overall health.     Healthy Weight: Discussed current and goal BMI with patient. Steps to attain this goal discussed. Information shared in after visit summary.              Medicare annual wellness visit, subsequent    Essential hypertension    Mixed hyperlipidemia     Gastroesophageal reflux disease, unspecified whether esophagitis present    PAF (paroxysmal atrial fibrillation)    Elevated serum creatinine    Encounter for long-term (current) use of medications      Orders Placed This Encounter   Procedures    Lipid Panel     Order Specific Question:   Release to patient     Answer:   Routine Release [[id number]]    Comprehensive Metabolic Panel     Order Specific Question:   Release to patient     Answer:   Routine Release [[id number]]    TSH     Order Specific Question:   Release to patient     Answer:   Routine Release [[id number]]    CBC & Differential     Order Specific Question:   Manual Differential     Answer:   No     Order Specific Question:   Release to patient     Answer:   Routine Release [[id number]]     RHM.  Patient has a GI doctor and will check on due date of next colonoscopy/endoscopy.  PSA screen was good 6 months ago.  Vaccine recommendations discussed.    Patient is also here to follow-up on chronic stable hypertension, hyperlipidemia, GERD, PAF, elevated creatinine.  Surveillance  labs were obtained today and any medication changes will be made based on lab results and will be called to the patient later this week.             Follow Up   Return in about 6 months (around 3/10/2025) for HTN.  Patient was given instructions and counseling regarding his condition or for health maintenance advice. Please see specific information pulled into the AVS if appropriate.

## 2024-09-11 LAB
ALBUMIN SERPL-MCNC: 4.4 G/DL (ref 3.5–5.2)
ALBUMIN/GLOB SERPL: 1.9 G/DL
ALP SERPL-CCNC: 76 U/L (ref 39–117)
ALT SERPL-CCNC: 21 U/L (ref 1–41)
AST SERPL-CCNC: 21 U/L (ref 1–40)
BASOPHILS # BLD AUTO: 0.05 10*3/MM3 (ref 0–0.2)
BASOPHILS NFR BLD AUTO: 0.7 % (ref 0–1.5)
BILIRUB SERPL-MCNC: 0.6 MG/DL (ref 0–1.2)
BUN SERPL-MCNC: 19 MG/DL (ref 8–23)
BUN/CREAT SERPL: 14.5 (ref 7–25)
CALCIUM SERPL-MCNC: 9.4 MG/DL (ref 8.6–10.5)
CHLORIDE SERPL-SCNC: 101 MMOL/L (ref 98–107)
CHOLEST SERPL-MCNC: 159 MG/DL (ref 0–200)
CO2 SERPL-SCNC: 28.7 MMOL/L (ref 22–29)
CREAT SERPL-MCNC: 1.31 MG/DL (ref 0.76–1.27)
EGFRCR SERPLBLD CKD-EPI 2021: 58.2 ML/MIN/1.73
EOSINOPHIL # BLD AUTO: 0.07 10*3/MM3 (ref 0–0.4)
EOSINOPHIL NFR BLD AUTO: 0.9 % (ref 0.3–6.2)
ERYTHROCYTE [DISTWIDTH] IN BLOOD BY AUTOMATED COUNT: 13.8 % (ref 12.3–15.4)
GLOBULIN SER CALC-MCNC: 2.3 GM/DL
GLUCOSE SERPL-MCNC: 86 MG/DL (ref 65–99)
HCT VFR BLD AUTO: 46.2 % (ref 37.5–51)
HDLC SERPL-MCNC: 84 MG/DL (ref 40–60)
HGB BLD-MCNC: 15.5 G/DL (ref 13–17.7)
IMM GRANULOCYTES # BLD AUTO: 0.02 10*3/MM3 (ref 0–0.05)
IMM GRANULOCYTES NFR BLD AUTO: 0.3 % (ref 0–0.5)
LDLC SERPL CALC-MCNC: 64 MG/DL (ref 0–100)
LYMPHOCYTES # BLD AUTO: 1.52 10*3/MM3 (ref 0.7–3.1)
LYMPHOCYTES NFR BLD AUTO: 20.5 % (ref 19.6–45.3)
MCH RBC QN AUTO: 31.5 PG (ref 26.6–33)
MCHC RBC AUTO-ENTMCNC: 33.5 G/DL (ref 31.5–35.7)
MCV RBC AUTO: 93.9 FL (ref 79–97)
MONOCYTES # BLD AUTO: 0.7 10*3/MM3 (ref 0.1–0.9)
MONOCYTES NFR BLD AUTO: 9.4 % (ref 5–12)
NEUTROPHILS # BLD AUTO: 5.07 10*3/MM3 (ref 1.7–7)
NEUTROPHILS NFR BLD AUTO: 68.2 % (ref 42.7–76)
NRBC BLD AUTO-RTO: 0 /100 WBC (ref 0–0.2)
PLATELET # BLD AUTO: 219 10*3/MM3 (ref 140–450)
POTASSIUM SERPL-SCNC: 3.9 MMOL/L (ref 3.5–5.2)
PROT SERPL-MCNC: 6.7 G/DL (ref 6–8.5)
RBC # BLD AUTO: 4.92 10*6/MM3 (ref 4.14–5.8)
SODIUM SERPL-SCNC: 140 MMOL/L (ref 136–145)
TRIGL SERPL-MCNC: 54 MG/DL (ref 0–150)
TSH SERPL DL<=0.005 MIU/L-ACNC: 2.16 UIU/ML (ref 0.27–4.2)
VLDLC SERPL CALC-MCNC: 11 MG/DL (ref 5–40)
WBC # BLD AUTO: 7.43 10*3/MM3 (ref 3.4–10.8)

## 2024-10-15 RX ORDER — ATORVASTATIN CALCIUM 10 MG/1
10 TABLET, FILM COATED ORAL DAILY
Qty: 90 TABLET | Refills: 3 | Status: SHIPPED | OUTPATIENT
Start: 2024-10-15

## 2024-10-21 ENCOUNTER — TELEPHONE (OUTPATIENT)
Dept: CARDIOLOGY | Facility: CLINIC | Age: 69
End: 2024-10-21

## 2024-10-21 ENCOUNTER — OFFICE VISIT (OUTPATIENT)
Dept: CARDIOLOGY | Facility: CLINIC | Age: 69
End: 2024-10-21
Payer: MEDICARE

## 2024-10-21 ENCOUNTER — LAB (OUTPATIENT)
Dept: LAB | Facility: HOSPITAL | Age: 69
End: 2024-10-21
Payer: MEDICARE

## 2024-10-21 VITALS
SYSTOLIC BLOOD PRESSURE: 130 MMHG | OXYGEN SATURATION: 98 % | DIASTOLIC BLOOD PRESSURE: 70 MMHG | BODY MASS INDEX: 25.53 KG/M2 | HEIGHT: 72 IN | WEIGHT: 188.5 LBS | HEART RATE: 44 BPM

## 2024-10-21 DIAGNOSIS — I48.0 PAF (PAROXYSMAL ATRIAL FIBRILLATION): Primary | ICD-10-CM

## 2024-10-21 DIAGNOSIS — N17.9 AKI (ACUTE KIDNEY INJURY): ICD-10-CM

## 2024-10-21 DIAGNOSIS — N17.9 AKI (ACUTE KIDNEY INJURY): Primary | ICD-10-CM

## 2024-10-21 DIAGNOSIS — I49.3 PVC (PREMATURE VENTRICULAR CONTRACTION): ICD-10-CM

## 2024-10-21 LAB
ANION GAP SERPL CALCULATED.3IONS-SCNC: 11.3 MMOL/L (ref 5–15)
BUN SERPL-MCNC: 20 MG/DL (ref 8–23)
BUN/CREAT SERPL: 15.2 (ref 7–25)
CALCIUM SPEC-SCNC: 9.5 MG/DL (ref 8.6–10.5)
CHLORIDE SERPL-SCNC: 101 MMOL/L (ref 98–107)
CO2 SERPL-SCNC: 29.7 MMOL/L (ref 22–29)
CREAT SERPL-MCNC: 1.32 MG/DL (ref 0.76–1.27)
EGFRCR SERPLBLD CKD-EPI 2021: 57.7 ML/MIN/1.73
GLUCOSE SERPL-MCNC: 90 MG/DL (ref 65–99)
POTASSIUM SERPL-SCNC: 4.7 MMOL/L (ref 3.5–5.2)
SODIUM SERPL-SCNC: 142 MMOL/L (ref 136–145)

## 2024-10-21 PROCEDURE — 80048 BASIC METABOLIC PNL TOTAL CA: CPT

## 2024-10-21 PROCEDURE — 36415 COLL VENOUS BLD VENIPUNCTURE: CPT

## 2024-10-21 PROCEDURE — 3078F DIAST BP <80 MM HG: CPT | Performed by: NURSE PRACTITIONER

## 2024-10-21 PROCEDURE — 99214 OFFICE O/P EST MOD 30 MIN: CPT | Performed by: NURSE PRACTITIONER

## 2024-10-21 PROCEDURE — 93000 ELECTROCARDIOGRAM COMPLETE: CPT | Performed by: NURSE PRACTITIONER

## 2024-10-21 PROCEDURE — 1159F MED LIST DOCD IN RCRD: CPT | Performed by: NURSE PRACTITIONER

## 2024-10-21 PROCEDURE — 1160F RVW MEDS BY RX/DR IN RCRD: CPT | Performed by: NURSE PRACTITIONER

## 2024-10-21 PROCEDURE — 3075F SYST BP GE 130 - 139MM HG: CPT | Performed by: NURSE PRACTITIONER

## 2024-10-21 RX ORDER — FUROSEMIDE 40 MG
20 TABLET ORAL DAILY
Qty: 90 TABLET | Refills: 3 | Status: SHIPPED | OUTPATIENT
Start: 2024-10-21

## 2024-10-21 RX ORDER — POTASSIUM CHLORIDE 750 MG/1
TABLET, EXTENDED RELEASE ORAL
COMMUNITY
Start: 2024-09-10 | End: 2024-10-21

## 2024-10-21 NOTE — PROGRESS NOTES
Date of Office Visit: 10/21/24  Encounter Provider: NERY Stanton  Place of Service: [facility] CARDIOLOGY  Patient Name: Tesfaye Smith  :1955    Chief Complaint   Patient presents with    Follow-up    Atrial Fibrillation   :     HPI: Tesfaye Smith is a 69 y.o. male  with atrial fibrillation, LV dysfunction, cardiomyopathy, hypertension, hyperlipidemia,arthritis,  azar's esophagus and GERD.         He is followed by Dr. Renato Stern. I will visit with him in follow up today and have reviewed his medical record.      He was found to have new onset atrial fibrillation in May 2022. Echocardiogram showed moderately decreased left ventricular systolic function with an EF of approximately 34%, borderline dilated left ventricular cavity, mild to moderately dilated right ventricular cavity, mildly reduced right ventricular systolic function and mild to moderate mitral regurgitation.  He was started on metoprolol and Eliquis.  He continued to have symptomatic atrial fibrillation and had a ERIC/cardioversion on 2022.  There was no evidence of intracardiac thrombus or mass so he was successfully cardioverted to an ectopic atrial rhythm with PACs and PVCs. amio was decreased. His HR was running 53-65 so metoprolol was decreased to 25 mg twice daily and he had improved energy.   Follow-up echo 2022 showed preserved left ventricular systolic function with mildly dilated left ventricular cavity.        He had recurrent atrial fibrillation with elevated heart rate and metoprolol was increased from 12.5 mg daily to 25 mg daily..  On 2024 he was loaded with amiodarone 200 mg twice daily x 10 days and had cardioversion 2024.  He was then maintained on amiodarone 200 mg daily for 30 days.      He presents today for reassessment.  He had no episodes of atrial fibrillation.  He has been off amiodarone for several weeks.  He brought a list of his blood pressure and  "pulse readings.  His pulse is typically 50-60s at home.  No near-syncope or syncope.          No Known Allergies        Family and social history reviewed.     ROS  All other systems were reviewed and are negative          Objective:     Vitals:    10/21/24 1140   BP: 130/70   BP Location: Left arm   Patient Position: Sitting   Cuff Size: Adult   Pulse: (!) 44   SpO2: 98%   Weight: 85.5 kg (188 lb 8 oz)   Height: 182.9 cm (72\")     Body mass index is 25.57 kg/m².    PHYSICAL EXAM:  Pulmonary:      Effort: Pulmonary effort is normal.      Breath sounds: Normal breath sounds.   Cardiovascular:      Bradycardia present. Regular rhythm.           ECG 12 Lead    Date/Time: 10/21/2024 11:44 AM  Performed by: Zuly Meek APRN    Authorized by: Zuly Meek APRN  Comparison: compared with previous ECG   Similar to previous ECG  Rhythm: sinus bradycardia  Rate: bradycardic  BPM: 44  Comments: Qtc ok            Current Outpatient Medications   Medication Sig Dispense Refill    atorvastatin (LIPITOR) 10 MG tablet TAKE 1 TABLET BY MOUTH DAILY 90 tablet 3    Eliquis 5 MG tablet tablet TAKE 1 TABLET BY MOUTH TWICE  DAILY 180 tablet 3    esomeprazole (nexIUM) 40 MG capsule Take 1 capsule by mouth Every Morning Before Breakfast. 90 capsule 3    furosemide (LASIX) 40 MG tablet Take 0.5 tablets by mouth Daily. 90 tablet 3    lisinopril (PRINIVIL,ZESTRIL) 10 MG tablet Take 1 tablet by mouth Daily. 90 tablet 3    metoprolol succinate XL (TOPROL-XL) 25 MG 24 hr tablet Take 1 tablet by mouth Daily. 90 tablet 1    Multiple Vitamins-Minerals (CENTRUM SILVER ADULT 50+) tablet Take 1 tablet by mouth Daily.       No current facility-administered medications for this visit.     Assessment:       Diagnosis Plan   1. PAF (paroxysmal atrial fibrillation)  ECG 12 Lead      2. PVC (premature ventricular contraction)        3. GALE (acute kidney injury)  Basic Metabolic Panel           Orders Placed This Encounter   Procedures    Basic Metabolic " Panel     Standing Status:   Future     Standing Expiration Date:   10/21/2025     Order Specific Question:   Release to patient     Answer:   Routine Release [[id number]]    ECG 12 Lead     This order was created via procedure documentation     Order Specific Question:   Release to patient     Answer:   Routine Release [[id number]]         Plan:        1. 69 year old gentleman with persistent a fib s/p cardioversion 5/31/2022.  He then had recurrent atrial fibrillation with repeat cardioversion while on amiodarone 8/21/2024.  He is no longer on amiodarone 200 mg daily and appears to be maintaining normal sinus rhythm.  He will continue on Eliquis 5 mg twice daily and metoprolol succinate 25 mg daily.    2.  Tachycardia/arrhythmia mediated cardiomyopathy with EF 37% in may 2022 and ejection fraction normalized by September 2022  He will continue 25 mg daily metoprolol succinate, lisinopril and we will try lower Lasix from 40 mg to 20 mg daily.  At this time he will stop potassium replacement.  3. Hypertension blood pressure appears well controlled overall.   4. Hyperlipidemia continue atorvastatin 10 mg daily  5. Arthritis  6. GERD/ Almaraz's esophagus   7. Spider veins BLE- no pitting edema today.  We discussed monitoring sodium and elevating legs after a long day of standing or sitting.  He verbalized understanding  8.  Essential tremor upper extremities  9.  Mild renal insufficiency-he is increase his water intake over the last 5 weeks and I will recheck BMP today to reassess serum creatinine    Keep appointment in March to follow-up and call sooner with any issues.        It has been a pleasure to participate in this patient's care.      Thank you,  NERY Stanton      **I used Dragon to dictate this note:**

## 2024-10-21 NOTE — TELEPHONE ENCOUNTER
S/w patient. sCr 1.32, GFR slightly low and CO2 little high.  We will decrease furosemide from 40 mg to 20 mg daily and stop potassium.  We will recheck BMP in 1 month.  Patient verbalized understanding of plan.  He was appreciative of my call.

## 2024-11-19 ENCOUNTER — TELEPHONE (OUTPATIENT)
Dept: CARDIOLOGY | Facility: CLINIC | Age: 69
End: 2024-11-19
Payer: MEDICARE

## 2024-11-19 NOTE — TELEPHONE ENCOUNTER
S/w patient. He is on z pack and BP is a little higher. He has no increased swelling on lower dose lasix. SBP averages 145/80s. He will continue checking blood pressure once he is over his acute illness and if needed we will increase lisinopril and arrange for follow-up BMP.  He verbalized understanding.

## 2024-11-25 ENCOUNTER — PATIENT MESSAGE (OUTPATIENT)
Dept: CARDIOLOGY | Facility: CLINIC | Age: 69
End: 2024-11-25
Payer: MEDICARE

## 2024-11-26 DIAGNOSIS — Z51.81 ENCOUNTER FOR THERAPEUTIC DRUG LEVEL MONITORING: ICD-10-CM

## 2024-11-26 DIAGNOSIS — I10 PRIMARY HYPERTENSION: Primary | ICD-10-CM

## 2024-11-26 RX ORDER — LISINOPRIL 20 MG/1
20 TABLET ORAL DAILY
Qty: 90 TABLET | Refills: 1 | Status: SHIPPED | OUTPATIENT
Start: 2024-11-26

## 2024-12-03 ENCOUNTER — PATIENT MESSAGE (OUTPATIENT)
Dept: CARDIOLOGY | Facility: CLINIC | Age: 69
End: 2024-12-03
Payer: MEDICARE

## 2024-12-05 ENCOUNTER — LAB (OUTPATIENT)
Dept: LAB | Facility: HOSPITAL | Age: 69
End: 2024-12-05
Payer: MEDICARE

## 2024-12-05 ENCOUNTER — TELEPHONE (OUTPATIENT)
Dept: CARDIOLOGY | Facility: CLINIC | Age: 69
End: 2024-12-05
Payer: MEDICARE

## 2024-12-05 DIAGNOSIS — Z51.81 ENCOUNTER FOR THERAPEUTIC DRUG LEVEL MONITORING: ICD-10-CM

## 2024-12-05 DIAGNOSIS — I10 PRIMARY HYPERTENSION: ICD-10-CM

## 2024-12-05 LAB
ANION GAP SERPL CALCULATED.3IONS-SCNC: 9 MMOL/L (ref 5–15)
BUN SERPL-MCNC: 14 MG/DL (ref 8–23)
BUN/CREAT SERPL: 13 (ref 7–25)
CALCIUM SPEC-SCNC: 9.4 MG/DL (ref 8.6–10.5)
CHLORIDE SERPL-SCNC: 99 MMOL/L (ref 98–107)
CO2 SERPL-SCNC: 29 MMOL/L (ref 22–29)
CREAT SERPL-MCNC: 1.08 MG/DL (ref 0.76–1.27)
EGFRCR SERPLBLD CKD-EPI 2021: 73.4 ML/MIN/1.73
GLUCOSE SERPL-MCNC: 84 MG/DL (ref 65–99)
POTASSIUM SERPL-SCNC: 3.8 MMOL/L (ref 3.5–5.2)
SODIUM SERPL-SCNC: 137 MMOL/L (ref 136–145)

## 2024-12-05 PROCEDURE — 80048 BASIC METABOLIC PNL TOTAL CA: CPT

## 2024-12-05 PROCEDURE — 36415 COLL VENOUS BLD VENIPUNCTURE: CPT

## 2024-12-05 RX ORDER — LISINOPRIL 20 MG/1
40 TABLET ORAL DAILY
Start: 2024-12-05

## 2024-12-12 ENCOUNTER — PATIENT MESSAGE (OUTPATIENT)
Dept: CARDIOLOGY | Facility: CLINIC | Age: 69
End: 2024-12-12
Payer: MEDICARE

## 2024-12-12 RX ORDER — AMLODIPINE BESYLATE 5 MG/1
5 TABLET ORAL DAILY
Qty: 30 TABLET | Refills: 3 | Status: SHIPPED | OUTPATIENT
Start: 2024-12-12

## 2025-01-02 ENCOUNTER — OFFICE (AMBULATORY)
Dept: URBAN - METROPOLITAN AREA CLINIC 76 | Facility: CLINIC | Age: 70
End: 2025-01-02
Payer: MEDICARE

## 2025-01-02 VITALS
HEART RATE: 72 BPM | DIASTOLIC BLOOD PRESSURE: 90 MMHG | OXYGEN SATURATION: 97 % | WEIGHT: 191 LBS | SYSTOLIC BLOOD PRESSURE: 128 MMHG | HEIGHT: 72 IN

## 2025-01-02 DIAGNOSIS — K21.9 GASTRO-ESOPHAGEAL REFLUX DISEASE WITHOUT ESOPHAGITIS: ICD-10-CM

## 2025-01-02 DIAGNOSIS — Z86.0100 PERSONAL HISTORY OF COLON POLYPS, UNSPECIFIED: ICD-10-CM

## 2025-01-02 DIAGNOSIS — Z80.0 FAMILY HISTORY OF MALIGNANT NEOPLASM OF DIGESTIVE ORGANS: ICD-10-CM

## 2025-01-02 DIAGNOSIS — K22.70 BARRETT'S ESOPHAGUS WITHOUT DYSPLASIA: ICD-10-CM

## 2025-01-02 DIAGNOSIS — K57.30 DIVERTICULOSIS OF LARGE INTESTINE WITHOUT PERFORATION OR ABS: ICD-10-CM

## 2025-01-02 PROBLEM — Z86.010 SURVEILLANCE DUE TO PRIOR COLONIC NEOPLASIA: Status: ACTIVE | Noted: 2019-10-07

## 2025-01-02 PROCEDURE — 99213 OFFICE O/P EST LOW 20 MIN: CPT

## 2025-01-03 ENCOUNTER — TELEPHONE (OUTPATIENT)
Dept: CARDIOLOGY | Facility: CLINIC | Age: 70
End: 2025-01-03
Payer: MEDICARE

## 2025-01-03 NOTE — TELEPHONE ENCOUNTER
Patient is at acceptable cardiovascular risk to proceed with endoscopy.  He may hold Eliquis 2 days prior and resume as soon as possible postprocedure.        NERY Stanton  [facility]   [address]  [place] [address]  Ph: [phone]  Fax: [phone]

## 2025-01-14 ENCOUNTER — PATIENT MESSAGE (OUTPATIENT)
Dept: CARDIOLOGY | Facility: CLINIC | Age: 70
End: 2025-01-14
Payer: MEDICARE

## 2025-01-23 ENCOUNTER — OFFICE (AMBULATORY)
Dept: URBAN - METROPOLITAN AREA PATHOLOGY 4 | Facility: PATHOLOGY | Age: 70
End: 2025-01-23
Payer: MEDICARE

## 2025-01-23 DIAGNOSIS — D12.2 BENIGN NEOPLASM OF ASCENDING COLON: ICD-10-CM

## 2025-01-23 DIAGNOSIS — K31.89 OTHER DISEASES OF STOMACH AND DUODENUM: ICD-10-CM

## 2025-01-23 DIAGNOSIS — K22.70 BARRETT'S ESOPHAGUS WITHOUT DYSPLASIA: ICD-10-CM

## 2025-01-23 PROBLEM — K63.5 POLYP OF COLON: Status: ACTIVE | Noted: 2025-01-23

## 2025-01-23 PROBLEM — K57.30 DIVERTICULOSIS OF LARGE INTESTINE WITHOUT PERFORATION OR ABS: Status: ACTIVE | Noted: 2025-01-23

## 2025-01-23 PROCEDURE — 88342 IMHCHEM/IMCYTCHM 1ST ANTB: CPT | Performed by: PATHOLOGY

## 2025-01-23 PROCEDURE — 88305 TISSUE EXAM BY PATHOLOGIST: CPT | Performed by: PATHOLOGY

## 2025-01-27 RX ORDER — METOPROLOL SUCCINATE 25 MG/1
25 TABLET, EXTENDED RELEASE ORAL DAILY
Qty: 90 TABLET | Refills: 0 | Status: SHIPPED | OUTPATIENT
Start: 2025-01-27

## 2025-01-27 NOTE — TELEPHONE ENCOUNTER
Nov--03/14/25-[facility]  LOV-10/21/24-[place]    Plan:        1. 69 year old gentleman with persistent a fib s/p cardioversion 5/31/2022.  He then had recurrent atrial fibrillation with repeat cardioversion while on amiodarone 8/21/2024.  He is no longer on amiodarone 200 mg daily and appears to be maintaining normal sinus rhythm.  He will continue on Eliquis 5 mg twice daily and metoprolol succinate 25 mg daily.    2.  Tachycardia/arrhythmia mediated cardiomyopathy with EF 37% in may 2022 and ejection fraction normalized by September 2022  He will continue 25 mg daily metoprolol succinate, lisinopril and we will try lower Lasix from 40 mg to 20 mg daily.  At this time he will stop potassium replacement.  3. Hypertension blood pressure appears well controlled overall.   4. Hyperlipidemia continue atorvastatin 10 mg daily  5. Arthritis  6. GERD/ Almaraz's esophagus   7. Spider veins BLE- no pitting edema today.  We discussed monitoring sodium and elevating legs after a long day of standing or sitting.  He verbalized understanding  8.  Essential tremor upper extremities  9.  Mild renal insufficiency-he is increase his water intake over the last 5 weeks and I will recheck BMP today to reassess serum creatinine     Keep appointment in March to follow-up and call sooner with any issues.

## 2025-03-10 ENCOUNTER — OFFICE VISIT (OUTPATIENT)
Dept: FAMILY MEDICINE CLINIC | Facility: CLINIC | Age: 70
End: 2025-03-10
Payer: MEDICARE

## 2025-03-10 VITALS
DIASTOLIC BLOOD PRESSURE: 86 MMHG | BODY MASS INDEX: 26.28 KG/M2 | SYSTOLIC BLOOD PRESSURE: 136 MMHG | HEIGHT: 72 IN | OXYGEN SATURATION: 98 % | HEART RATE: 62 BPM | WEIGHT: 194 LBS

## 2025-03-10 DIAGNOSIS — Z79.899 ENCOUNTER FOR LONG-TERM (CURRENT) USE OF MEDICATIONS: ICD-10-CM

## 2025-03-10 DIAGNOSIS — E78.2 MIXED HYPERLIPIDEMIA: ICD-10-CM

## 2025-03-10 DIAGNOSIS — I10 ESSENTIAL HYPERTENSION: Primary | ICD-10-CM

## 2025-03-10 DIAGNOSIS — K21.9 GASTROESOPHAGEAL REFLUX DISEASE, UNSPECIFIED WHETHER ESOPHAGITIS PRESENT: ICD-10-CM

## 2025-03-10 DIAGNOSIS — I48.0 PAF (PAROXYSMAL ATRIAL FIBRILLATION): ICD-10-CM

## 2025-03-10 PROCEDURE — 1159F MED LIST DOCD IN RCRD: CPT | Performed by: FAMILY MEDICINE

## 2025-03-10 PROCEDURE — 3079F DIAST BP 80-89 MM HG: CPT | Performed by: FAMILY MEDICINE

## 2025-03-10 PROCEDURE — 1160F RVW MEDS BY RX/DR IN RCRD: CPT | Performed by: FAMILY MEDICINE

## 2025-03-10 PROCEDURE — 1125F AMNT PAIN NOTED PAIN PRSNT: CPT | Performed by: FAMILY MEDICINE

## 2025-03-10 PROCEDURE — 99214 OFFICE O/P EST MOD 30 MIN: CPT | Performed by: FAMILY MEDICINE

## 2025-03-10 PROCEDURE — 3075F SYST BP GE 130 - 139MM HG: CPT | Performed by: FAMILY MEDICINE

## 2025-03-10 RX ORDER — FUROSEMIDE 20 MG/1
20 TABLET ORAL DAILY
Qty: 90 TABLET | Refills: 0 | Status: SHIPPED | OUTPATIENT
Start: 2025-03-10

## 2025-03-10 NOTE — PROGRESS NOTES
"Chief Complaint  Hypertension    Subjective        Tesfaye Smith presents to [facility]  History of Present Illness  Patient is here today for follow-up on his chronic health conditions:    Hypertension.  The patient takes lisinopril 40 mg daily, metoprolol XL 25 mg daily, and amlodipine 5 mg daily.  And he also takes Lasix 20 mg daily.  He is not currently taking potassium at this time.  (His medications were changed around some since his last appointment with me by cardiology.) he states that his blood pressure is under good control now at home.     Afib.  Patient is currently taking 5 mg of Eliquis twice daily.  He is also taking metoprolol 25 mg tab daily.  Patient denies any palpitations.    Hyperlipidemia.  ASCVD risk was 12.4% on lipids from March 2020.  He is taking atorvastatin 10 mg daily.  The patient has not noticed any side effects of the medication.  He has been taking it every day as directed.       GERD.  The patient takes Nexium 40 mg daily.  He cannot remember the last occurrence of heartburn.  He states that he does not have any side effects of that medicine.  He has a history of Almaraz's esophagitis and his last biopsy was 1/2025.  He is also followed by a GI specialist      Objective   Vital Signs:  /86   Pulse 62   Ht 182.9 cm (72\")   Wt 88 kg (194 lb)   SpO2 98%   BMI 26.31 kg/m²   Estimated body mass index is 26.31 kg/m² as calculated from the following:    Height as of this encounter: 182.9 cm (72\").    Weight as of this encounter: 88 kg (194 lb).        Physical Exam  Vitals and nursing note reviewed.   Constitutional:       Appearance: Normal appearance. He is well-developed.   HENT:      Head: Normocephalic and atraumatic.   Eyes:      General: No scleral icterus.     Conjunctiva/sclera: Conjunctivae normal.   Cardiovascular:      Rate and Rhythm: Normal rate and regular rhythm.      Heart sounds: Normal heart sounds.   Pulmonary:      Effort: " Pulmonary effort is normal.      Breath sounds: Normal breath sounds.   Musculoskeletal:         General: Normal range of motion.      Cervical back: Normal range of motion and neck supple.      Right lower leg: No edema.      Left lower leg: No edema.   Skin:     General: Skin is warm and dry.      Capillary Refill: Capillary refill takes less than 2 seconds.      Findings: No rash.   Neurological:      Mental Status: He is alert and oriented to person, place, and time.   Psychiatric:         Mood and Affect: Mood normal.         Behavior: Behavior normal.         Thought Content: Thought content normal.         Judgment: Judgment normal.        Result Review :  The following data was reviewed by: Gina Hung DO on 03/10/2025:  Common labs          9/10/2024    11:33 10/21/2024    12:40 12/5/2024    12:30   Common Labs   Glucose 86  90  84    BUN 19  20  14    Creatinine 1.31  1.32  1.08    Sodium 140  142  137    Potassium 3.9  4.7  3.8    Chloride 101  101  99    Calcium 9.4  9.5  9.4    Albumin 4.4      Total Bilirubin 0.6      Alkaline Phosphatase 76      AST (SGOT) 21      ALT (SGPT) 21      WBC 7.43      Hemoglobin 15.5      Hematocrit 46.2      Platelets 219      Total Cholesterol 159      Triglycerides 54      HDL Cholesterol 84      LDL Cholesterol  64        TSH          9/10/2024    11:33   TSH   TSH 2.160                Assessment and Plan   Diagnoses and all orders for this visit:    1. Essential hypertension (Primary)  -     Comprehensive Metabolic Panel    2. Mixed hyperlipidemia    3. PAF (paroxysmal atrial fibrillation)    4. Gastroesophageal reflux disease, unspecified whether esophagitis present    5. Encounter for long-term (current) use of medications  -     Comprehensive Metabolic Panel    Other orders  -     furosemide (LASIX) 20 MG tablet; Take 1 tablet by mouth Daily.  Dispense: 90 tablet; Refill: 0    Patient is here today for chronic stable hypertension, hyperlipidemia, A-fib, and  GERD.  Surveillance labs were obtained today and any medication changes will be made based on lab results and will be called to the patient later this week.         Follow Up   Return in about 6 months (around 9/11/2025) for Medicare Wellness, HTN.  Patient was given instructions and counseling regarding his condition or for health maintenance advice. Please see specific information pulled into the AVS if appropriate.

## 2025-03-11 LAB
ALBUMIN SERPL-MCNC: 4.3 G/DL (ref 3.8–4.8)
ALP SERPL-CCNC: 88 IU/L (ref 44–121)
ALT SERPL-CCNC: 17 IU/L (ref 0–44)
AST SERPL-CCNC: 18 IU/L (ref 0–40)
BILIRUB SERPL-MCNC: 0.6 MG/DL (ref 0–1.2)
BUN SERPL-MCNC: 20 MG/DL (ref 8–27)
BUN/CREAT SERPL: 18 (ref 10–24)
CALCIUM SERPL-MCNC: 9.3 MG/DL (ref 8.6–10.2)
CHLORIDE SERPL-SCNC: 103 MMOL/L (ref 96–106)
CO2 SERPL-SCNC: 24 MMOL/L (ref 20–29)
CREAT SERPL-MCNC: 1.13 MG/DL (ref 0.76–1.27)
EGFRCR SERPLBLD CKD-EPI 2021: 69 ML/MIN/1.73
GLOBULIN SER CALC-MCNC: 2.4 G/DL (ref 1.5–4.5)
GLUCOSE SERPL-MCNC: 76 MG/DL (ref 70–99)
POTASSIUM SERPL-SCNC: 4.4 MMOL/L (ref 3.5–5.2)
PROT SERPL-MCNC: 6.7 G/DL (ref 6–8.5)
SODIUM SERPL-SCNC: 141 MMOL/L (ref 134–144)

## 2025-03-21 ENCOUNTER — OFFICE VISIT (OUTPATIENT)
Dept: CARDIOLOGY | Facility: CLINIC | Age: 70
End: 2025-03-21
Payer: MEDICARE

## 2025-03-21 VITALS
BODY MASS INDEX: 26.14 KG/M2 | RESPIRATION RATE: 16 BRPM | SYSTOLIC BLOOD PRESSURE: 126 MMHG | HEART RATE: 50 BPM | HEIGHT: 72 IN | DIASTOLIC BLOOD PRESSURE: 72 MMHG | WEIGHT: 193 LBS | OXYGEN SATURATION: 96 %

## 2025-03-21 DIAGNOSIS — I48.0 PAF (PAROXYSMAL ATRIAL FIBRILLATION): Primary | ICD-10-CM

## 2025-03-21 DIAGNOSIS — R94.31 ABNORMAL EKG: ICD-10-CM

## 2025-03-21 DIAGNOSIS — I49.3 PVC (PREMATURE VENTRICULAR CONTRACTION): ICD-10-CM

## 2025-03-21 DIAGNOSIS — I10 PRIMARY HYPERTENSION: ICD-10-CM

## 2025-03-21 PROCEDURE — 1160F RVW MEDS BY RX/DR IN RCRD: CPT | Performed by: INTERNAL MEDICINE

## 2025-03-21 PROCEDURE — 99214 OFFICE O/P EST MOD 30 MIN: CPT | Performed by: INTERNAL MEDICINE

## 2025-03-21 PROCEDURE — 1159F MED LIST DOCD IN RCRD: CPT | Performed by: INTERNAL MEDICINE

## 2025-03-21 PROCEDURE — 3074F SYST BP LT 130 MM HG: CPT | Performed by: INTERNAL MEDICINE

## 2025-03-21 PROCEDURE — 93000 ELECTROCARDIOGRAM COMPLETE: CPT | Performed by: INTERNAL MEDICINE

## 2025-03-21 PROCEDURE — 3078F DIAST BP <80 MM HG: CPT | Performed by: INTERNAL MEDICINE

## 2025-03-21 NOTE — PROGRESS NOTES
[facility]      Patient Name: Tesfaye Smith  :1955  Age: 69 y.o.  Encounter Provider:  Renato Stern Jr, MD      Chief Complaint: Follow-up acute systolic heart failure and atrial fibrillation      HPI  Tesfaye Smith is a 69 y.o. male with past medical history of acute systolic heart failure and atrial fibrillation who presents for routine follow-up.     Last clinic visit note: I originally saw him in the hospital on Memorial Day weekend 2022 where he presented with atrial fibrillation with RVR and LV dysfunction.  He was started on Eliquis and metoprolol and underwent cardioversion with successful restoration of sinus rhythm.  He was seen by Zuly London in hospital follow-up and heart rate was running on the low side so his metoprolol dosing was decreased.  Has been doing extremely well since then.  He has improved activity and is currently cycling 14 miles 2-3 times per week.  The problem he is having is with Eliquis due to financial feasibility.  No bleeding complications.  Heart rate remains on the low side today in clinic.  He denies orthopnea, PND or edema.  No angina.  He did have a stress study with no evidence of myocardial ischemia.    Patient had cardioversion and was on amiodarone for a month in sinus rhythm at short-term follow-up with NERY Meek.  Today EKG is indicative of atrial fibrillation with frequent PVCs.  There are few beats where there looks to be organized atrial activity but of an ectopic source.  Multifocal PVCs are noted.  The patient is asymptomatic.  He has fair functional capacity with no chest pain or shortness of air.  No orthopnea, PND or edema.  Rare palpitations with no dizziness or syncope.  Resting heart rate on EKG 81 bpm.  Last LDL 64.    The following portions of the patient's history were reviewed and updated as appropriate: allergies, current medications, past family history, past medical history, past social history, past surgical  "history and problem list.      Review of Systems   Constitutional: Negative for chills and fever.   HENT:  Negative for hoarse voice and sore throat.    Eyes:  Negative for double vision and photophobia.   Cardiovascular:  Negative for chest pain, leg swelling, near-syncope, orthopnea, palpitations, paroxysmal nocturnal dyspnea and syncope.   Respiratory:  Negative for cough and wheezing.    Skin:  Negative for poor wound healing and rash.   Musculoskeletal:  Negative for arthritis and joint swelling.   Gastrointestinal:  Negative for bloating, abdominal pain, hematemesis and hematochezia.   Neurological:  Negative for dizziness and focal weakness.   Psychiatric/Behavioral:  Negative for depression and suicidal ideas.        OBJECTIVE:   Vital Signs  Vitals:    03/21/25 1056   BP: 126/72   Pulse: 50   Resp: 16   SpO2: 96%     Estimated body mass index is 26.18 kg/m² as calculated from the following:    Height as of this encounter: 182.9 cm (72\").    Weight as of this encounter: 87.5 kg (193 lb).    Vitals reviewed.   Constitutional:       Appearance: Healthy appearance. Not in distress.   Neck:      Vascular: No JVR. JVD normal.   Pulmonary:      Effort: Pulmonary effort is normal.      Breath sounds: Normal breath sounds. No wheezing. No rhonchi. No rales.   Chest:      Chest wall: Not tender to palpatation.   Cardiovascular:      PMI at left midclavicular line. Normal rate. Regular rhythm. Normal S1. Normal S2.       Murmurs: There is no murmur.      No gallop.  No click. No rub.   Pulses:     Intact distal pulses.   Edema:     Peripheral edema absent.   Abdominal:      General: Bowel sounds are normal.      Palpations: Abdomen is soft.      Tenderness: There is no abdominal tenderness.   Musculoskeletal: Normal range of motion.         General: No tenderness. Skin:     General: Skin is warm and dry.   Neurological:      General: No focal deficit present.      Mental Status: Alert and oriented to person, place and " time.           ECG 12 Lead    Date/Time: 3/21/2025 12:17 PM  Performed by: Renato Stern Jr., MD    Authorized by: Renato Stern Jr., MD  Comparison: compared with previous ECG from 10/21/2024  Comparison to previous ECG: Atrial fibrillation replaces sinus rhythm  Rhythm: atrial fibrillation  Ectopy: multifocal PVCs  Other findings: non-specific ST-T wave changes    Clinical impression: abnormal EKG              ASSESSMENT:     Paroxysmal atrial fibrillation  Acute systolic heart failure  Bradycardia    PLAN OF CARE:     Atrial fibrillation -continue metoprolol and Eliquis.  I am going to place a 3-day Holter to evaluate.  If he is back in atrial fibrillation I think we should just continue with rate control strategy.  Follow diagnostic testing results and clinical progress for further treatment recommendations.  Heart failure with improved ejection fraction -recovered ejection fraction on last echocardiogram.  Euvolemic.  Excellent functional capacity.  Continue same.  Bradycardia -no evidence for symptomatic bradycardia or chronotropic incompetence    Return to clinic 6 months             Discharge Medications            Accurate as of March 21, 2025 12:15 PM. If you have any questions, ask your nurse or doctor.                Continue These Medications        Instructions Start Date   amLODIPine 5 MG tablet  Commonly known as: NORVASC   5 mg, Oral, Daily      atorvastatin 10 MG tablet  Commonly known as: LIPITOR   10 mg, Oral, Daily      Centrum Silver Adult 50+ tablet tablet  Generic drug: multivitamin with minerals   1 tablet, Daily      Eliquis 5 MG tablet tablet  Generic drug: apixaban   5 mg, Oral, 2 Times Daily      esomeprazole 40 MG capsule  Commonly known as: nexIUM   40 mg, Oral, Every Morning Before Breakfast      furosemide 20 MG tablet  Commonly known as: LASIX   20 mg, Oral, Daily      lisinopril 20 MG tablet  Commonly known as: PRINIVIL,ZESTRIL   40 mg, Oral, Daily      metoprolol succinate XL  25 MG 24 hr tablet  Commonly known as: TOPROL-XL   25 mg, Oral, Daily               Thank you for allowing me to participate in the care of your patient,      Sincerely,   Renato Stern MD  [facility]  03/21/25  12:15 EDT

## 2025-04-02 ENCOUNTER — TELEPHONE (OUTPATIENT)
Dept: CARDIOLOGY | Age: 70
End: 2025-04-02

## 2025-04-02 NOTE — TELEPHONE ENCOUNTER
Left a message about Holter results.  It appears he is back in sinus rhythm with heart rate controlled.  Continue the same.

## 2025-04-14 RX ORDER — LISINOPRIL 20 MG/1
20 TABLET ORAL DAILY
Qty: 90 TABLET | Refills: 3 | Status: SHIPPED | OUTPATIENT
Start: 2025-04-14

## 2025-04-15 RX ORDER — APIXABAN 5 MG/1
5 TABLET, FILM COATED ORAL 2 TIMES DAILY
Qty: 180 TABLET | Refills: 1 | Status: SHIPPED | OUTPATIENT
Start: 2025-04-15

## 2025-05-27 RX ORDER — FUROSEMIDE 20 MG/1
20 TABLET ORAL DAILY
Qty: 90 TABLET | Refills: 1 | Status: SHIPPED | OUTPATIENT
Start: 2025-05-27

## 2025-05-27 NOTE — TELEPHONE ENCOUNTER
Rx Refill Note  Requested Prescriptions     Pending Prescriptions Disp Refills    furosemide (LASIX) 20 MG tablet [Pharmacy Med Name: Furosemide 20 MG Oral Tablet] 90 tablet 3     Sig: TAKE 1 TABLET BY MOUTH DAILY      Last office visit with prescribing clinician: 3/10/2025   Last telemedicine visit with prescribing clinician: Visit date not found   Next office visit with prescribing clinician: 9/15/2025                         Would you like a call back once the refill request has been completed: [] Yes [] No    If the office needs to give you a call back, can they leave a voicemail: [] Yes [] No    Nicole Og MA  05/27/25, 08:19 EDT

## 2025-06-05 RX ORDER — AMLODIPINE BESYLATE 5 MG/1
5 TABLET ORAL DAILY
Qty: 30 TABLET | Refills: 0 | Status: SHIPPED | OUTPATIENT
Start: 2025-06-05

## 2025-06-24 RX ORDER — LISINOPRIL 40 MG/1
40 TABLET ORAL DAILY
Qty: 90 TABLET | Refills: 3 | Status: SHIPPED | OUTPATIENT
Start: 2025-06-24

## 2025-06-24 RX ORDER — METOPROLOL SUCCINATE 25 MG/1
25 TABLET, EXTENDED RELEASE ORAL DAILY
Qty: 90 TABLET | Refills: 3 | Status: SHIPPED | OUTPATIENT
Start: 2025-06-24

## 2025-06-30 RX ORDER — AMLODIPINE BESYLATE 5 MG/1
5 TABLET ORAL DAILY
Qty: 90 TABLET | Refills: 1 | Status: SHIPPED | OUTPATIENT
Start: 2025-06-30

## (undated) DEVICE — GLV SURG TRIUMPH CLASSIC PF LTX 8 STRL

## (undated) DEVICE — APPL CHLORAPREP W/TINT 26ML ORNG

## (undated) DEVICE — IMPLANTABLE DEVICE
Type: IMPLANTABLE DEVICE | Status: NON-FUNCTIONAL
Removed: 2017-01-24

## (undated) DEVICE — BNDG GZ SOF-FORM CONFRM 2X75IN LF STRL

## (undated) DEVICE — BALL PIN JURGAN .062X3/8 DIA

## (undated) DEVICE — BIT DRL FOR USE W LOCK SCRW3MM 2.2MM

## (undated) DEVICE — PK ORTHO MINOR TOWER 40

## (undated) DEVICE — PAD CAST SOF ROL NS 4IN

## (undated) DEVICE — Device

## (undated) DEVICE — STERILE CAST PADDING KIT: Brand: CARDINAL HEALTH

## (undated) DEVICE — ELECTRD NDL EZ CLN MOD 2.75IN

## (undated) DEVICE — SHOE P/OP/CAST O/T M LG 11/13

## (undated) DEVICE — BIT DRL PROFILE COMPRESSION/FT MINI

## (undated) DEVICE — GOWN,NON-REINFORCED,SIRUS,SET IN SLV,XXL: Brand: MEDLINE

## (undated) DEVICE — REAMR METATRSL 22MM

## (undated) DEVICE — INTENDED FOR TISSUE SEPARATION, AND OTHER PROCEDURES THAT REQUIRE A SHARP SURGICAL BLADE TO PUNCTURE OR CUT.: Brand: BARD-PARKER ® STAINLESS STEEL BLADES

## (undated) DEVICE — DRP C/ARM 41X74IN

## (undated) DEVICE — BNDG ELAS CO-FLEX SLF ADHR 2IN 5YD LF STRL

## (undated) DEVICE — THIN OFFSET (5.5 X 0.38 X 25.0MM)

## (undated) DEVICE — SUT ETHLN 4/0 PS2 PLSTC 1667G

## (undated) DEVICE — REAMR PHALANGL 22MM

## (undated) DEVICE — SUT VIC 3/0 SH 27IN J416H

## (undated) DEVICE — OCCLUSIVE GAUZE STRIP,3% BISMUTH TRIBROMOPHENATE IN PETROLATUM BLEND: Brand: XEROFORM

## (undated) DEVICE — SKIN PREP TRAY W/CHG: Brand: MEDLINE INDUSTRIES, INC.

## (undated) DEVICE — SUT VIC 4/0 RB1 27IN J214H

## (undated) DEVICE — GLV SURG BIOGEL LTX PF 8

## (undated) DEVICE — PIN FIX TEMP BBTAK

## (undated) DEVICE — PRECISION THIN (9.0 X 0.38 X 25.0MM)

## (undated) DEVICE — SPNG GZ WOVN 4X4IN 12PLY 10/BX STRL

## (undated) DEVICE — WEBRIL* CAST PADDING: Brand: DEROYAL

## (undated) DEVICE — KWIRE SMOTH DBL/TROC .062X4IN
Type: IMPLANTABLE DEVICE | Status: NON-FUNCTIONAL
Removed: 2017-01-24